# Patient Record
Sex: MALE | Race: WHITE | Employment: OTHER | URBAN - METROPOLITAN AREA
[De-identification: names, ages, dates, MRNs, and addresses within clinical notes are randomized per-mention and may not be internally consistent; named-entity substitution may affect disease eponyms.]

---

## 2017-01-04 ENCOUNTER — HOSPITAL ENCOUNTER (OUTPATIENT)
Dept: OTHER | Age: 62
Discharge: HOME | End: 2017-01-04
Attending: PSYCHIATRY & NEUROLOGY | Admitting: PSYCHIATRY & NEUROLOGY

## 2017-01-04 LAB
BASOPHILS ABSOLUTE: 0 K/UL (ref 0–0.2)
BASOPHILS RELATIVE PERCENT: 0.4 %
EOSINOPHILS ABSOLUTE: 0.2 K/UL (ref 0–0.6)
EOSINOPHILS RELATIVE PERCENT: 2.6 %
HCT VFR BLD CALC: 39 % (ref 40.5–52.5)
HEMOGLOBIN: 13 G/DL (ref 13.5–17.5)
LYMPHOCYTES ABSOLUTE: 0.3 K/UL (ref 1–5.1)
LYMPHOCYTES RELATIVE PERCENT: 4.7 %
MCH RBC QN AUTO: 30 PG (ref 26–34)
MCHC RBC AUTO-ENTMCNC: 33.4 G/DL (ref 31–36)
MCV RBC AUTO: 89.8 FL (ref 80–100)
MONOCYTES ABSOLUTE: 0.6 K/UL (ref 0–1.3)
MONOCYTES RELATIVE PERCENT: 10.4 %
NEUTROPHILS ABSOLUTE: 5 K/UL (ref 1.7–7.7)
NEUTROPHILS RELATIVE PERCENT: 81.9 %
PDW BLD-RTO: 13.9 % (ref 12.4–15.4)
PLATELET # BLD: 225 K/UL (ref 135–450)
PMV BLD AUTO: 8.5 FL (ref 5–10.5)
RBC # BLD: 4.34 M/UL (ref 4.2–5.9)
TOTAL CK: 51 U/L (ref 39–308)
WBC # BLD: 6.1 K/UL (ref 4–11)

## 2017-01-06 LAB
ACETYLCHOLINE BINDING ANTIBODY: 0 NMOL/L (ref 0–0.4)
ACETYLCHOLINE BLOCKING AB: 0 % (ref 0–26)

## 2017-01-09 LAB — MISCELLANEOUS LAB TEST ORDER: NORMAL

## 2017-01-31 ENCOUNTER — TELEPHONE (OUTPATIENT)
Dept: FAMILY MEDICINE CLINIC | Age: 62
End: 2017-01-31

## 2017-02-01 DIAGNOSIS — R49.8 WEAKNESS OF VOICE: Primary | ICD-10-CM

## 2017-02-07 ENCOUNTER — TELEPHONE (OUTPATIENT)
Dept: FAMILY MEDICINE CLINIC | Age: 62
End: 2017-02-07

## 2017-02-09 RX ORDER — CARVEDILOL 3.12 MG/1
3.12 TABLET ORAL 2 TIMES DAILY WITH MEALS
Qty: 60 TABLET | Refills: 5 | Status: SHIPPED | OUTPATIENT
Start: 2017-02-09 | End: 2017-06-26 | Stop reason: SDUPTHER

## 2017-02-16 ENCOUNTER — TELEPHONE (OUTPATIENT)
Dept: FAMILY MEDICINE CLINIC | Age: 62
End: 2017-02-16

## 2017-02-27 ENCOUNTER — OFFICE VISIT (OUTPATIENT)
Dept: ENT CLINIC | Age: 62
End: 2017-02-27

## 2017-02-27 VITALS
WEIGHT: 220 LBS | BODY MASS INDEX: 34.53 KG/M2 | HEIGHT: 67 IN | DIASTOLIC BLOOD PRESSURE: 68 MMHG | HEART RATE: 71 BPM | SYSTOLIC BLOOD PRESSURE: 109 MMHG

## 2017-02-27 DIAGNOSIS — R13.14 PHARYNGOESOPHAGEAL DYSPHAGIA: ICD-10-CM

## 2017-02-27 DIAGNOSIS — H90.8 HEARING LOSS, MIXED CONDUCTIVE AND SENSORINEURAL: ICD-10-CM

## 2017-02-27 DIAGNOSIS — T17.308A CHOKING, INITIAL ENCOUNTER: Primary | ICD-10-CM

## 2017-02-27 DIAGNOSIS — H61.23 BILATERAL IMPACTED CERUMEN: ICD-10-CM

## 2017-02-27 DIAGNOSIS — K21.9 LARYNGOPHARYNGEAL REFLUX (LPR): ICD-10-CM

## 2017-02-27 PROCEDURE — 69210 REMOVE IMPACTED EAR WAX UNI: CPT | Performed by: OTOLARYNGOLOGY

## 2017-02-27 PROCEDURE — 31575 DIAGNOSTIC LARYNGOSCOPY: CPT | Performed by: OTOLARYNGOLOGY

## 2017-02-27 RX ORDER — OMEPRAZOLE 40 MG/1
40 CAPSULE, DELAYED RELEASE ORAL DAILY
Qty: 30 CAPSULE | Refills: 3 | Status: SHIPPED | OUTPATIENT
Start: 2017-02-27 | End: 2017-08-03 | Stop reason: ALTCHOICE

## 2017-03-16 ENCOUNTER — TELEPHONE (OUTPATIENT)
Dept: FAMILY MEDICINE CLINIC | Age: 62
End: 2017-03-16

## 2017-03-16 RX ORDER — AZITHROMYCIN 250 MG/1
TABLET, FILM COATED ORAL
Qty: 1 PACKET | Refills: 0 | Status: SHIPPED | OUTPATIENT
Start: 2017-03-16 | End: 2017-03-16 | Stop reason: SDUPTHER

## 2017-03-16 RX ORDER — AZITHROMYCIN 250 MG/1
TABLET, FILM COATED ORAL
Qty: 1 PACKET | Refills: 0 | Status: SHIPPED | OUTPATIENT
Start: 2017-03-16 | End: 2017-03-26

## 2017-03-28 ENCOUNTER — TELEPHONE (OUTPATIENT)
Dept: CARDIOLOGY CLINIC | Age: 62
End: 2017-03-28

## 2017-05-03 ENCOUNTER — TELEPHONE (OUTPATIENT)
Dept: CARDIOLOGY CLINIC | Age: 62
End: 2017-05-03

## 2017-05-26 ENCOUNTER — TELEPHONE (OUTPATIENT)
Dept: CARDIOLOGY CLINIC | Age: 62
End: 2017-05-26

## 2017-06-09 ENCOUNTER — TELEPHONE (OUTPATIENT)
Dept: FAMILY MEDICINE CLINIC | Age: 62
End: 2017-06-09

## 2017-06-09 DIAGNOSIS — M54.40 CHRONIC BILATERAL LOW BACK PAIN WITH SCIATICA, SCIATICA LATERALITY UNSPECIFIED: ICD-10-CM

## 2017-06-09 DIAGNOSIS — M51.16 LUMBAR DISC DISEASE WITH RADICULOPATHY: ICD-10-CM

## 2017-06-09 DIAGNOSIS — G89.29 CHRONIC BILATERAL LOW BACK PAIN WITH SCIATICA, SCIATICA LATERALITY UNSPECIFIED: ICD-10-CM

## 2017-06-09 DIAGNOSIS — F32.4 MAJOR DEPRESSION SINGLE EPISODE, IN PARTIAL REMISSION (HCC): ICD-10-CM

## 2017-06-09 RX ORDER — HYDROCODONE BITARTRATE AND ACETAMINOPHEN 5; 325 MG/1; MG/1
1 TABLET ORAL EVERY 6 HOURS PRN
Qty: 120 TABLET | Refills: 0 | Status: SHIPPED | OUTPATIENT
Start: 2017-06-09 | End: 2017-09-21 | Stop reason: SDUPTHER

## 2017-06-09 RX ORDER — VENLAFAXINE HYDROCHLORIDE 150 MG/1
150 CAPSULE, EXTENDED RELEASE ORAL DAILY
Qty: 90 CAPSULE | Refills: 1 | Status: SHIPPED | OUTPATIENT
Start: 2017-06-09 | End: 2018-01-16 | Stop reason: SDUPTHER

## 2017-06-21 ENCOUNTER — OFFICE VISIT (OUTPATIENT)
Dept: CARDIOLOGY CLINIC | Age: 62
End: 2017-06-21

## 2017-06-21 VITALS
BODY MASS INDEX: 34.42 KG/M2 | HEART RATE: 71 BPM | SYSTOLIC BLOOD PRESSURE: 110 MMHG | OXYGEN SATURATION: 98 % | WEIGHT: 214.2 LBS | DIASTOLIC BLOOD PRESSURE: 70 MMHG | HEIGHT: 66 IN

## 2017-06-21 DIAGNOSIS — I25.10 CORONARY ARTERY DISEASE INVOLVING NATIVE CORONARY ARTERY OF NATIVE HEART WITHOUT ANGINA PECTORIS: Primary | ICD-10-CM

## 2017-06-21 DIAGNOSIS — Z95.810 ICD (IMPLANTABLE CARDIOVERTER-DEFIBRILLATOR), BIVENTRICULAR, IN SITU: ICD-10-CM

## 2017-06-21 DIAGNOSIS — I50.22 CHRONIC SYSTOLIC CONGESTIVE HEART FAILURE (HCC): ICD-10-CM

## 2017-06-21 DIAGNOSIS — I44.7 LEFT BUNDLE BRANCH BLOCK: ICD-10-CM

## 2017-06-21 DIAGNOSIS — I50.20 SYSTOLIC CONGESTIVE HEART FAILURE, NYHA CLASS 3 (HCC): ICD-10-CM

## 2017-06-21 PROCEDURE — 93000 ELECTROCARDIOGRAM COMPLETE: CPT | Performed by: INTERNAL MEDICINE

## 2017-06-21 PROCEDURE — 99214 OFFICE O/P EST MOD 30 MIN: CPT | Performed by: INTERNAL MEDICINE

## 2017-06-22 ENCOUNTER — TELEPHONE (OUTPATIENT)
Dept: CARDIOLOGY CLINIC | Age: 62
End: 2017-06-22

## 2017-06-22 DIAGNOSIS — I51.9 LV DYSFUNCTION: ICD-10-CM

## 2017-06-22 DIAGNOSIS — I50.30 CHF WITH LEFT VENTRICULAR DIASTOLIC DYSFUNCTION, NYHA CLASS 2 (HCC): Primary | ICD-10-CM

## 2017-06-26 RX ORDER — LISINOPRIL 2.5 MG/1
TABLET ORAL
Qty: 90 TABLET | Refills: 0 | Status: SHIPPED | OUTPATIENT
Start: 2017-06-26 | End: 2017-07-06 | Stop reason: ALTCHOICE

## 2017-06-27 ENCOUNTER — TELEPHONE (OUTPATIENT)
Dept: FAMILY MEDICINE CLINIC | Age: 62
End: 2017-06-27

## 2017-06-27 RX ORDER — CARVEDILOL 3.12 MG/1
TABLET ORAL
Qty: 180 TABLET | Refills: 3 | Status: SHIPPED | OUTPATIENT
Start: 2017-06-27 | End: 2018-11-29 | Stop reason: SDUPTHER

## 2017-06-30 ENCOUNTER — HOSPITAL ENCOUNTER (OUTPATIENT)
Dept: NON INVASIVE DIAGNOSTICS | Age: 62
Discharge: HOME | End: 2017-06-30
Attending: INTERNAL MEDICINE | Admitting: INTERNAL MEDICINE

## 2017-06-30 DIAGNOSIS — I50.30 DIASTOLIC CONGESTIVE HEART FAILURE (HCC): ICD-10-CM

## 2017-06-30 LAB
LV EF: 23 %
LVEF MODALITY: NORMAL

## 2017-07-06 ENCOUNTER — TELEPHONE (OUTPATIENT)
Dept: CARDIOLOGY CLINIC | Age: 62
End: 2017-07-06

## 2017-07-13 ENCOUNTER — TELEPHONE (OUTPATIENT)
Dept: FAMILY MEDICINE CLINIC | Age: 62
End: 2017-07-13

## 2017-07-18 ENCOUNTER — NURSE ONLY (OUTPATIENT)
Dept: CARDIOLOGY CLINIC | Age: 62
End: 2017-07-18

## 2017-07-18 DIAGNOSIS — Z95.810 ICD (IMPLANTABLE CARDIOVERTER-DEFIBRILLATOR), BIVENTRICULAR, IN SITU: Primary | ICD-10-CM

## 2017-07-18 DIAGNOSIS — I42.9 CARDIOMYOPATHY (HCC): ICD-10-CM

## 2017-07-18 DIAGNOSIS — I50.30 CHF WITH LEFT VENTRICULAR DIASTOLIC DYSFUNCTION, NYHA CLASS 2 (HCC): ICD-10-CM

## 2017-07-18 PROCEDURE — 93296 REM INTERROG EVL PM/IDS: CPT | Performed by: INTERNAL MEDICINE

## 2017-07-18 PROCEDURE — 93297 REM INTERROG DEV EVAL ICPMS: CPT | Performed by: INTERNAL MEDICINE

## 2017-07-18 PROCEDURE — 93295 DEV INTERROG REMOTE 1/2/MLT: CPT | Performed by: INTERNAL MEDICINE

## 2017-07-19 ENCOUNTER — TELEPHONE (OUTPATIENT)
Dept: CARDIOLOGY CLINIC | Age: 62
End: 2017-07-19

## 2017-08-03 ENCOUNTER — TELEPHONE (OUTPATIENT)
Dept: CARDIOLOGY CLINIC | Age: 62
End: 2017-08-03

## 2017-08-03 ENCOUNTER — HOSPITAL ENCOUNTER (OUTPATIENT)
Dept: CARDIAC REHAB | Age: 62
Discharge: HOME | End: 2017-08-31
Attending: INTERNAL MEDICINE | Admitting: INTERNAL MEDICINE

## 2017-08-07 ENCOUNTER — OFFICE VISIT (OUTPATIENT)
Dept: FAMILY MEDICINE CLINIC | Age: 62
End: 2017-08-07

## 2017-08-07 VITALS
HEIGHT: 67 IN | BODY MASS INDEX: 34.37 KG/M2 | DIASTOLIC BLOOD PRESSURE: 80 MMHG | SYSTOLIC BLOOD PRESSURE: 124 MMHG | WEIGHT: 219 LBS

## 2017-08-07 DIAGNOSIS — F32.4 MAJOR DEPRESSION SINGLE EPISODE, IN PARTIAL REMISSION (HCC): ICD-10-CM

## 2017-08-07 DIAGNOSIS — Z00.00 PREVENTATIVE HEALTH CARE: ICD-10-CM

## 2017-08-07 DIAGNOSIS — G89.29 CHRONIC MIDLINE LOW BACK PAIN WITHOUT SCIATICA: ICD-10-CM

## 2017-08-07 DIAGNOSIS — Z11.59 ENCOUNTER FOR HEPATITIS C SCREENING TEST FOR LOW RISK PATIENT: ICD-10-CM

## 2017-08-07 DIAGNOSIS — G35 MS (MULTIPLE SCLEROSIS) (HCC): ICD-10-CM

## 2017-08-07 DIAGNOSIS — M75.101 ROTATOR CUFF SYNDROME, RIGHT: ICD-10-CM

## 2017-08-07 DIAGNOSIS — E78.00 HYPERCHOLESTEROLEMIA: ICD-10-CM

## 2017-08-07 DIAGNOSIS — M54.50 CHRONIC MIDLINE LOW BACK PAIN WITHOUT SCIATICA: ICD-10-CM

## 2017-08-07 DIAGNOSIS — I10 ESSENTIAL HYPERTENSION: Primary | ICD-10-CM

## 2017-08-07 DIAGNOSIS — I50.30 CHF WITH LEFT VENTRICULAR DIASTOLIC DYSFUNCTION, NYHA CLASS 2 (HCC): ICD-10-CM

## 2017-08-07 LAB
ALT SERPL-CCNC: 14 U/L (ref 10–40)
ANION GAP SERPL CALCULATED.3IONS-SCNC: 17 MMOL/L (ref 3–16)
AST SERPL-CCNC: 13 U/L (ref 15–37)
BUN BLDV-MCNC: 17 MG/DL (ref 7–20)
CALCIUM SERPL-MCNC: 9.1 MG/DL (ref 8.3–10.6)
CHLORIDE BLD-SCNC: 102 MMOL/L (ref 99–110)
CHOLESTEROL, TOTAL: 108 MG/DL (ref 0–199)
CO2: 21 MMOL/L (ref 21–32)
CREAT SERPL-MCNC: 1.1 MG/DL (ref 0.8–1.3)
GFR AFRICAN AMERICAN: >60
GFR NON-AFRICAN AMERICAN: >60
GLUCOSE BLD-MCNC: 93 MG/DL (ref 70–99)
HDLC SERPL-MCNC: 35 MG/DL (ref 40–60)
LDL CHOLESTEROL CALCULATED: 50 MG/DL
POTASSIUM SERPL-SCNC: 4.3 MMOL/L (ref 3.5–5.1)
SODIUM BLD-SCNC: 140 MMOL/L (ref 136–145)
TRIGL SERPL-MCNC: 113 MG/DL (ref 0–150)
VLDLC SERPL CALC-MCNC: 23 MG/DL

## 2017-08-07 PROCEDURE — 99214 OFFICE O/P EST MOD 30 MIN: CPT | Performed by: FAMILY MEDICINE

## 2017-08-07 PROCEDURE — 36415 COLL VENOUS BLD VENIPUNCTURE: CPT | Performed by: FAMILY MEDICINE

## 2017-08-07 ASSESSMENT — ENCOUNTER SYMPTOMS: SHORTNESS OF BREATH: 0

## 2017-08-08 LAB — HEPATITIS C ANTIBODY INTERPRETATION: NORMAL

## 2017-08-09 ENCOUNTER — TELEPHONE (OUTPATIENT)
Dept: CARDIOLOGY CLINIC | Age: 62
End: 2017-08-09

## 2017-08-09 ENCOUNTER — HOSPITAL ENCOUNTER (OUTPATIENT)
Dept: MRI IMAGING | Age: 62
Discharge: HOME | End: 2017-08-09
Attending: PSYCHIATRY & NEUROLOGY | Admitting: PSYCHIATRY & NEUROLOGY

## 2017-08-09 DIAGNOSIS — G35 MULTIPLE SCLEROSIS (HCC): ICD-10-CM

## 2017-08-11 ENCOUNTER — TELEPHONE (OUTPATIENT)
Dept: CARDIOLOGY CLINIC | Age: 62
End: 2017-08-11

## 2017-09-06 ENCOUNTER — HOSPITAL ENCOUNTER (OUTPATIENT)
Dept: OTHER | Age: 62
Discharge: HOME | End: 2017-09-30
Attending: FAMILY MEDICINE | Admitting: FAMILY MEDICINE

## 2017-09-06 NOTE — FLOWSHEET NOTE
Physical Therapy Daily Treatment Note  Date:  2017    Patient Name:  Domenic Blake    :  1955  MRN: [id number]  Restrictions/Precautions:  Pacemaker, Multiple Sclerosis  Pertinent Medical History: CHF, CAD- Stent Placement , Back Pain, HTN, Stroke, Cardiomyopathy, Lumbar Disc Disease-Laminectomy , Depression  Medical/Treatment Diagnosis Information:  Diagnosis: Rotator Cuff Syndrome, Right  Treatment Diagnosis: Decreased R shoulder PROM, joint play, and strength seconday to RTC chronic tear. Insurance/Certification information:  PT Insurance Information: [address] [place] [address]  Physician Information:  Referring Practitioner: Dr. Jazmin Cantrell of care signed (Y/N):    Visit# / total visits:    Pain level: /10     G-Code (if applicable):      Date / Visit # G-Code Applied:  17  PT G-Codes  Functional Assessment Tool Used: Charisma Waters  Score: 15  Functional Limitation: Carrying, moving and handling objects  Carrying, Moving and Handling Objects Current Status (): At least 20 percent but less than 40 percent impaired, limited or restricted  Carrying, Moving and Handling Objects Goal Status (): At least 1 percent but less than 20 percent impaired, limited or restricted    Progress Note: []  Yes  []  No  Next due by: Visit #10      History of Injury:  Pt states in  he tore his R RTC. They attempted surgery in  but there was no tissue to repair it. He has been living with it since. Pt states in May, 2017 he noticed it started getting weak/tired, he can't raise it high. Pain is 6-8/10 sometimes,comes and goes. Pt is disabled and does not work. Has trouble getting dressed with shirts and jackets. No trouble sleeping. Plays golf. Goal is to have less pain and more movement.      Subjective:       Objective:  Observation:   Test measurements:      Exercises:  Exercise/Equipment Resistance/Repetitions Other comments                                           HEP      Table

## 2017-09-06 NOTE — PROGRESS NOTES
Quick DASH      Patient: Grecia Joy  : 1955  MRN: [id number]  Date: 2017  Electronically Signed by: Raeann Patel    Instructions:   · This Questionnaire asks about your symptoms as well as your ability to perform certain activities. · Please answer every question, based on your condition in the last week, by selecting the appropriate number. · If you did not have the opportunity to perform the activity in the past week, please make your best estimate of which response would be the most accurate. · It doesn't matter which hand or arm you use to perform the activity; please answer based on your ability regardless of how you perform the task. Please rate your ability to do the following activities in the last week by selecting the number below the appropriate response      No Difficulty Mild Difficulty Moderate Difficulty Severe Difficulty Unable   1. Open a tight or new jar    [] 1  [x] 2  [] 3  [] 4  [] 5   2. Do heavy household chores (e.g., wash walls, floors)  [] 1  [] 2  [] 3  [] 4  [] 5   3. Mague a shopping bag or briefcase  [] 1  [x] 2  [] 3  [] 4  [] 5   4. Wash your back    [] 1  [x] 2  [] 3  [] 4  [] 5   5. Use a knife to cut food    [x] 1  [] 2  [] 3  [] 4  [] 5   6. Recreational activities in which you take some force or impact through your arm, shoulder, or hand (e.g., golf, hammering, tennis, etc.)  [] 1  [x] 2   3  [] 4  [] 5      Not At All  Slightly Moderately Quite A Bit  Extremely   7. During the past week, to what extent has your arm, shoulder or hand problem interfered with your normal social activities with family, friends, neighbors or groups? [x] 1  [] 2  [] 3  [] 4  [] 5      Not Limited At All Slightly Limited  Moderately Limited Very Limited  Unable   8. During the past week, were you limited in your work or other regular daily activities as a result of your arm, shoulder or hand problem?   [x] 1  [] 2  [] 3  [] 4  [] 5     Please rate the severity of the

## 2017-09-08 ENCOUNTER — HOSPITAL ENCOUNTER (OUTPATIENT)
Dept: PHYSICAL THERAPY | Age: 62
Discharge: HOME | End: 2017-09-09
Admitting: FAMILY MEDICINE

## 2017-09-11 ENCOUNTER — TELEPHONE (OUTPATIENT)
Dept: CARDIOLOGY CLINIC | Age: 62
End: 2017-09-11

## 2017-09-11 RX ORDER — ATORVASTATIN CALCIUM 20 MG/1
TABLET, FILM COATED ORAL
Qty: 14 TABLET | Refills: 0 | Status: SHIPPED | OUTPATIENT
Start: 2017-09-11 | End: 2017-09-14 | Stop reason: SDUPTHER

## 2017-09-13 ENCOUNTER — HOSPITAL ENCOUNTER (OUTPATIENT)
Dept: MRI IMAGING | Age: 62
Discharge: HOME | End: 2017-09-13
Attending: PSYCHIATRY & NEUROLOGY | Admitting: PSYCHIATRY & NEUROLOGY

## 2017-09-13 DIAGNOSIS — Z51.81 ENCOUNTER FOR THERAPEUTIC DRUG MONITORING: ICD-10-CM

## 2017-09-13 DIAGNOSIS — G35 MULTIPLE SCLEROSIS (HCC): ICD-10-CM

## 2017-09-13 DIAGNOSIS — Z95.0 PACEMAKER: ICD-10-CM

## 2017-09-14 RX ORDER — ATORVASTATIN CALCIUM 20 MG/1
TABLET, FILM COATED ORAL
Qty: 90 TABLET | Refills: 3 | Status: SHIPPED | OUTPATIENT
Start: 2017-09-14 | End: 2018-10-11 | Stop reason: SDUPTHER

## 2017-09-18 ENCOUNTER — HOSPITAL ENCOUNTER (OUTPATIENT)
Dept: MRI IMAGING | Age: 62
Discharge: HOME | End: 2017-09-18
Attending: PSYCHIATRY & NEUROLOGY | Admitting: PSYCHIATRY & NEUROLOGY

## 2017-09-18 ENCOUNTER — HOSPITAL ENCOUNTER (OUTPATIENT)
Dept: PHYSICAL THERAPY | Age: 62
Discharge: HOME | End: 2017-09-19
Admitting: FAMILY MEDICINE

## 2017-09-18 DIAGNOSIS — G35 MULTIPLE SCLEROSIS (HCC): ICD-10-CM

## 2017-09-18 DIAGNOSIS — Z51.81 ENCOUNTER FOR THERAPEUTIC DRUG MONITORING: ICD-10-CM

## 2017-09-21 ENCOUNTER — TELEPHONE (OUTPATIENT)
Dept: FAMILY MEDICINE CLINIC | Age: 62
End: 2017-09-21

## 2017-09-21 DIAGNOSIS — M51.16 LUMBAR DISC DISEASE WITH RADICULOPATHY: ICD-10-CM

## 2017-09-21 DIAGNOSIS — G89.29 CHRONIC BILATERAL LOW BACK PAIN WITH SCIATICA, SCIATICA LATERALITY UNSPECIFIED: ICD-10-CM

## 2017-09-21 DIAGNOSIS — M54.40 CHRONIC BILATERAL LOW BACK PAIN WITH SCIATICA, SCIATICA LATERALITY UNSPECIFIED: ICD-10-CM

## 2017-09-21 RX ORDER — HYDROCODONE BITARTRATE AND ACETAMINOPHEN 5; 325 MG/1; MG/1
1 TABLET ORAL EVERY 6 HOURS PRN
Qty: 120 TABLET | Refills: 0 | Status: SHIPPED | OUTPATIENT
Start: 2017-09-21 | End: 2018-02-06 | Stop reason: SDUPTHER

## 2017-09-22 ENCOUNTER — HOSPITAL ENCOUNTER (OUTPATIENT)
Dept: PHYSICAL THERAPY | Age: 62
Discharge: HOME | End: 2017-09-23
Admitting: FAMILY MEDICINE

## 2017-09-27 ENCOUNTER — HOSPITAL ENCOUNTER (OUTPATIENT)
Dept: PHYSICAL THERAPY | Age: 62
Discharge: HOME | End: 2017-09-28
Admitting: FAMILY MEDICINE

## 2017-09-29 ENCOUNTER — HOSPITAL ENCOUNTER (OUTPATIENT)
Dept: PHYSICAL THERAPY | Age: 62
Discharge: HOME | End: 2017-09-30
Admitting: FAMILY MEDICINE

## 2017-10-01 ENCOUNTER — HOSPITAL ENCOUNTER (OUTPATIENT)
Dept: OTHER | Age: 62
Discharge: HOME | End: 2017-10-31
Attending: INTERNAL MEDICINE | Admitting: INTERNAL MEDICINE

## 2017-10-02 ENCOUNTER — HOSPITAL ENCOUNTER (OUTPATIENT)
Dept: PHYSICAL THERAPY | Age: 62
Discharge: HOME | End: 2017-10-03
Admitting: FAMILY MEDICINE

## 2017-10-04 ENCOUNTER — HOSPITAL ENCOUNTER (OUTPATIENT)
Dept: PHYSICAL THERAPY | Age: 62
Discharge: HOME | End: 2017-10-05
Admitting: FAMILY MEDICINE

## 2017-10-09 ENCOUNTER — HOSPITAL ENCOUNTER (OUTPATIENT)
Dept: PHYSICAL THERAPY | Age: 62
Discharge: HOME | End: 2017-10-10
Admitting: FAMILY MEDICINE

## 2017-10-11 ENCOUNTER — HOSPITAL ENCOUNTER (OUTPATIENT)
Dept: PHYSICAL THERAPY | Age: 62
Discharge: HOME | End: 2017-10-12
Admitting: FAMILY MEDICINE

## 2017-10-16 ENCOUNTER — HOSPITAL ENCOUNTER (OUTPATIENT)
Dept: PHYSICAL THERAPY | Age: 62
Discharge: HOME | End: 2017-10-17
Admitting: FAMILY MEDICINE

## 2017-10-17 ENCOUNTER — NURSE ONLY (OUTPATIENT)
Dept: CARDIOLOGY CLINIC | Age: 62
End: 2017-10-17

## 2017-10-17 DIAGNOSIS — Z95.810 ICD (IMPLANTABLE CARDIOVERTER-DEFIBRILLATOR), BIVENTRICULAR, IN SITU: Primary | ICD-10-CM

## 2017-10-17 DIAGNOSIS — I42.9 CARDIOMYOPATHY, UNSPECIFIED TYPE (HCC): ICD-10-CM

## 2017-10-17 DIAGNOSIS — I50.30 CHF WITH LEFT VENTRICULAR DIASTOLIC DYSFUNCTION, NYHA CLASS 2 (HCC): ICD-10-CM

## 2017-10-17 DIAGNOSIS — I50.22 HEART FAILURE, CHRONIC SYSTOLIC (HCC): ICD-10-CM

## 2017-10-17 PROCEDURE — 93297 REM INTERROG DEV EVAL ICPMS: CPT | Performed by: INTERNAL MEDICINE

## 2017-10-17 PROCEDURE — 93296 REM INTERROG EVL PM/IDS: CPT | Performed by: INTERNAL MEDICINE

## 2017-10-17 PROCEDURE — 93295 DEV INTERROG REMOTE 1/2/MLT: CPT | Performed by: INTERNAL MEDICINE

## 2017-10-18 ENCOUNTER — HOSPITAL ENCOUNTER (OUTPATIENT)
Dept: PHYSICAL THERAPY | Age: 62
Discharge: HOME | End: 2017-10-19
Admitting: FAMILY MEDICINE

## 2017-10-20 NOTE — PROGRESS NOTES
See PACEART report under Cardiology tab. Carelink transmission shows normal functioning device. No new arrhythmias recorded. Thoracic impedance trend stable. Follow up in 3 months via carelink.

## 2017-11-01 ENCOUNTER — HOSPITAL ENCOUNTER (OUTPATIENT)
Dept: OTHER | Age: 62
Discharge: HOME | End: 2017-11-30
Attending: INTERNAL MEDICINE | Admitting: INTERNAL MEDICINE

## 2017-11-01 ENCOUNTER — HOSPITAL ENCOUNTER (OUTPATIENT)
Dept: OTHER | Age: 62
Discharge: HOME | End: 2017-11-30
Attending: FAMILY MEDICINE | Admitting: FAMILY MEDICINE

## 2017-12-01 ENCOUNTER — HOSPITAL ENCOUNTER (OUTPATIENT)
Dept: OTHER | Age: 62
Discharge: HOME | End: 2017-12-31
Attending: INTERNAL MEDICINE | Admitting: INTERNAL MEDICINE

## 2017-12-18 ENCOUNTER — OFFICE VISIT (OUTPATIENT)
Dept: CARDIOLOGY CLINIC | Age: 62
End: 2017-12-18

## 2017-12-18 VITALS
SYSTOLIC BLOOD PRESSURE: 126 MMHG | HEART RATE: 88 BPM | HEIGHT: 67 IN | WEIGHT: 220.38 LBS | OXYGEN SATURATION: 98 % | DIASTOLIC BLOOD PRESSURE: 62 MMHG | BODY MASS INDEX: 34.59 KG/M2

## 2017-12-18 DIAGNOSIS — I50.22 CHRONIC SYSTOLIC CHF (CONGESTIVE HEART FAILURE), NYHA CLASS 3 (HCC): ICD-10-CM

## 2017-12-18 DIAGNOSIS — I44.7 LBBB (LEFT BUNDLE BRANCH BLOCK): ICD-10-CM

## 2017-12-18 DIAGNOSIS — E78.5 HYPERLIPIDEMIA LDL GOAL <70: ICD-10-CM

## 2017-12-18 DIAGNOSIS — I25.10 CORONARY ARTERY DISEASE INVOLVING NATIVE CORONARY ARTERY OF NATIVE HEART WITHOUT ANGINA PECTORIS: Primary | ICD-10-CM

## 2017-12-18 PROCEDURE — G8417 CALC BMI ABV UP PARAM F/U: HCPCS | Performed by: INTERNAL MEDICINE

## 2017-12-18 PROCEDURE — G8427 DOCREV CUR MEDS BY ELIG CLIN: HCPCS | Performed by: INTERNAL MEDICINE

## 2017-12-18 PROCEDURE — 3017F COLORECTAL CA SCREEN DOC REV: CPT | Performed by: INTERNAL MEDICINE

## 2017-12-18 PROCEDURE — 99214 OFFICE O/P EST MOD 30 MIN: CPT | Performed by: INTERNAL MEDICINE

## 2017-12-18 PROCEDURE — G8484 FLU IMMUNIZE NO ADMIN: HCPCS | Performed by: INTERNAL MEDICINE

## 2017-12-18 PROCEDURE — 1036F TOBACCO NON-USER: CPT | Performed by: INTERNAL MEDICINE

## 2017-12-18 PROCEDURE — G8598 ASA/ANTIPLAT THER USED: HCPCS | Performed by: INTERNAL MEDICINE

## 2017-12-18 NOTE — PROGRESS NOTES
severely reduced. Ejection   fraction is visually estimated to be 25-30 %. There is severe diffuse   hypokinesis with regional variation including akinesis of the inferior wall.   Mild-to-moderately dilated left ventricle. Diastolic filling parameters   suggests grade II diastolic dysfunction.   Normal right ventricular size and function.   The left atrium appears mildly dilated.   Trivial mitral regurgitation. Echo 6/30/17: Moderately dilated LV with EF 20-25%; Distal septal wall thinning with   akinesis. Postero lateral and inferior akinesis as well.   Mild mitral regurgitation is present.  Mily Keys dilated left atrium.   Trace aortic regurgitation is present.   The RV is mildly enlarged with mildly reduced EF. Pacer / ICD wire is   visualized in it.          ECG 06/2015: Sinus bradycardia with LBBB  ECG 8/24/16: Sinus bradycardia with frequent PVC's, LBBB      Assessment:  1. CAD of native coronary arteries without angina, s/p PCI  2. Chronic Systolic CHF, class 3, s/p BiV AICD  3. Hyperlipidemia with goal LDL<70mg/dL  4. Left Bundle Branch Block    Plan:  As he is currently tolerating and his blood pressure allows, I will increase his dose of Entresto to the full tablet of 24-26 twice daily. I have encouraged him to increase his weekly exercise as this will help aid in his fatigue. I have also encouraged him to monitor daily weights and call the office with weight gain or an increase in swelling. I will see him back in office for follow up in 6 months.

## 2018-01-04 ENCOUNTER — TELEPHONE (OUTPATIENT)
Dept: CARDIOLOGY CLINIC | Age: 63
End: 2018-01-04

## 2018-01-16 ENCOUNTER — NURSE ONLY (OUTPATIENT)
Dept: CARDIOLOGY CLINIC | Age: 63
End: 2018-01-16

## 2018-01-16 ENCOUNTER — TELEPHONE (OUTPATIENT)
Dept: CARDIOLOGY CLINIC | Age: 63
End: 2018-01-16

## 2018-01-16 DIAGNOSIS — Z95.810 ICD (IMPLANTABLE CARDIOVERTER-DEFIBRILLATOR), BIVENTRICULAR, IN SITU: ICD-10-CM

## 2018-01-16 DIAGNOSIS — I25.5 ISCHEMIC CARDIOMYOPATHY: Chronic | ICD-10-CM

## 2018-01-16 DIAGNOSIS — I50.22 HEART FAILURE, CHRONIC SYSTOLIC (HCC): ICD-10-CM

## 2018-01-16 PROCEDURE — 93296 REM INTERROG EVL PM/IDS: CPT | Performed by: INTERNAL MEDICINE

## 2018-01-16 PROCEDURE — 93297 REM INTERROG DEV EVAL ICPMS: CPT | Performed by: INTERNAL MEDICINE

## 2018-01-16 PROCEDURE — 93295 DEV INTERROG REMOTE 1/2/MLT: CPT | Performed by: INTERNAL MEDICINE

## 2018-01-16 NOTE — PROGRESS NOTES
Carelink transmission shows normal functioning device. No new episodes recorded. TI is elevated.   (Message sent to Dr. Alexandra Guillen staff to address.)

## 2018-01-16 NOTE — TELEPHONE ENCOUNTER
Pt had a remote transmission today which showed that his Optivol is elevated indicating a possibility of fluid retention. Please call pt to discuss. Thank you.

## 2018-01-17 NOTE — TELEPHONE ENCOUNTER
Spoke with wife and she states that Scotty Hartman is doing really well. No SOB, No weight gain, no edema. She is very pleased with how well  he is doing.

## 2018-01-18 ENCOUNTER — TELEPHONE (OUTPATIENT)
Dept: CARDIOLOGY CLINIC | Age: 63
End: 2018-01-18

## 2018-01-18 NOTE — TELEPHONE ENCOUNTER
Went over med list with Marques Kessler and informed her that we do not have Lisinopril on his list.  She thought that was the one he was not supposed to take. I will close note for now.

## 2018-01-18 NOTE — TELEPHONE ENCOUNTER
Pérez Herman     Please call the patient to let the know Dr. Kelly Briscoe is aware of how he is feeling and does not need to make any changes at this time.

## 2018-02-06 ENCOUNTER — OFFICE VISIT (OUTPATIENT)
Dept: FAMILY MEDICINE CLINIC | Age: 63
End: 2018-02-06

## 2018-02-06 VITALS
WEIGHT: 228 LBS | BODY MASS INDEX: 35.79 KG/M2 | DIASTOLIC BLOOD PRESSURE: 72 MMHG | SYSTOLIC BLOOD PRESSURE: 120 MMHG | HEIGHT: 67 IN

## 2018-02-06 DIAGNOSIS — M54.50 CHRONIC MIDLINE LOW BACK PAIN WITHOUT SCIATICA: ICD-10-CM

## 2018-02-06 DIAGNOSIS — M25.511 CHRONIC RIGHT SHOULDER PAIN: ICD-10-CM

## 2018-02-06 DIAGNOSIS — M54.40 CHRONIC BILATERAL LOW BACK PAIN WITH SCIATICA, SCIATICA LATERALITY UNSPECIFIED: ICD-10-CM

## 2018-02-06 DIAGNOSIS — E78.00 HYPERCHOLESTEROLEMIA: ICD-10-CM

## 2018-02-06 DIAGNOSIS — G89.29 CHRONIC RIGHT SHOULDER PAIN: ICD-10-CM

## 2018-02-06 DIAGNOSIS — I50.30 CHF WITH LEFT VENTRICULAR DIASTOLIC DYSFUNCTION, NYHA CLASS 2 (HCC): ICD-10-CM

## 2018-02-06 DIAGNOSIS — M51.16 LUMBAR DISC DISEASE WITH RADICULOPATHY: ICD-10-CM

## 2018-02-06 DIAGNOSIS — I10 ESSENTIAL HYPERTENSION: Primary | ICD-10-CM

## 2018-02-06 DIAGNOSIS — F32.4 MAJOR DEPRESSION SINGLE EPISODE, IN PARTIAL REMISSION (HCC): ICD-10-CM

## 2018-02-06 DIAGNOSIS — G89.29 CHRONIC MIDLINE LOW BACK PAIN WITHOUT SCIATICA: ICD-10-CM

## 2018-02-06 DIAGNOSIS — G35 MS (MULTIPLE SCLEROSIS) (HCC): ICD-10-CM

## 2018-02-06 DIAGNOSIS — G89.29 CHRONIC BILATERAL LOW BACK PAIN WITH SCIATICA, SCIATICA LATERALITY UNSPECIFIED: ICD-10-CM

## 2018-02-06 PROCEDURE — 1036F TOBACCO NON-USER: CPT | Performed by: FAMILY MEDICINE

## 2018-02-06 PROCEDURE — 3017F COLORECTAL CA SCREEN DOC REV: CPT | Performed by: FAMILY MEDICINE

## 2018-02-06 PROCEDURE — G8484 FLU IMMUNIZE NO ADMIN: HCPCS | Performed by: FAMILY MEDICINE

## 2018-02-06 PROCEDURE — G8417 CALC BMI ABV UP PARAM F/U: HCPCS | Performed by: FAMILY MEDICINE

## 2018-02-06 PROCEDURE — 99213 OFFICE O/P EST LOW 20 MIN: CPT | Performed by: FAMILY MEDICINE

## 2018-02-06 PROCEDURE — G8427 DOCREV CUR MEDS BY ELIG CLIN: HCPCS | Performed by: FAMILY MEDICINE

## 2018-02-06 PROCEDURE — G8598 ASA/ANTIPLAT THER USED: HCPCS | Performed by: FAMILY MEDICINE

## 2018-02-06 RX ORDER — HYDROCODONE BITARTRATE AND ACETAMINOPHEN 5; 325 MG/1; MG/1
1 TABLET ORAL EVERY 6 HOURS PRN
Qty: 360 TABLET | Refills: 0 | Status: SHIPPED | OUTPATIENT
Start: 2018-02-06 | End: 2018-02-09 | Stop reason: SDUPTHER

## 2018-02-06 RX ORDER — DICLOFENAC SODIUM 75 MG/1
75 TABLET, DELAYED RELEASE ORAL 2 TIMES DAILY
Qty: 60 TABLET | Refills: 0 | Status: SHIPPED | OUTPATIENT
Start: 2018-02-06 | End: 2018-02-26 | Stop reason: SDUPTHER

## 2018-02-06 ASSESSMENT — ENCOUNTER SYMPTOMS: SHORTNESS OF BREATH: 0

## 2018-02-06 NOTE — PROGRESS NOTES
kg/m²    Objective:   Physical Exam   Constitutional: He is oriented to person, place, and time. He appears well-developed and well-nourished. No distress. HENT:   Head: Normocephalic. Right Ear: External ear normal.   Left Ear: External ear normal.   Mouth/Throat: Oropharynx is clear and moist. No oropharyngeal exudate. Neck: No JVD present. No thyromegaly present. Cardiovascular: Normal rate, regular rhythm, normal heart sounds and intact distal pulses. No murmur heard. Pulmonary/Chest: Effort normal and breath sounds normal. He has no wheezes. He has no rales. Musculoskeletal: He exhibits no edema. Patient is unable to abduct the right arm to 90 degrees and has trouble flexing past 90 degrees. No pain to palpation and no crepitus. Lymphadenopathy:     He has no cervical adenopathy. Neurological: He is alert and oriented to person, place, and time. Assessment:      Hypertension  Hyperlipidemia  Depression  Chronic Low Back Pain  Congestive Heart Failure  Multiple Sclerosis  Right Shoulder Pain      Plan:      OARRS report was reviewed  Medications refilled  Diclofenac 75 mg BID for 30 days. Physical Therapy for Right Shoulder  RTO 6 months for Hypertension / Depression      Controlled Substances Monitoring:     Attestation: The Prescription Monitoring Report for this patient was reviewed today. LEXIS Guidry  Documentation: No signs of potential drug abuse or diversion identified. LEXIS Guidry  Chronic Pain: Functional status reviewed - continues with improved or maintaining ADL's. LEXIS Guidry  Medication Contracts: Existing medication contract.  Mario Guido DO)

## 2018-02-09 ENCOUNTER — TELEPHONE (OUTPATIENT)
Dept: FAMILY MEDICINE CLINIC | Age: 63
End: 2018-02-09

## 2018-02-09 DIAGNOSIS — G89.29 CHRONIC BILATERAL LOW BACK PAIN WITH SCIATICA, SCIATICA LATERALITY UNSPECIFIED: ICD-10-CM

## 2018-02-09 DIAGNOSIS — M51.16 LUMBAR DISC DISEASE WITH RADICULOPATHY: ICD-10-CM

## 2018-02-09 DIAGNOSIS — M54.40 CHRONIC BILATERAL LOW BACK PAIN WITH SCIATICA, SCIATICA LATERALITY UNSPECIFIED: ICD-10-CM

## 2018-02-09 RX ORDER — HYDROCODONE BITARTRATE AND ACETAMINOPHEN 5; 325 MG/1; MG/1
1 TABLET ORAL EVERY 6 HOURS PRN
Qty: 120 TABLET | Refills: 0 | Status: SHIPPED | OUTPATIENT
Start: 2018-02-09 | End: 2018-07-24 | Stop reason: SDUPTHER

## 2018-02-09 NOTE — TELEPHONE ENCOUNTER
[address] (Mail [address])  States they can only fill 30 days at a time per new guidelines  And will need a new script  Please advise

## 2018-02-26 ENCOUNTER — TELEPHONE (OUTPATIENT)
Dept: FAMILY MEDICINE CLINIC | Age: 63
End: 2018-02-26

## 2018-02-26 DIAGNOSIS — M25.511 CHRONIC RIGHT SHOULDER PAIN: ICD-10-CM

## 2018-02-26 DIAGNOSIS — G89.29 CHRONIC RIGHT SHOULDER PAIN: ICD-10-CM

## 2018-02-26 RX ORDER — DICLOFENAC SODIUM 75 MG/1
75 TABLET, DELAYED RELEASE ORAL 2 TIMES DAILY
Qty: 180 TABLET | Refills: 0 | Status: SHIPPED | OUTPATIENT
Start: 2018-02-26 | End: 2018-06-22 | Stop reason: SDUPTHER

## 2018-04-18 ENCOUNTER — HOSPITAL ENCOUNTER (OUTPATIENT)
Dept: MRI IMAGING | Age: 63
Discharge: HOME | End: 2018-04-18
Attending: PSYCHIATRY & NEUROLOGY | Admitting: PSYCHIATRY & NEUROLOGY

## 2018-04-18 DIAGNOSIS — G35 MULTIPLE SCLEROSIS (HCC): ICD-10-CM

## 2018-04-18 LAB
BASOPHILS ABSOLUTE: 0 K/UL (ref 0–0.2)
BASOPHILS RELATIVE PERCENT: 0.5 %
CREAT SERPL-MCNC: 1.1 MG/DL (ref 0.8–1.3)
EOSINOPHILS ABSOLUTE: 0.1 K/UL (ref 0–0.6)
EOSINOPHILS RELATIVE PERCENT: 1.9 %
GFR AFRICAN AMERICAN: >60
GFR NON-AFRICAN AMERICAN: >60
HCT VFR BLD CALC: 42.8 % (ref 40.5–52.5)
HEMOGLOBIN: 14.4 G/DL (ref 13.5–17.5)
LYMPHOCYTES ABSOLUTE: 0.5 K/UL (ref 1–5.1)
LYMPHOCYTES RELATIVE PERCENT: 9.1 %
MCH RBC QN AUTO: 30.4 PG (ref 26–34)
MCHC RBC AUTO-ENTMCNC: 33.7 G/DL (ref 31–36)
MCV RBC AUTO: 90.2 FL (ref 80–100)
MONOCYTES ABSOLUTE: 0.7 K/UL (ref 0–1.3)
MONOCYTES RELATIVE PERCENT: 11.8 %
NEUTROPHILS ABSOLUTE: 4.4 K/UL (ref 1.7–7.7)
NEUTROPHILS RELATIVE PERCENT: 76.7 %
PDW BLD-RTO: 13.9 % (ref 12.4–15.4)
PLATELET # BLD: 185 K/UL (ref 135–450)
PMV BLD AUTO: 8.7 FL (ref 5–10.5)
RBC # BLD: 4.75 M/UL (ref 4.2–5.9)
WBC # BLD: 5.7 K/UL (ref 4–11)

## 2018-04-18 RX ORDER — SODIUM CHLORIDE 0.9 % (FLUSH) 0.9 %
10 SYRINGE (ML) INJECTION ONCE
Status: COMPLETED | OUTPATIENT
Start: 2018-04-18 | End: 2018-04-18

## 2018-04-18 RX ADMIN — Medication 10 ML: at 14:00

## 2018-04-25 ENCOUNTER — TELEPHONE (OUTPATIENT)
Dept: CARDIOLOGY CLINIC | Age: 63
End: 2018-04-25

## 2018-05-01 ENCOUNTER — NURSE ONLY (OUTPATIENT)
Dept: CARDIOLOGY CLINIC | Age: 63
End: 2018-05-01

## 2018-05-01 DIAGNOSIS — I50.22 HEART FAILURE, CHRONIC SYSTOLIC (HCC): ICD-10-CM

## 2018-05-01 DIAGNOSIS — Z95.810 ICD (IMPLANTABLE CARDIOVERTER-DEFIBRILLATOR), BIVENTRICULAR, IN SITU: ICD-10-CM

## 2018-05-01 DIAGNOSIS — I25.5 ISCHEMIC CARDIOMYOPATHY: Chronic | ICD-10-CM

## 2018-05-01 PROCEDURE — 93296 REM INTERROG EVL PM/IDS: CPT | Performed by: INTERNAL MEDICINE

## 2018-05-01 PROCEDURE — 93295 DEV INTERROG REMOTE 1/2/MLT: CPT | Performed by: INTERNAL MEDICINE

## 2018-05-01 PROCEDURE — 93297 REM INTERROG DEV EVAL ICPMS: CPT | Performed by: INTERNAL MEDICINE

## 2018-05-04 ENCOUNTER — TELEPHONE (OUTPATIENT)
Dept: CARDIOLOGY CLINIC | Age: 63
End: 2018-05-04

## 2018-05-10 RX ORDER — BUPROPION HYDROCHLORIDE 150 MG/1
150 TABLET, EXTENDED RELEASE ORAL 2 TIMES DAILY
Qty: 180 TABLET | Refills: 1 | Status: SHIPPED | OUTPATIENT
Start: 2018-05-10 | End: 2018-11-26 | Stop reason: SDUPTHER

## 2018-06-22 DIAGNOSIS — M25.511 CHRONIC RIGHT SHOULDER PAIN: ICD-10-CM

## 2018-06-22 DIAGNOSIS — G89.29 CHRONIC RIGHT SHOULDER PAIN: ICD-10-CM

## 2018-06-23 RX ORDER — DICLOFENAC SODIUM 75 MG/1
TABLET, DELAYED RELEASE ORAL
Qty: 180 TABLET | Refills: 0 | Status: SHIPPED | OUTPATIENT
Start: 2018-06-23 | End: 2018-10-11 | Stop reason: SDUPTHER

## 2018-06-25 ENCOUNTER — HOSPITAL ENCOUNTER (OUTPATIENT)
Dept: SPEECH THERAPY | Age: 63
Discharge: HOME | End: 2018-06-25
Attending: PSYCHIATRY & NEUROLOGY | Admitting: PSYCHIATRY & NEUROLOGY

## 2018-06-25 LAB
BASOPHILS ABSOLUTE: 0 K/UL (ref 0–0.2)
BASOPHILS RELATIVE PERCENT: 0.4 %
EOSINOPHILS ABSOLUTE: 0.1 K/UL (ref 0–0.6)
EOSINOPHILS RELATIVE PERCENT: 1.5 %
HCT VFR BLD CALC: 45.3 % (ref 40.5–52.5)
HEMOGLOBIN: 15.3 G/DL (ref 13.5–17.5)
LYMPHOCYTES ABSOLUTE: 0.6 K/UL (ref 1–5.1)
LYMPHOCYTES RELATIVE PERCENT: 9 %
MCH RBC QN AUTO: 30.9 PG (ref 26–34)
MCHC RBC AUTO-ENTMCNC: 33.9 G/DL (ref 31–36)
MCV RBC AUTO: 91.3 FL (ref 80–100)
MONOCYTES ABSOLUTE: 0.5 K/UL (ref 0–1.3)
MONOCYTES RELATIVE PERCENT: 7.8 %
NEUTROPHILS ABSOLUTE: 5.4 K/UL (ref 1.7–7.7)
NEUTROPHILS RELATIVE PERCENT: 81.3 %
PDW BLD-RTO: 14.1 % (ref 12.4–15.4)
PLATELET # BLD: 184 K/UL (ref 135–450)
PMV BLD AUTO: 8.9 FL (ref 5–10.5)
RBC # BLD: 4.96 M/UL (ref 4.2–5.9)
WBC # BLD: 6.6 K/UL (ref 4–11)

## 2018-06-25 RX ORDER — SACUBITRIL AND VALSARTAN 24; 26 MG/1; MG/1
TABLET, FILM COATED ORAL
Qty: 90 TABLET | Refills: 3 | Status: SHIPPED | OUTPATIENT
Start: 2018-06-25 | End: 2018-07-09 | Stop reason: SDUPTHER

## 2018-07-01 ENCOUNTER — HOSPITAL ENCOUNTER (OUTPATIENT)
Dept: OTHER | Age: 63
Discharge: HOME | End: 2018-07-31
Attending: PSYCHIATRY & NEUROLOGY | Admitting: PSYCHIATRY & NEUROLOGY

## 2018-07-02 ENCOUNTER — HOSPITAL ENCOUNTER (OUTPATIENT)
Dept: SPEECH THERAPY | Age: 63
Discharge: HOME | End: 2018-07-02
Attending: PSYCHOLOGIST | Admitting: PSYCHOLOGIST

## 2018-07-02 DIAGNOSIS — R13.19 ESOPHAGEAL DYSPHAGIA: ICD-10-CM

## 2018-07-02 NOTE — PROGRESS NOTES
Speech Language Pathology    [facility] [address] [facility]  MODIFIED BARIUM SWALLOW EVALUATION    Patient's Name: Marcell Alston. O.B: 1955  Medical Diagnosis: HPF//DYSPHAGIA/PTWIFE/C  Treatment Diagnosis: Dysphagia  Ordering MD:  Dr. Jesus Saenz  Radiologist: Dr. Jazz Daley   Date of Onset: 6/25/18  Date of Evaluation: 7/2/2018  Type of Study: Modified Barium Swallowing Study (MBS)  Diet Prior to Study:  \"regular\" diet with thin liquids per Pt report; Per chart review, NECTAR thick liquids were recommended per results of SLP Clinical Swallow Evaluation of 6/25/18  Pain Level: Pt denies pain at this time    Impression:  Modified Barium Swallow evaluation completed on 7/2/2018. Results indicate moderate oropharyngeal dysphagia with SILENT laryngeal penetration during the swallow and SILENT aspiration after the swallow noted with thin liquids. Premature bolus loss to pharynx with deep pharyngeal pooling to lower pharynx prior to swallow initiation, and impaired reflexive UES opening for bolus transfer to esophagus contribute to SILENT penetration/aspiration during and after the swallow with thin liquids. Improved reflexive UES opening for bolus transfer with no overt signs of aspiration noted with all textures > thins. Impaired pharyngeal sensation also noted as textures increase, with pharyngeal pooling and long delays in swallow initiation noted with soft solid and solid textures. Although no overt aspiration was noted with any texture > thins, reduced pharyngeal sensation and delayed pharyngeal clearing were noted with all textures, increasing aspiration potential for penetration/aspiration after the swallow if precautions are not followed.     Aspiration/Penetration Risk:  High risk with thin liquids    Recommendations:    Diet Level: \"soft\" regular texture diet with NECTAR thick liquids/no straws/meds with puree    Referral: Outpatient Speech Therapy for Dysphagia

## 2018-07-09 NOTE — TELEPHONE ENCOUNTER
Medication Refill    When was your last appointment with cardiology?  (if 1year or longer, please schedule an appointment) 12/18/17    Medication needing refilled: Gurjit Fisher of the medication: 24-26    How are you taking this medication (QD, BID, TID, QID, PRN):    Patient want a 30 or 90 day supply called in: 30 day     Which Pharmacy are we sending the medication to: [place] [address] [phone]    Pharmacy Phone number:    Pharmacy Fax number:

## 2018-07-23 ENCOUNTER — TELEPHONE (OUTPATIENT)
Dept: FAMILY MEDICINE CLINIC | Age: 63
End: 2018-07-23

## 2018-07-23 ASSESSMENT — ENCOUNTER SYMPTOMS: SHORTNESS OF BREATH: 0

## 2018-07-23 NOTE — PROGRESS NOTES
Subjective:      Patient ID: Kenzie Carroll is a 70 y.o. male. Hypertension   This is a chronic problem. The current episode started more than 1 year ago. The problem is unchanged. The problem is controlled. Associated symptoms include palpitations. Pertinent negatives include no chest pain, peripheral edema or shortness of breath. Risk factors for coronary artery disease include male gender, obesity and sedentary lifestyle. Past treatments include angiotensin blockers and beta blockers. The current treatment provides significant improvement. There are no compliance problems. Hyperlipidemia:  Pt is tolerating and compliant with Lipitor 20 mg daily. He is fasting today for a lipid recheck. Depression: Pt is tolerating and compliant with Effexor  mg daily and Wellbutrin  mg BID. He feels that the medication is helping with his symptoms. He is sleeping fair and denies any suicidal ideation. He feels that he still needs to be on the medication. Chronic Low Back Pain:  Patient is S/P lumbar laminectomy in 2014. He takes Norco 5-325 every 6 hrs as needed. He feels that the medication keeps him functional.   He does not take it very often. His back pain flared 1 week ago with rainy weather. His wife states that he does not do much physical activity. Congestive Heart Failure:  Patient sees Dr. Tamera Ramos and takes Margarita Mckeon and Coreg. He has finished cardiac rehab. Multiple Sclerosis:  Patient was taken off the Tedfidera by his Neurologist due to blood abnormalities. He had another MRI and was told there were no new lesions. Erectile Dysfunction:  Pt complains of trouble maintaining erections. Review of Systems   Respiratory: Negative for shortness of breath. Cardiovascular: Positive for palpitations. Negative for chest pain.      /80 (Site: Left Arm)   Wt 222 lb (100.7 kg)   BMI 34.77 kg/m²    Objective:   Physical Exam   Constitutional: He is oriented to person, place, and time. He appears well-developed and well-nourished. No distress. HENT:   Head: Normocephalic. Right Ear: External ear normal.   Left Ear: External ear normal.   Mouth/Throat: Oropharynx is clear and moist. No oropharyngeal exudate. Neck: No JVD present. No thyromegaly present. Cardiovascular: Normal rate, regular rhythm, normal heart sounds and intact distal pulses. No murmur heard. Pulmonary/Chest: Effort normal and breath sounds normal. He has no wheezes. He has no rales. Musculoskeletal: He exhibits no edema. Lymphadenopathy:     He has no cervical adenopathy. Neurological: He is alert and oriented to person, place, and time. Assessment:      Hypertension  Hyperlipidemia  Depression  Chronic Low Back Pain  Congestive Heart Failure  Multiple Sclerosis  Erectile Dysfunction       Plan:      Chem 7, Lipid Panel, AST, ALT  Rx Sildenafil 20 mg 1-2 daily as needed   OARRS report was reviewed  Medications refilled   Prevnar 13 Shot given  Rx given for Shingrix shot at the pharmacy. Abdominal US for AAA ordered  RTO 6 months for Hypertension / Depression     Controlled Substances Monitoring:     RX Monitoring 7/24/2018   Attestation The Prescription Monitoring Report was requested today but not available. Documentation No signs of potential drug abuse or diversion identified. Chronic Pain Functional status reviewed - continues with improved or maintaining ADL's. Medication Contracts Existing medication contract.

## 2018-07-24 ENCOUNTER — OFFICE VISIT (OUTPATIENT)
Dept: FAMILY MEDICINE CLINIC | Age: 63
End: 2018-07-24

## 2018-07-24 VITALS — BODY MASS INDEX: 34.77 KG/M2 | SYSTOLIC BLOOD PRESSURE: 130 MMHG | WEIGHT: 222 LBS | DIASTOLIC BLOOD PRESSURE: 80 MMHG

## 2018-07-24 DIAGNOSIS — F52.21 ERECTILE DYSFUNCTION OF NON-ORGANIC ORIGIN: ICD-10-CM

## 2018-07-24 DIAGNOSIS — Z23 NEED FOR PNEUMOCOCCAL VACCINATION: ICD-10-CM

## 2018-07-24 DIAGNOSIS — M54.40 CHRONIC BILATERAL LOW BACK PAIN WITH SCIATICA, SCIATICA LATERALITY UNSPECIFIED: ICD-10-CM

## 2018-07-24 DIAGNOSIS — F32.4 MAJOR DEPRESSION SINGLE EPISODE, IN PARTIAL REMISSION (HCC): ICD-10-CM

## 2018-07-24 DIAGNOSIS — I10 ESSENTIAL HYPERTENSION: Primary | ICD-10-CM

## 2018-07-24 DIAGNOSIS — G89.29 CHRONIC BILATERAL LOW BACK PAIN WITH SCIATICA, SCIATICA LATERALITY UNSPECIFIED: ICD-10-CM

## 2018-07-24 DIAGNOSIS — M51.16 LUMBAR DISC DISEASE WITH RADICULOPATHY: ICD-10-CM

## 2018-07-24 DIAGNOSIS — I50.22 HEART FAILURE, CHRONIC SYSTOLIC (HCC): ICD-10-CM

## 2018-07-24 DIAGNOSIS — G89.29 CHRONIC MIDLINE LOW BACK PAIN WITHOUT SCIATICA: ICD-10-CM

## 2018-07-24 DIAGNOSIS — Z13.6 SCREENING FOR AAA (ABDOMINAL AORTIC ANEURYSM): ICD-10-CM

## 2018-07-24 DIAGNOSIS — E78.00 HYPERCHOLESTEROLEMIA: ICD-10-CM

## 2018-07-24 DIAGNOSIS — M54.50 CHRONIC MIDLINE LOW BACK PAIN WITHOUT SCIATICA: ICD-10-CM

## 2018-07-24 DIAGNOSIS — G35 MS (MULTIPLE SCLEROSIS) (HCC): ICD-10-CM

## 2018-07-24 DIAGNOSIS — Z23 NEED FOR ZOSTER VACCINATION: ICD-10-CM

## 2018-07-24 LAB
ALT SERPL-CCNC: 17 U/L (ref 10–40)
ANION GAP SERPL CALCULATED.3IONS-SCNC: 17 MMOL/L (ref 3–16)
AST SERPL-CCNC: 15 U/L (ref 15–37)
BUN BLDV-MCNC: 25 MG/DL (ref 7–20)
CALCIUM SERPL-MCNC: 9.8 MG/DL (ref 8.3–10.6)
CHLORIDE BLD-SCNC: 101 MMOL/L (ref 99–110)
CHOLESTEROL, TOTAL: 118 MG/DL (ref 0–199)
CO2: 21 MMOL/L (ref 21–32)
CREAT SERPL-MCNC: 1.3 MG/DL (ref 0.8–1.3)
GFR AFRICAN AMERICAN: >60
GFR NON-AFRICAN AMERICAN: 55
GLUCOSE BLD-MCNC: 91 MG/DL (ref 70–99)
HDLC SERPL-MCNC: 25 MG/DL (ref 40–60)
LDL CHOLESTEROL CALCULATED: 65 MG/DL
POTASSIUM SERPL-SCNC: 4.5 MMOL/L (ref 3.5–5.1)
SODIUM BLD-SCNC: 139 MMOL/L (ref 136–145)
TRIGL SERPL-MCNC: 139 MG/DL (ref 0–150)
VLDLC SERPL CALC-MCNC: 28 MG/DL

## 2018-07-24 PROCEDURE — 1101F PT FALLS ASSESS-DOCD LE1/YR: CPT | Performed by: FAMILY MEDICINE

## 2018-07-24 PROCEDURE — 1123F ACP DISCUSS/DSCN MKR DOCD: CPT | Performed by: FAMILY MEDICINE

## 2018-07-24 PROCEDURE — 99214 OFFICE O/P EST MOD 30 MIN: CPT | Performed by: FAMILY MEDICINE

## 2018-07-24 PROCEDURE — 3017F COLORECTAL CA SCREEN DOC REV: CPT | Performed by: FAMILY MEDICINE

## 2018-07-24 PROCEDURE — G8417 CALC BMI ABV UP PARAM F/U: HCPCS | Performed by: FAMILY MEDICINE

## 2018-07-24 PROCEDURE — G0009 ADMIN PNEUMOCOCCAL VACCINE: HCPCS | Performed by: FAMILY MEDICINE

## 2018-07-24 PROCEDURE — G8427 DOCREV CUR MEDS BY ELIG CLIN: HCPCS | Performed by: FAMILY MEDICINE

## 2018-07-24 PROCEDURE — 36415 COLL VENOUS BLD VENIPUNCTURE: CPT | Performed by: FAMILY MEDICINE

## 2018-07-24 PROCEDURE — 4040F PNEUMOC VAC/ADMIN/RCVD: CPT | Performed by: FAMILY MEDICINE

## 2018-07-24 PROCEDURE — G8598 ASA/ANTIPLAT THER USED: HCPCS | Performed by: FAMILY MEDICINE

## 2018-07-24 PROCEDURE — 90670 PCV13 VACCINE IM: CPT | Performed by: FAMILY MEDICINE

## 2018-07-24 PROCEDURE — 1036F TOBACCO NON-USER: CPT | Performed by: FAMILY MEDICINE

## 2018-07-24 RX ORDER — SILDENAFIL CITRATE 20 MG/1
20-40 TABLET ORAL DAILY PRN
Qty: 15 TABLET | Refills: 5 | Status: SHIPPED | OUTPATIENT
Start: 2018-07-24 | End: 2019-04-15 | Stop reason: ALTCHOICE

## 2018-07-24 RX ORDER — HYDROCODONE BITARTRATE AND ACETAMINOPHEN 5; 325 MG/1; MG/1
1 TABLET ORAL EVERY 6 HOURS PRN
Qty: 120 TABLET | Refills: 0 | Status: SHIPPED | OUTPATIENT
Start: 2018-07-24 | End: 2018-10-15 | Stop reason: SDUPTHER

## 2018-08-07 ENCOUNTER — OFFICE VISIT (OUTPATIENT)
Dept: CARDIOLOGY CLINIC | Age: 63
End: 2018-08-07

## 2018-08-07 ENCOUNTER — PROCEDURE VISIT (OUTPATIENT)
Dept: CARDIOLOGY CLINIC | Age: 63
End: 2018-08-07

## 2018-08-07 VITALS
SYSTOLIC BLOOD PRESSURE: 100 MMHG | HEIGHT: 67 IN | BODY MASS INDEX: 35.16 KG/M2 | DIASTOLIC BLOOD PRESSURE: 68 MMHG | HEART RATE: 80 BPM | OXYGEN SATURATION: 97 % | WEIGHT: 224 LBS

## 2018-08-07 DIAGNOSIS — I50.22 HEART FAILURE, CHRONIC SYSTOLIC (HCC): ICD-10-CM

## 2018-08-07 DIAGNOSIS — R40.0 DAYTIME SOMNOLENCE: ICD-10-CM

## 2018-08-07 DIAGNOSIS — Z95.810 BIVENTRICULAR AUTOMATIC IMPLANTABLE CARDIOVERTER DEFIBRILLATOR IN SITU: ICD-10-CM

## 2018-08-07 DIAGNOSIS — I25.5 ISCHEMIC CARDIOMYOPATHY: Chronic | ICD-10-CM

## 2018-08-07 DIAGNOSIS — I50.22 CHRONIC SYSTOLIC CHF (CONGESTIVE HEART FAILURE), NYHA CLASS 3 (HCC): ICD-10-CM

## 2018-08-07 DIAGNOSIS — I44.7 LBBB (LEFT BUNDLE BRANCH BLOCK): ICD-10-CM

## 2018-08-07 DIAGNOSIS — E78.5 HYPERLIPIDEMIA LDL GOAL <70: ICD-10-CM

## 2018-08-07 DIAGNOSIS — I25.10 CORONARY ARTERY DISEASE INVOLVING NATIVE CORONARY ARTERY OF NATIVE HEART WITHOUT ANGINA PECTORIS: Primary | ICD-10-CM

## 2018-08-07 PROCEDURE — 4040F PNEUMOC VAC/ADMIN/RCVD: CPT | Performed by: INTERNAL MEDICINE

## 2018-08-07 PROCEDURE — 93284 PRGRMG EVAL IMPLANTABLE DFB: CPT | Performed by: INTERNAL MEDICINE

## 2018-08-07 PROCEDURE — 3017F COLORECTAL CA SCREEN DOC REV: CPT | Performed by: INTERNAL MEDICINE

## 2018-08-07 PROCEDURE — 93290 INTERROG DEV EVAL ICPMS IP: CPT | Performed by: INTERNAL MEDICINE

## 2018-08-07 PROCEDURE — G8598 ASA/ANTIPLAT THER USED: HCPCS | Performed by: INTERNAL MEDICINE

## 2018-08-07 PROCEDURE — G8427 DOCREV CUR MEDS BY ELIG CLIN: HCPCS | Performed by: INTERNAL MEDICINE

## 2018-08-07 PROCEDURE — G8417 CALC BMI ABV UP PARAM F/U: HCPCS | Performed by: INTERNAL MEDICINE

## 2018-08-07 PROCEDURE — 99214 OFFICE O/P EST MOD 30 MIN: CPT | Performed by: INTERNAL MEDICINE

## 2018-08-07 PROCEDURE — 1036F TOBACCO NON-USER: CPT | Performed by: INTERNAL MEDICINE

## 2018-08-07 PROCEDURE — 1101F PT FALLS ASSESS-DOCD LE1/YR: CPT | Performed by: INTERNAL MEDICINE

## 2018-08-07 PROCEDURE — 1123F ACP DISCUSS/DSCN MKR DOCD: CPT | Performed by: INTERNAL MEDICINE

## 2018-08-07 NOTE — PROGRESS NOTES
[facility]   Daily Progress Note      Mr. Franci Ware is 62 y.o. male with a past medical history significant for prior TI/CVA, Multiple Sclerosis and hypertension who presents for the evaluation of an abnormal stress test and shortness of breath. I performed a left heart catheterization for him demonstrating severe LV systolic dysfunction and an occluded Circumflex. The Circumflex was stented with two CORBY resulting in STEFFI 3 flow. A 75% large OM1 lesion was also intervened upon with a CORBY. He has a residual 70% lesion in a large first diagonal branch of the LAD (bifurcating LAD really). He was started on medical therapy for his LVEF of 30%. I referred him to Dr. Oswaldo Grullon who put in an MRI compatible Medtronic BiVentricular pacemaker/AICD on 12/16/16. His LV function did not significantly improve when I repeated his echo so I started him on Entresto therapy. He returns to the office today in follow-up. Since we last saw Trey Christopher he reports that he is feeling \"okay\". He is currently taking a full dose of Entresto 24-26. He presents with his wife today in office. She states he has been sleeping most of the day. He reports he is able to sleep well at night. He has never been screened for sleep apnea. The most amount of exertion he participates in is playing golf one day per week. He denies any chest pain or awareness of his heart racing. He denies shortness of breath. Current Outpatient Prescriptions   Medication Sig Dispense Refill    HYDROcodone-acetaminophen (NORCO) 5-325 MG per tablet Take 1 tablet by mouth every 6 hours as needed for Pain for up to 30 days. . 120 tablet 0    sildenafil (REVATIO) 20 MG tablet Take 1-2 tablets by mouth daily as needed (erectile dysfunction) 15 tablet 5    sacubitril-valsartan (ENTRESTO) 24-26 MG per tablet TAKE 1/2 TABLET TWICE DAILY 90 tablet 3    diclofenac (VOLTAREN) 75 MG EC tablet TAKE 1 TABLET TWICE DAILY 180 tablet 0    buPROPion (WELLBUTRIN SR) 150 MG

## 2018-08-28 ENCOUNTER — HOSPITAL ENCOUNTER (OUTPATIENT)
Dept: ULTRASOUND IMAGING | Age: 63
Discharge: HOME | End: 2018-08-28
Attending: FAMILY MEDICINE | Admitting: FAMILY MEDICINE

## 2018-08-28 ENCOUNTER — HOSPITAL ENCOUNTER (OUTPATIENT)
Dept: GENERAL RADIOLOGY | Age: 63
Discharge: HOME | End: 2018-08-28

## 2018-08-28 ENCOUNTER — HOSPITAL ENCOUNTER (OUTPATIENT)
Dept: NON INVASIVE DIAGNOSTICS | Age: 63
Discharge: HOME | End: 2018-08-28
Attending: INTERNAL MEDICINE | Admitting: INTERNAL MEDICINE

## 2018-08-28 DIAGNOSIS — Z13.6 SCREENING FOR AAA (ABDOMINAL AORTIC ANEURYSM): ICD-10-CM

## 2018-08-28 DIAGNOSIS — I25.10 CORONARY ARTERY DISEASE INVOLVING NATIVE CORONARY ARTERY OF NATIVE HEART WITHOUT ANGINA PECTORIS: ICD-10-CM

## 2018-08-28 DIAGNOSIS — Z13.6 ENCOUNTER FOR SCREENING FOR CARDIOVASCULAR DISORDERS: ICD-10-CM

## 2018-08-28 DIAGNOSIS — I50.22 CHRONIC SYSTOLIC CHF (CONGESTIVE HEART FAILURE), NYHA CLASS 3 (HCC): ICD-10-CM

## 2018-08-28 DIAGNOSIS — R13.10 DYSPHAGIA, UNSPECIFIED TYPE: ICD-10-CM

## 2018-08-28 LAB
LV EF: 25 %
LVEF MODALITY: NORMAL

## 2018-08-28 NOTE — PROCEDURES
confirmed MBSS July 2, 2018. Objective:     Assessment Diagnosis: Oropharyngeal Dysphagia    Diet prior to study:   Current Diet Solid Consistency: Regular  Current Diet Liquid Consistency: Thin     Pain:  Pain Assessment  Patient Currently in Pain: Denies    Cognitive/Behavior   Behavior/Cognition: Alert, Cooperative, Pleasant mood. Pt was oriented x 3. Pt was a vague historian at times. Pt was able to follow one step commands    Vision/Hearing  Vision  Vision: Within Functional Limits (for procedure)  Hearing  Hearing: Within functional limits (for procedure)    Consistencies Assessed    Reg solid, Mechanical soft solid, Puree, Honey teaspoon, Nectar cup, Nectar  teaspoon, Nectar straw, Thin cup, Thin straw (isolated sips one at a time; and consecutive sips in succession)    Positioning   Patient Position: Lateral;A-P and Patient Degrees: Upright in Videofluoroscopic Chair    Indicators of Oral Phase Dysfunction   Oral Phase - Major Contributing Deficits  Weak Lingual Manipulation: Reg solid, Mechanical soft solid  Reduced Posterior Propulsion: Reg solid, Mechanical soft solid  Reduced Bolus Control:  (Disorganized )  Decreased Bolus Cohesion: Reg solid, Mechanical soft solid  Lingual / Palatal Residue: All (but mild+ post swallow of foods)  Premature Bolus Loss to Pharynx:  (episodes of premature loss of thin and thick liquids)  Reduced Tongue Base Retraction: Reg solid  Oral Phase comment: pt is able to clear oral residue across consistencies with clearance swallow    Indicators of Pharyngeal Phase Dysfunction  Pharyngeal Phase - Major Contributing Deficits  Delayed Swallow Initiation: All  Premature Spillage to Valleculae: Nectar cup;Nectar teaspoon;Nectar straw; Thin straw; Thin cup;Honey teaspoon  Premature Spillage to Pyriform:  Thin straw;Nectar straw  Reduced Pharyngeal Peristalsis: Reg solid;Mechanical soft solid  Reduced Laryngeal Elevation:  (thin liquids and inconsistently nectar thick swallow; reduced tongue base retraction; reduced laryngeal elevation; reduced pharyngeal clearance and sensory deficits. Concern for potential CP dysfunction 2/2 to delayed UES opening and narrow PES. Symptoms:  · Episodes of premature loss of thin and thick liquids to the pharynx (beyond the valleculae)  · Gradual pooling of masticated food to the pharynx to the level of valleculae 2/2 to lingual mashing A-P oral transit  · Pooling of thin and thick liquids beyond the valleculae prior to swallow  · Inconsistent episodes of shallow penetration of thin and nectar thick liquids  · An Episode of deep penetration/aspiration of thin liquids when taking consecutive straw drinks without sensation and without clearance  · Post swallow pharyngeal residue in the pyriform sinus all consistencies  · Post swallow pharyngeal residue in valleculae and pyriform sinus post swallow of food consistencies    3. Analysis of compensatory strategies  · Lingual hold and chin tuck minimized if not eliminated penetration of thin liquids (in isolation and with straw drinks)  · Chin tuck did not minimize pharyngeal residue post swallow  · Head rotation left or right were not consistent in minimizing pharyngeal residue post swallow (attempted with puree through to mechanical soft and regular solid food in attempts to reduce pyriform sinus residue)  · Alternating with thick liquids assisted in clearing food residue-but concern for increase penetration risk  · Pt was able to clear pharyngeal food residue with 1-3 swallows (3 to clear regular solid food residue)     Dysphagia Score: Dysphagia Outcome Severity Scale: Level 4: Mild moderate dysphagia- Intermittent supervision/cueing.  One - two diet consistencies restricted    Aspiration/Penetration Risk:   · penetration/aspiration thin liquids when taking sips in succession via straw;  ·  inconsistent penetration thin and nectar thick via cup  · Use of lingual hold and chin tuck posture minimized outpatient therapy  Patient Education Response: Verbalizes understanding, Needs reinforcement    G-Codes:   Functional Limitations: Swallowing  Swallow Current Status (): At least 40 percent but less than 60 percent impaired, limited or restricted  Swallow Goal Status (): At least 40 percent but less than 60 percent impaired, limited or restricted  Swallow Discharge Status (): At least 40 percent but less than 60 percent impaired, limited or restricted    SLP Individual Minutes  Time In: 1100  Time Out: 1200  Minutes: 60  Coded treatment time  0        Radha Tony,MS,CCC,SLP [id number]  Speech and Language Pathologist  8/28/2018 at 12:23 PM

## 2018-09-11 ENCOUNTER — OFFICE VISIT (OUTPATIENT)
Dept: SLEEP MEDICINE | Age: 63
End: 2018-09-11

## 2018-09-11 VITALS
BODY MASS INDEX: 35.77 KG/M2 | SYSTOLIC BLOOD PRESSURE: 110 MMHG | HEART RATE: 73 BPM | RESPIRATION RATE: 20 BRPM | OXYGEN SATURATION: 95 % | TEMPERATURE: 97.6 F | DIASTOLIC BLOOD PRESSURE: 70 MMHG | HEIGHT: 66 IN | WEIGHT: 222.6 LBS

## 2018-09-11 DIAGNOSIS — G47.33 OSA (OBSTRUCTIVE SLEEP APNEA): Primary | ICD-10-CM

## 2018-09-11 DIAGNOSIS — I42.9 CARDIOMYOPATHY, UNSPECIFIED TYPE (HCC): ICD-10-CM

## 2018-09-11 DIAGNOSIS — E66.09 NON MORBID OBESITY DUE TO EXCESS CALORIES: ICD-10-CM

## 2018-09-11 DIAGNOSIS — G35 H/O MULTIPLE SCLEROSIS (HCC): ICD-10-CM

## 2018-09-11 PROCEDURE — G8427 DOCREV CUR MEDS BY ELIG CLIN: HCPCS | Performed by: PSYCHIATRY & NEUROLOGY

## 2018-09-11 PROCEDURE — G8417 CALC BMI ABV UP PARAM F/U: HCPCS | Performed by: PSYCHIATRY & NEUROLOGY

## 2018-09-11 PROCEDURE — 1036F TOBACCO NON-USER: CPT | Performed by: PSYCHIATRY & NEUROLOGY

## 2018-09-11 PROCEDURE — G8598 ASA/ANTIPLAT THER USED: HCPCS | Performed by: PSYCHIATRY & NEUROLOGY

## 2018-09-11 PROCEDURE — 99204 OFFICE O/P NEW MOD 45 MIN: CPT | Performed by: PSYCHIATRY & NEUROLOGY

## 2018-09-11 PROCEDURE — 3017F COLORECTAL CA SCREEN DOC REV: CPT | Performed by: PSYCHIATRY & NEUROLOGY

## 2018-09-11 PROCEDURE — 1123F ACP DISCUSS/DSCN MKR DOCD: CPT | Performed by: PSYCHIATRY & NEUROLOGY

## 2018-09-11 PROCEDURE — 1101F PT FALLS ASSESS-DOCD LE1/YR: CPT | Performed by: PSYCHIATRY & NEUROLOGY

## 2018-09-11 PROCEDURE — 4040F PNEUMOC VAC/ADMIN/RCVD: CPT | Performed by: PSYCHIATRY & NEUROLOGY

## 2018-09-11 ASSESSMENT — SLEEP AND FATIGUE QUESTIONNAIRES
HOW LIKELY ARE YOU TO NOD OFF OR FALL ASLEEP WHILE LYING DOWN TO REST IN THE AFTERNOON WHEN CIRCUMSTANCES PERMIT: 3
HOW LIKELY ARE YOU TO NOD OFF OR FALL ASLEEP WHILE SITTING AND READING: 0
HOW LIKELY ARE YOU TO NOD OFF OR FALL ASLEEP WHILE WATCHING TV: 1
NECK CIRCUMFERENCE (INCHES): 17
HOW LIKELY ARE YOU TO NOD OFF OR FALL ASLEEP IN A CAR, WHILE STOPPED FOR A FEW MINUTES IN TRAFFIC: 0
HOW LIKELY ARE YOU TO NOD OFF OR FALL ASLEEP WHILE SITTING QUIETLY AFTER LUNCH WITHOUT ALCOHOL: 0
HOW LIKELY ARE YOU TO NOD OFF OR FALL ASLEEP WHEN YOU ARE A PASSENGER IN A CAR FOR AN HOUR WITHOUT A BREAK: 0
ESS TOTAL SCORE: 4
HOW LIKELY ARE YOU TO NOD OFF OR FALL ASLEEP WHILE SITTING AND TALKING TO SOMEONE: 0
HOW LIKELY ARE YOU TO NOD OFF OR FALL ASLEEP WHILE SITTING INACTIVE IN A PUBLIC PLACE: 0

## 2018-09-11 ASSESSMENT — ENCOUNTER SYMPTOMS
GASTROINTESTINAL NEGATIVE: 1
ALLERGIC/IMMUNOLOGIC NEGATIVE: 1
COUGH: 1

## 2018-09-11 NOTE — PROGRESS NOTES
license - N/A      Previous Report(s) Reviewed: historical medical records         Social History     Social History    Marital status:      Spouse name: N/A    Number of children: 1    Years of education: N/A     Occupational History    Disabled      Social History Main Topics    Smoking status: Former Smoker    Smokeless tobacco: Former User      Comment: smoked on and off as a teen    Alcohol use Yes      Comment: occas beer    Drug use: No    Sexual activity: Not on file     Other Topics Concern    Not on file     Social History Narrative    No narrative on file       Prior to Admission medications    Medication Sig Start Date End Date Taking? Authorizing Provider   sacubitril-valsartan (ENTRESTO) 24-26 MG per tablet Take 1 tablet by mouth 2 times daily 8/7/18  Yes Kaveh Smith MD   sildenafil (REVATIO) 20 MG tablet Take 1-2 tablets by mouth daily as needed (erectile dysfunction) 7/24/18  Yes Zander Mason DO   diclofenac (VOLTAREN) 75 MG EC tablet TAKE 1 TABLET TWICE DAILY 6/23/18  Yes Zander Mason DO   buPROPion [facility] SR) 150 MG extended release tablet Take 1 tablet by mouth 2 times daily 5/10/18  Yes Camilla Parks, DO   venlafaxine (EFFEXOR XR) 150 MG extended release capsule TAKE 1 CAPSULE EVERY DAY 3/21/18  Yes Sarah Hummel, DO   atorvastatin (LIPITOR) 20 MG tablet TAKE 1 TABLET EVERY DAY 9/14/17  Yes Kaveh Smith MD   carvedilol (COREG) 3.125 MG tablet TAKE 1 TABLET TWICE DAILY WITH MEALS 6/27/17  Yes VIANEY Sosa CNP   Cholecalciferol (VITAMIN D) 2000 UNITS CAPS capsule Take 1 capsule by mouth daily. Yes Historical Provider, MD   aspirin 81 MG EC tablet Take 81 mg by mouth daily.      Yes Historical Provider, MD       Allergies as of 09/11/2018    (No Known Allergies)       Patient Active Problem List   Diagnosis    TIA (transient ischemic attack)    [facility]    [facility]    Rotator cuff tear    Lumbar disc disease with radiculopathy    CHF with left ventricular diastolic dysfunction, NYHA class 2 (HCC)    Ischemic cardiomyopathy    Essential hypertension    Non morbid obesity due to excess calories    Hypercholesterolemia    Chronic midline low back pain without sciatica    Coronary artery disease involving native coronary artery of native heart without angina pectoris    Major depression single episode, in partial remission ([address]    Biventricular automatic implantable cardioverter defibrillator in situ    Cardiomyopathy ([address]    Heart failure, chronic systolic ([address]    ICD (implantable cardioverter-defibrillator), biventricular, in situ    Erectile dysfunction of non-organic origin       Past Medical History:   Diagnosis Date    CHF with left ventricular diastolic dysfunction, NYHA class 2 (HCC)     Chronic midline low back pain without sciatica     Coronary artery disease involving native coronary artery of native heart without angina pectoris 1-14-15    Drug Eluting Stents x 3 / Dr. Kelly Hickman Erectile dysfunction of non-organic origin     Essential hypertension     Hemorrhagic stroke ([address]     Hypercholesterolemia     Ischemic cardiomyopathy     Dr. Kelly Hickman Lumbar disc disease with radiculopathy     laminectomy dr Edy Torres  1/2014    Major depression single episode, in partial remission ([address]     MS (multiple sclerosis) ([address]     Dr. Nick Love Non morbid obesity due to excess calories     Rotator cuff tear     Lt    TIA (transient ischemic attack)        Past Surgical History:   Procedure Laterality Date    CARDIAC DEFIBRILLATOR PLACEMENT  12/2016    CORONARY ANGIOPLASTY WITH STENT PLACEMENT  1-14-15    Drug Eluting Stents x 3    LUMBAR LAMINECTOMY  jan 2014    dr Lorraine Pires      from arm       Family History   Problem Relation Age of Onset    High Blood Pressure Father     Stroke Father     Diabetes Other reflexes. No cranial nerve deficit. Skin: Skin is warm. Psychiatric: He has a normal mood and affect. Nursing note and vitals reviewed. Assessment:    Obstructive sleep apnea especially with snoring, daytime sleepiness, large neck circumference, Mallampati class of 3 and obesity. Diagnosis Orders   1. SAMANTHA (obstructive sleep apnea)  Baseline Diagnostic Sleep Study    Sleep Study with PAP Titration   2. Cardiomyopathy, unspecified type [facility])  Baseline Diagnostic Sleep Study    Sleep Study with PAP Titration   3. Non morbid obesity due to excess calories  Baseline Diagnostic Sleep Study    Sleep Study with PAP Titration   4. H/O multiple sclerosis  Baseline Diagnostic Sleep Study    Sleep Study with PAP Titration     Plan:     Patient was counseled about the pathophysiology of obstructive sleep apnea syndrome and the methods for evaluating its presence and severity. Patient was counseled to avoid driving and other potentially hazardous circumstances if the patient is experiencing excessive sleepiness. Treatment considerations include the use of nasal CPAP, oral dental appliance or a surgical intervention, which should be based on otolarygologic findings, In the meantime, the patient should be cautioned to avoid the use of alcohol or other depressant medications because of potential for increasing the duration and severity of apnea and cautioned regarding driving or operating and dangerous equipment if the patient is experiencing daytime sleepiness. .        Orders Placed This Encounter   Procedures    Baseline Diagnostic Sleep Study    Sleep Study with PAP Titration       Return in about 3 months (around 12/11/2018) for to review the PSG and CPAP usage, Reveiwing CPAP usage and compliance report and tro.     Lul Kraft MD  Medical Director [address] [place] [address]

## 2018-09-24 ENCOUNTER — HOSPITAL ENCOUNTER (OUTPATIENT)
Dept: SLEEP CENTER | Age: 63
Discharge: HOME | End: 2018-09-24
Payer: MEDICARE

## 2018-09-24 DIAGNOSIS — G47.33 OSA (OBSTRUCTIVE SLEEP APNEA): ICD-10-CM

## 2018-09-24 DIAGNOSIS — G35 H/O MULTIPLE SCLEROSIS (HCC): ICD-10-CM

## 2018-09-24 DIAGNOSIS — I42.9 CARDIOMYOPATHY, UNSPECIFIED TYPE (HCC): ICD-10-CM

## 2018-09-24 DIAGNOSIS — E66.09 NON MORBID OBESITY DUE TO EXCESS CALORIES: ICD-10-CM

## 2018-09-24 PROCEDURE — 95810 POLYSOM 6/> YRS 4/> PARAM: CPT

## 2018-09-24 PROCEDURE — 95810 POLYSOM 6/> YRS 4/> PARAM: CPT | Performed by: PSYCHIATRY & NEUROLOGY

## 2018-10-01 ENCOUNTER — TELEPHONE (OUTPATIENT)
Dept: PULMONOLOGY | Age: 63
End: 2018-10-01

## 2018-10-11 ENCOUNTER — TELEPHONE (OUTPATIENT)
Dept: FAMILY MEDICINE CLINIC | Age: 63
End: 2018-10-11

## 2018-10-11 DIAGNOSIS — G89.29 CHRONIC RIGHT SHOULDER PAIN: ICD-10-CM

## 2018-10-11 DIAGNOSIS — M25.511 CHRONIC RIGHT SHOULDER PAIN: ICD-10-CM

## 2018-10-11 RX ORDER — DICLOFENAC SODIUM 75 MG/1
TABLET, DELAYED RELEASE ORAL
Qty: 180 TABLET | Refills: 0 | Status: CANCELLED | OUTPATIENT
Start: 2018-10-11

## 2018-10-11 RX ORDER — DICLOFENAC SODIUM 75 MG/1
TABLET, DELAYED RELEASE ORAL
Qty: 180 TABLET | Refills: 0 | Status: SHIPPED | OUTPATIENT
Start: 2018-10-11 | End: 2019-01-21

## 2018-10-11 NOTE — TELEPHONE ENCOUNTER
Medication Refill    When was your last appointment with cardiology?  (if 1year or longer, please schedule an appointment)    Medication needing refilled: Entresto and Carvedilol and Atorvastatin    Doseage of the medication: 24-26 mg and 3.125 mg   20 mg     How are you taking this medication (QD, BID, TID, QID, PRN):  Take 1 tablet by mouth 2 times daily  TAKE 1 TABLET TWICE DAILY WITH MEALS  TAKE 1 TABLET EVERY DAY    Patient want a 30 or 90 day supply called in:    Which Pharmacy are we sending the medication to:    [facility] on [address]  [address]   [phone]

## 2018-10-12 RX ORDER — ATORVASTATIN CALCIUM 20 MG/1
TABLET, FILM COATED ORAL
Qty: 90 TABLET | Refills: 3 | Status: SHIPPED | OUTPATIENT
Start: 2018-10-12 | End: 2019-01-01 | Stop reason: SDUPTHER

## 2018-10-15 DIAGNOSIS — M51.16 LUMBAR DISC DISEASE WITH RADICULOPATHY: ICD-10-CM

## 2018-10-15 DIAGNOSIS — M54.40 CHRONIC BILATERAL LOW BACK PAIN WITH SCIATICA, SCIATICA LATERALITY UNSPECIFIED: ICD-10-CM

## 2018-10-15 DIAGNOSIS — G89.29 CHRONIC BILATERAL LOW BACK PAIN WITH SCIATICA, SCIATICA LATERALITY UNSPECIFIED: ICD-10-CM

## 2018-10-15 RX ORDER — HYDROCODONE BITARTRATE AND ACETAMINOPHEN 5; 325 MG/1; MG/1
1 TABLET ORAL EVERY 6 HOURS PRN
Qty: 120 TABLET | Refills: 0 | Status: SHIPPED | OUTPATIENT
Start: 2018-10-15 | End: 2019-01-24 | Stop reason: SDUPTHER

## 2018-10-18 ENCOUNTER — HOSPITAL ENCOUNTER (OUTPATIENT)
Dept: SLEEP CENTER | Age: 63
Discharge: HOME | End: 2018-10-18
Payer: MEDICARE

## 2018-10-18 DIAGNOSIS — I42.9 CARDIOMYOPATHY, UNSPECIFIED TYPE (HCC): ICD-10-CM

## 2018-10-18 DIAGNOSIS — G35 H/O MULTIPLE SCLEROSIS (HCC): ICD-10-CM

## 2018-10-18 DIAGNOSIS — G47.33 OSA (OBSTRUCTIVE SLEEP APNEA): ICD-10-CM

## 2018-10-18 DIAGNOSIS — E66.09 NON MORBID OBESITY DUE TO EXCESS CALORIES: ICD-10-CM

## 2018-10-18 PROCEDURE — 95811 POLYSOM 6/>YRS CPAP 4/> PARM: CPT

## 2018-10-19 PROCEDURE — 95811 POLYSOM 6/>YRS CPAP 4/> PARM: CPT | Performed by: PSYCHIATRY & NEUROLOGY

## 2018-10-23 ENCOUNTER — TELEPHONE (OUTPATIENT)
Dept: PULMONOLOGY | Age: 63
End: 2018-10-23

## 2018-11-26 RX ORDER — BUPROPION HYDROCHLORIDE 150 MG/1
TABLET, EXTENDED RELEASE ORAL
Qty: 180 TABLET | Refills: 0 | Status: SHIPPED | OUTPATIENT
Start: 2018-11-26 | End: 2018-11-27 | Stop reason: SDUPTHER

## 2018-11-27 ENCOUNTER — TELEPHONE (OUTPATIENT)
Dept: FAMILY MEDICINE CLINIC | Age: 63
End: 2018-11-27

## 2018-11-27 RX ORDER — BUPROPION HYDROCHLORIDE 150 MG/1
TABLET, EXTENDED RELEASE ORAL
Qty: 180 TABLET | Refills: 0 | Status: SHIPPED | OUTPATIENT
Start: 2018-11-27 | End: 2019-04-04

## 2018-11-27 RX ORDER — BUPROPION HYDROCHLORIDE 150 MG/1
TABLET, EXTENDED RELEASE ORAL
Qty: 180 TABLET | Refills: 0 | Status: SHIPPED | OUTPATIENT
Start: 2018-11-27 | End: 2018-11-27 | Stop reason: SDUPTHER

## 2018-11-28 RX ORDER — CARVEDILOL 3.12 MG/1
TABLET ORAL
Qty: 180 TABLET | Refills: 3 | Status: CANCELLED | OUTPATIENT
Start: 2018-11-28

## 2018-11-29 RX ORDER — CARVEDILOL 3.12 MG/1
3.12 TABLET ORAL 2 TIMES DAILY WITH MEALS
Qty: 180 TABLET | Refills: 3 | Status: SHIPPED | OUTPATIENT
Start: 2018-11-29 | End: 2019-01-01

## 2018-12-07 ENCOUNTER — TELEPHONE (OUTPATIENT)
Dept: FAMILY MEDICINE CLINIC | Age: 63
End: 2018-12-07

## 2018-12-18 ENCOUNTER — NURSE ONLY (OUTPATIENT)
Dept: CARDIOLOGY CLINIC | Age: 63
End: 2018-12-18

## 2018-12-18 DIAGNOSIS — Z95.810 ICD (IMPLANTABLE CARDIOVERTER-DEFIBRILLATOR), BIVENTRICULAR, IN SITU: ICD-10-CM

## 2018-12-18 NOTE — LETTER
[address] [facility] [phone]  [address] [phone]    Pacemaker/Defibrillator Clinic          12/20/18        [address] [address]        Dear Charli Brownlee    This letter is to inform you that we received the transmission from your monitor at home that checks your pacemaker and/or defibrillator, or implanted heart monitor. The next date your monitor will automatically transmit will be 3/26/19. Please do not send additional routine transmissions unless specifically requested. Your device and monitor are wireless and most transmit cellularly, but please periodically check your monitor is still plugged in to the electrical outlet. If you still use the telephone land line to send please ensure the connection to the phone hernandez is secure. This will help to ensure successful automatic transmissions in the future. Also, the monitor needs to be close to you while sleeping at night. Please be aware that the remote device transmission sites are periodically monitored only during regular business hours during which simultaneous in-office device clinics are being run. If your transmission requires attention, we will contact you as soon as possible. Thank you.             [address]

## 2019-01-01 ENCOUNTER — TELEPHONE (OUTPATIENT)
Dept: CARDIOLOGY CLINIC | Age: 64
End: 2019-01-01

## 2019-01-01 ENCOUNTER — OFFICE VISIT (OUTPATIENT)
Dept: CARDIOLOGY CLINIC | Age: 64
End: 2019-01-01
Payer: MEDICARE

## 2019-01-01 ENCOUNTER — ANESTHESIA EVENT (OUTPATIENT)
Dept: OPERATING ROOM | Age: 64
End: 2019-01-01
Payer: MEDICARE

## 2019-01-01 ENCOUNTER — ANESTHESIA (OUTPATIENT)
Dept: ENDOSCOPY | Age: 64
End: 2019-01-01
Payer: MEDICARE

## 2019-01-01 ENCOUNTER — APPOINTMENT (OUTPATIENT)
Dept: GENERAL RADIOLOGY | Age: 64
DRG: 394 | End: 2019-01-01
Attending: SURGERY
Payer: MEDICARE

## 2019-01-01 ENCOUNTER — HOSPITAL ENCOUNTER (INPATIENT)
Age: 64
LOS: 4 days | Discharge: HOME | DRG: 445 | End: 2019-08-14
Attending: EMERGENCY MEDICINE | Admitting: INTERNAL MEDICINE
Payer: MEDICARE

## 2019-01-01 ENCOUNTER — ANESTHESIA (OUTPATIENT)
Dept: ENDOSCOPY | Age: 64
DRG: 394 | End: 2019-01-01
Payer: MEDICARE

## 2019-01-01 ENCOUNTER — HOSPITAL ENCOUNTER (OUTPATIENT)
Age: 64
Setting detail: OUTPATIENT SURGERY
Discharge: HOME | End: 2019-10-17
Attending: INTERNAL MEDICINE | Admitting: INTERNAL MEDICINE
Payer: MEDICARE

## 2019-01-01 ENCOUNTER — ANESTHESIA (OUTPATIENT)
Dept: OPERATING ROOM | Age: 64
End: 2019-01-01
Payer: MEDICARE

## 2019-01-01 ENCOUNTER — OFFICE VISIT (OUTPATIENT)
Dept: FAMILY MEDICINE CLINIC | Age: 64
End: 2019-01-01
Payer: MEDICARE

## 2019-01-01 ENCOUNTER — APPOINTMENT (OUTPATIENT)
Dept: GENERAL RADIOLOGY | Age: 64
End: 2019-01-01
Attending: INTERNAL MEDICINE
Payer: MEDICARE

## 2019-01-01 ENCOUNTER — ANESTHESIA EVENT (OUTPATIENT)
Dept: ENDOSCOPY | Age: 64
DRG: 394 | End: 2019-01-01
Payer: MEDICARE

## 2019-01-01 ENCOUNTER — HOSPITAL ENCOUNTER (INPATIENT)
Age: 64
LOS: 2 days | Discharge: HOME HEALTH | DRG: 394 | End: 2019-09-13
Attending: SURGERY | Admitting: SURGERY
Payer: MEDICARE

## 2019-01-01 ENCOUNTER — ANESTHESIA EVENT (OUTPATIENT)
Dept: OPERATING ROOM | Age: 64
DRG: 418 | End: 2019-01-01
Payer: MEDICARE

## 2019-01-01 ENCOUNTER — NURSE ONLY (OUTPATIENT)
Dept: CARDIOLOGY CLINIC | Age: 64
End: 2019-01-01
Payer: MEDICARE

## 2019-01-01 ENCOUNTER — APPOINTMENT (OUTPATIENT)
Dept: NUCLEAR MEDICINE | Age: 64
DRG: 418 | End: 2019-01-01
Payer: MEDICARE

## 2019-01-01 ENCOUNTER — HOSPITAL ENCOUNTER (OUTPATIENT)
Dept: NON INVASIVE DIAGNOSTICS | Age: 64
Discharge: HOME | End: 2019-08-28
Payer: MEDICARE

## 2019-01-01 ENCOUNTER — ANESTHESIA EVENT (OUTPATIENT)
Dept: ENDOSCOPY | Age: 64
End: 2019-01-01
Payer: MEDICARE

## 2019-01-01 ENCOUNTER — PROCEDURE VISIT (OUTPATIENT)
Dept: CARDIOLOGY CLINIC | Age: 64
End: 2019-01-01
Payer: MEDICARE

## 2019-01-01 ENCOUNTER — TELEPHONE (OUTPATIENT)
Dept: FAMILY MEDICINE CLINIC | Age: 64
End: 2019-01-01

## 2019-01-01 ENCOUNTER — APPOINTMENT (OUTPATIENT)
Dept: CT IMAGING | Age: 64
DRG: 418 | End: 2019-01-01
Payer: MEDICARE

## 2019-01-01 ENCOUNTER — HOSPITAL ENCOUNTER (INPATIENT)
Age: 64
LOS: 4 days | Discharge: HOME | DRG: 418 | End: 2019-09-06
Attending: INTERNAL MEDICINE | Admitting: INTERNAL MEDICINE
Payer: MEDICARE

## 2019-01-01 ENCOUNTER — OFFICE VISIT (OUTPATIENT)
Dept: SURGERY | Age: 64
End: 2019-01-01

## 2019-01-01 ENCOUNTER — APPOINTMENT (OUTPATIENT)
Dept: GENERAL RADIOLOGY | Age: 64
DRG: 418 | End: 2019-01-01
Payer: MEDICARE

## 2019-01-01 ENCOUNTER — ANESTHESIA (OUTPATIENT)
Dept: OPERATING ROOM | Age: 64
DRG: 418 | End: 2019-01-01
Payer: MEDICARE

## 2019-01-01 ENCOUNTER — APPOINTMENT (OUTPATIENT)
Dept: CT IMAGING | Age: 64
DRG: 445 | End: 2019-01-01
Payer: MEDICARE

## 2019-01-01 VITALS
HEIGHT: 66 IN | TEMPERATURE: 97.6 F | DIASTOLIC BLOOD PRESSURE: 71 MMHG | BODY MASS INDEX: 34.15 KG/M2 | RESPIRATION RATE: 14 BRPM | WEIGHT: 212.51 LBS | HEART RATE: 66 BPM | SYSTOLIC BLOOD PRESSURE: 116 MMHG | OXYGEN SATURATION: 99 %

## 2019-01-01 VITALS
WEIGHT: 198 LBS | SYSTOLIC BLOOD PRESSURE: 120 MMHG | DIASTOLIC BLOOD PRESSURE: 79 MMHG | BODY MASS INDEX: 31.82 KG/M2 | HEART RATE: 81 BPM | HEIGHT: 66 IN

## 2019-01-01 VITALS
HEART RATE: 57 BPM | RESPIRATION RATE: 16 BRPM | DIASTOLIC BLOOD PRESSURE: 59 MMHG | SYSTOLIC BLOOD PRESSURE: 117 MMHG | HEIGHT: 66 IN | WEIGHT: 199.74 LBS | OXYGEN SATURATION: 96 % | TEMPERATURE: 97.4 F | BODY MASS INDEX: 32.1 KG/M2

## 2019-01-01 VITALS
DIASTOLIC BLOOD PRESSURE: 87 MMHG | HEIGHT: 66 IN | WEIGHT: 214.8 LBS | BODY MASS INDEX: 34.52 KG/M2 | SYSTOLIC BLOOD PRESSURE: 138 MMHG | HEART RATE: 76 BPM

## 2019-01-01 VITALS
OXYGEN SATURATION: 98 % | RESPIRATION RATE: 16 BRPM | DIASTOLIC BLOOD PRESSURE: 62 MMHG | HEART RATE: 63 BPM | SYSTOLIC BLOOD PRESSURE: 166 MMHG | TEMPERATURE: 97 F | WEIGHT: 203.04 LBS | BODY MASS INDEX: 31.87 KG/M2 | HEIGHT: 67 IN

## 2019-01-01 VITALS
SYSTOLIC BLOOD PRESSURE: 90 MMHG | BODY MASS INDEX: 32.47 KG/M2 | HEART RATE: 57 BPM | HEIGHT: 66 IN | WEIGHT: 202 LBS | DIASTOLIC BLOOD PRESSURE: 60 MMHG | OXYGEN SATURATION: 97 %

## 2019-01-01 VITALS
TEMPERATURE: 96.6 F | RESPIRATION RATE: 18 BRPM | OXYGEN SATURATION: 96 % | DIASTOLIC BLOOD PRESSURE: 67 MMHG | SYSTOLIC BLOOD PRESSURE: 142 MMHG

## 2019-01-01 VITALS
BODY MASS INDEX: 33.75 KG/M2 | SYSTOLIC BLOOD PRESSURE: 138 MMHG | OXYGEN SATURATION: 98 % | WEIGHT: 210 LBS | HEIGHT: 66 IN | DIASTOLIC BLOOD PRESSURE: 88 MMHG | HEART RATE: 78 BPM

## 2019-01-01 VITALS
OXYGEN SATURATION: 98 % | SYSTOLIC BLOOD PRESSURE: 107 MMHG | RESPIRATION RATE: 1 BRPM | DIASTOLIC BLOOD PRESSURE: 57 MMHG

## 2019-01-01 VITALS
HEIGHT: 66 IN | DIASTOLIC BLOOD PRESSURE: 75 MMHG | OXYGEN SATURATION: 95 % | RESPIRATION RATE: 18 BRPM | TEMPERATURE: 97.5 F | HEART RATE: 65 BPM | WEIGHT: 197.53 LBS | SYSTOLIC BLOOD PRESSURE: 171 MMHG | BODY MASS INDEX: 31.75 KG/M2

## 2019-01-01 VITALS — BODY MASS INDEX: 31.64 KG/M2 | WEIGHT: 196 LBS

## 2019-01-01 VITALS
SYSTOLIC BLOOD PRESSURE: 185 MMHG | DIASTOLIC BLOOD PRESSURE: 86 MMHG | OXYGEN SATURATION: 100 % | RESPIRATION RATE: 3 BRPM

## 2019-01-01 VITALS — DIASTOLIC BLOOD PRESSURE: 78 MMHG | WEIGHT: 199 LBS | SYSTOLIC BLOOD PRESSURE: 122 MMHG | BODY MASS INDEX: 31.64 KG/M2

## 2019-01-01 DIAGNOSIS — F52.21 ERECTILE DYSFUNCTION OF NON-ORGANIC ORIGIN: ICD-10-CM

## 2019-01-01 DIAGNOSIS — Z95.810 BIVENTRICULAR AUTOMATIC IMPLANTABLE CARDIOVERTER DEFIBRILLATOR IN SITU: ICD-10-CM

## 2019-01-01 DIAGNOSIS — I50.22 CHRONIC SYSTOLIC CHF (CONGESTIVE HEART FAILURE), NYHA CLASS 2 (HCC): ICD-10-CM

## 2019-01-01 DIAGNOSIS — I50.22 HEART FAILURE, CHRONIC SYSTOLIC (HCC): ICD-10-CM

## 2019-01-01 DIAGNOSIS — I25.5 ISCHEMIC CARDIOMYOPATHY: Chronic | ICD-10-CM

## 2019-01-01 DIAGNOSIS — I44.7 LBBB (LEFT BUNDLE BRANCH BLOCK): ICD-10-CM

## 2019-01-01 DIAGNOSIS — Z01.810 PREOPERATIVE CARDIOVASCULAR EXAMINATION: ICD-10-CM

## 2019-01-01 DIAGNOSIS — E78.5 HYPERLIPIDEMIA LDL GOAL <70: ICD-10-CM

## 2019-01-01 DIAGNOSIS — K81.0 ACUTE CHOLECYSTITIS: ICD-10-CM

## 2019-01-01 DIAGNOSIS — K91.89 BILE LEAK, POSTOPERATIVE: ICD-10-CM

## 2019-01-01 DIAGNOSIS — R41.3 MEMORY LOSS, SHORT TERM: ICD-10-CM

## 2019-01-01 DIAGNOSIS — G89.29 CHRONIC MIDLINE LOW BACK PAIN WITHOUT SCIATICA: ICD-10-CM

## 2019-01-01 DIAGNOSIS — E78.00 HYPERCHOLESTEROLEMIA: ICD-10-CM

## 2019-01-01 DIAGNOSIS — I10 ESSENTIAL HYPERTENSION: Primary | ICD-10-CM

## 2019-01-01 DIAGNOSIS — I25.10 CORONARY ARTERY DISEASE INVOLVING NATIVE CORONARY ARTERY OF NATIVE HEART WITHOUT ANGINA PECTORIS: Primary | ICD-10-CM

## 2019-01-01 DIAGNOSIS — I50.30 CHF WITH LEFT VENTRICULAR DIASTOLIC DYSFUNCTION, NYHA CLASS 2 (HCC): ICD-10-CM

## 2019-01-01 DIAGNOSIS — K83.8 BILE LEAK, POSTOPERATIVE: ICD-10-CM

## 2019-01-01 DIAGNOSIS — G89.29 CHRONIC MIDLINE LOW BACK PAIN WITHOUT SCIATICA: Primary | ICD-10-CM

## 2019-01-01 DIAGNOSIS — G35 MS (MULTIPLE SCLEROSIS) (HCC): ICD-10-CM

## 2019-01-01 DIAGNOSIS — K83.9 BILE LEAK: ICD-10-CM

## 2019-01-01 DIAGNOSIS — Z23 NEED FOR PNEUMOCOCCAL VACCINATION: ICD-10-CM

## 2019-01-01 DIAGNOSIS — K81.0 ACUTE CHOLECYSTITIS: Primary | ICD-10-CM

## 2019-01-01 DIAGNOSIS — G89.29 CHRONIC BILATERAL LOW BACK PAIN WITH SCIATICA, SCIATICA LATERALITY UNSPECIFIED: ICD-10-CM

## 2019-01-01 DIAGNOSIS — R40.0 DAYTIME SOMNOLENCE: ICD-10-CM

## 2019-01-01 DIAGNOSIS — M54.50 CHRONIC MIDLINE LOW BACK PAIN WITHOUT SCIATICA: Primary | ICD-10-CM

## 2019-01-01 DIAGNOSIS — M54.50 CHRONIC MIDLINE LOW BACK PAIN WITHOUT SCIATICA: ICD-10-CM

## 2019-01-01 DIAGNOSIS — M51.16 LUMBAR DISC DISEASE WITH RADICULOPATHY: ICD-10-CM

## 2019-01-01 DIAGNOSIS — I25.10 CORONARY ARTERY DISEASE INVOLVING NATIVE CORONARY ARTERY OF NATIVE HEART WITHOUT ANGINA PECTORIS: ICD-10-CM

## 2019-01-01 DIAGNOSIS — F32.4 MAJOR DEPRESSION SINGLE EPISODE, IN PARTIAL REMISSION (HCC): ICD-10-CM

## 2019-01-01 DIAGNOSIS — Z23 NEED FOR INFLUENZA VACCINATION: ICD-10-CM

## 2019-01-01 DIAGNOSIS — R74.01 TRANSAMINITIS: ICD-10-CM

## 2019-01-01 DIAGNOSIS — Z09 HOSPITAL DISCHARGE FOLLOW-UP: ICD-10-CM

## 2019-01-01 DIAGNOSIS — M54.40 CHRONIC BILATERAL LOW BACK PAIN WITH SCIATICA, SCIATICA LATERALITY UNSPECIFIED: ICD-10-CM

## 2019-01-01 LAB
A/G RATIO: 1.1 (ref 1.1–2.2)
A/G RATIO: 1.1 (ref 1.1–2.2)
A/G RATIO: 1.2 (ref 1.1–2.2)
A/G RATIO: 1.3 (ref 1.1–2.2)
A/G RATIO: 1.4 (ref 1.1–2.2)
ALBUMIN SERPL-MCNC: 2.9 G/DL (ref 3.4–5)
ALBUMIN SERPL-MCNC: 3.1 G/DL (ref 3.4–5)
ALBUMIN SERPL-MCNC: 3.3 G/DL (ref 3.4–5)
ALBUMIN SERPL-MCNC: 3.3 G/DL (ref 3.4–5)
ALBUMIN SERPL-MCNC: 3.4 G/DL (ref 3.4–5)
ALBUMIN SERPL-MCNC: 3.4 G/DL (ref 3.4–5)
ALBUMIN SERPL-MCNC: 3.5 G/DL (ref 3.4–5)
ALBUMIN SERPL-MCNC: 3.5 G/DL (ref 3.4–5)
ALBUMIN SERPL-MCNC: 4 G/DL (ref 3.4–5)
ALP BLD-CCNC: 104 U/L (ref 40–129)
ALP BLD-CCNC: 106 U/L (ref 40–129)
ALP BLD-CCNC: 108 U/L (ref 40–129)
ALP BLD-CCNC: 127 U/L (ref 40–129)
ALP BLD-CCNC: 161 U/L (ref 40–129)
ALP BLD-CCNC: 179 U/L (ref 40–129)
ALP BLD-CCNC: 202 U/L (ref 40–129)
ALP BLD-CCNC: 60 U/L (ref 40–129)
ALP BLD-CCNC: 96 U/L (ref 40–129)
ALT SERPL-CCNC: 10 U/L (ref 10–40)
ALT SERPL-CCNC: 105 U/L (ref 10–40)
ALT SERPL-CCNC: 132 U/L (ref 10–40)
ALT SERPL-CCNC: 19 U/L (ref 10–40)
ALT SERPL-CCNC: 21 U/L (ref 10–40)
ALT SERPL-CCNC: 24 U/L (ref 10–40)
ALT SERPL-CCNC: 53 U/L (ref 10–40)
ALT SERPL-CCNC: 66 U/L (ref 10–40)
ALT SERPL-CCNC: 74 U/L (ref 10–40)
ALT SERPL-CCNC: 8 U/L (ref 10–40)
ANION GAP SERPL CALCULATED.3IONS-SCNC: 11 MMOL/L (ref 3–16)
ANION GAP SERPL CALCULATED.3IONS-SCNC: 12 MMOL/L (ref 3–16)
ANION GAP SERPL CALCULATED.3IONS-SCNC: 13 MMOL/L (ref 3–16)
ANION GAP SERPL CALCULATED.3IONS-SCNC: 14 MMOL/L (ref 3–16)
ANION GAP SERPL CALCULATED.3IONS-SCNC: 16 MMOL/L (ref 3–16)
ANION GAP SERPL CALCULATED.3IONS-SCNC: 16 MMOL/L (ref 3–16)
AST SERPL-CCNC: 11 U/L (ref 15–37)
AST SERPL-CCNC: 12 U/L (ref 15–37)
AST SERPL-CCNC: 16 U/L (ref 15–37)
AST SERPL-CCNC: 169 U/L (ref 15–37)
AST SERPL-CCNC: 17 U/L (ref 15–37)
AST SERPL-CCNC: 197 U/L (ref 15–37)
AST SERPL-CCNC: 24 U/L (ref 15–37)
AST SERPL-CCNC: 24 U/L (ref 15–37)
AST SERPL-CCNC: 41 U/L (ref 15–37)
AST SERPL-CCNC: 88 U/L (ref 15–37)
BASOPHILS ABSOLUTE: 0 K/UL (ref 0–0.2)
BASOPHILS ABSOLUTE: 0.1 K/UL (ref 0–0.2)
BASOPHILS ABSOLUTE: 0.1 K/UL (ref 0–0.2)
BASOPHILS ABSOLUTE: 0.2 K/UL (ref 0–0.2)
BASOPHILS RELATIVE PERCENT: 0.1 %
BASOPHILS RELATIVE PERCENT: 0.5 %
BASOPHILS RELATIVE PERCENT: 0.5 %
BASOPHILS RELATIVE PERCENT: 0.6 %
BASOPHILS RELATIVE PERCENT: 0.8 %
BASOPHILS RELATIVE PERCENT: 0.8 %
BASOPHILS RELATIVE PERCENT: 1.7 %
BILIRUB SERPL-MCNC: 0.6 MG/DL (ref 0–1)
BILIRUB SERPL-MCNC: 0.6 MG/DL (ref 0–1)
BILIRUB SERPL-MCNC: 0.9 MG/DL (ref 0–1)
BILIRUB SERPL-MCNC: 1 MG/DL (ref 0–1)
BILIRUB SERPL-MCNC: 1 MG/DL (ref 0–1)
BILIRUB SERPL-MCNC: 1.1 MG/DL (ref 0–1)
BILIRUB SERPL-MCNC: 1.1 MG/DL (ref 0–1)
BILIRUB SERPL-MCNC: 1.2 MG/DL (ref 0–1)
BILIRUB SERPL-MCNC: 2.1 MG/DL (ref 0–1)
BILIRUBIN DIRECT: 0.3 MG/DL (ref 0–0.3)
BILIRUBIN DIRECT: 0.3 MG/DL (ref 0–0.3)
BILIRUBIN FLUID: 22.7 MG/DL
BILIRUBIN URINE: NEGATIVE
BILIRUBIN, INDIRECT: 0.3 MG/DL (ref 0–1)
BILIRUBIN, INDIRECT: 0.6 MG/DL (ref 0–1)
BLOOD, URINE: ABNORMAL
BUN BLDV-MCNC: 10 MG/DL (ref 7–20)
BUN BLDV-MCNC: 10 MG/DL (ref 7–20)
BUN BLDV-MCNC: 12 MG/DL (ref 7–20)
BUN BLDV-MCNC: 12 MG/DL (ref 7–20)
BUN BLDV-MCNC: 14 MG/DL (ref 7–20)
BUN BLDV-MCNC: 15 MG/DL (ref 7–20)
BUN BLDV-MCNC: 15 MG/DL (ref 7–20)
BUN BLDV-MCNC: 16 MG/DL (ref 7–20)
BUN BLDV-MCNC: 18 MG/DL (ref 7–20)
BUN BLDV-MCNC: 22 MG/DL (ref 7–20)
BUN BLDV-MCNC: 9 MG/DL (ref 7–20)
BUN BLDV-MCNC: 9 MG/DL (ref 7–20)
CALCIUM SERPL-MCNC: 8.9 MG/DL (ref 8.3–10.6)
CALCIUM SERPL-MCNC: 9 MG/DL (ref 8.3–10.6)
CALCIUM SERPL-MCNC: 9 MG/DL (ref 8.3–10.6)
CALCIUM SERPL-MCNC: 9.1 MG/DL (ref 8.3–10.6)
CALCIUM SERPL-MCNC: 9.1 MG/DL (ref 8.3–10.6)
CALCIUM SERPL-MCNC: 9.3 MG/DL (ref 8.3–10.6)
CALCIUM SERPL-MCNC: 9.3 MG/DL (ref 8.3–10.6)
CALCIUM SERPL-MCNC: 9.4 MG/DL (ref 8.3–10.6)
CALCIUM SERPL-MCNC: 9.7 MG/DL (ref 8.3–10.6)
CALCIUM SERPL-MCNC: 9.8 MG/DL (ref 8.3–10.6)
CASTS: ABNORMAL /LPF
CHLORIDE BLD-SCNC: 101 MMOL/L (ref 99–110)
CHLORIDE BLD-SCNC: 102 MMOL/L (ref 99–110)
CHLORIDE BLD-SCNC: 103 MMOL/L (ref 99–110)
CHLORIDE BLD-SCNC: 103 MMOL/L (ref 99–110)
CHLORIDE BLD-SCNC: 104 MMOL/L (ref 99–110)
CHLORIDE BLD-SCNC: 104 MMOL/L (ref 99–110)
CHLORIDE BLD-SCNC: 105 MMOL/L (ref 99–110)
CHLORIDE BLD-SCNC: 107 MMOL/L (ref 99–110)
CHLORIDE BLD-SCNC: 107 MMOL/L (ref 99–110)
CHLORIDE BLD-SCNC: 96 MMOL/L (ref 99–110)
CHOLESTEROL, TOTAL: 153 MG/DL (ref 0–199)
CLARITY: CLEAR
CO2: 19 MMOL/L (ref 21–32)
CO2: 21 MMOL/L (ref 21–32)
CO2: 22 MMOL/L (ref 21–32)
CO2: 24 MMOL/L (ref 21–32)
CO2: 25 MMOL/L (ref 21–32)
COLOR: YELLOW
COMMENT UA: ABNORMAL
CREAT SERPL-MCNC: 0.9 MG/DL (ref 0.8–1.3)
CREAT SERPL-MCNC: 0.9 MG/DL (ref 0.8–1.3)
CREAT SERPL-MCNC: 1 MG/DL (ref 0.8–1.3)
CREAT SERPL-MCNC: 1.1 MG/DL (ref 0.8–1.3)
CREAT SERPL-MCNC: 1.2 MG/DL (ref 0.8–1.3)
CREAT SERPL-MCNC: 1.2 MG/DL (ref 0.8–1.3)
EKG ATRIAL RATE: 57 BPM
EKG ATRIAL RATE: 87 BPM
EKG ATRIAL RATE: 91 BPM
EKG DIAGNOSIS: NORMAL
EKG P AXIS: 19 DEGREES
EKG P AXIS: 59 DEGREES
EKG P AXIS: 71 DEGREES
EKG P-R INTERVAL: 166 MS
EKG P-R INTERVAL: 168 MS
EKG P-R INTERVAL: 196 MS
EKG Q-T INTERVAL: 390 MS
EKG Q-T INTERVAL: 446 MS
EKG Q-T INTERVAL: 478 MS
EKG QRS DURATION: 126 MS
EKG QRS DURATION: 128 MS
EKG QRS DURATION: 94 MS
EKG QTC CALCULATION (BAZETT): 465 MS
EKG QTC CALCULATION (BAZETT): 479 MS
EKG QTC CALCULATION (BAZETT): 536 MS
EKG R AXIS: -23 DEGREES
EKG R AXIS: -33 DEGREES
EKG R AXIS: -44 DEGREES
EKG T AXIS: 114 DEGREES
EKG T AXIS: 130 DEGREES
EKG T AXIS: 136 DEGREES
EKG VENTRICULAR RATE: 57 BPM
EKG VENTRICULAR RATE: 87 BPM
EKG VENTRICULAR RATE: 91 BPM
EOSINOPHILS ABSOLUTE: 0 K/UL (ref 0–0.6)
EOSINOPHILS ABSOLUTE: 0 K/UL (ref 0–0.6)
EOSINOPHILS ABSOLUTE: 0.1 K/UL (ref 0–0.6)
EOSINOPHILS ABSOLUTE: 0.1 K/UL (ref 0–0.6)
EOSINOPHILS ABSOLUTE: 0.2 K/UL (ref 0–0.6)
EOSINOPHILS RELATIVE PERCENT: 0 %
EOSINOPHILS RELATIVE PERCENT: 0.3 %
EOSINOPHILS RELATIVE PERCENT: 1.4 %
EOSINOPHILS RELATIVE PERCENT: 2 %
EOSINOPHILS RELATIVE PERCENT: 3.1 %
EOSINOPHILS RELATIVE PERCENT: 3.2 %
EOSINOPHILS RELATIVE PERCENT: 3.2 %
ESTIMATED AVERAGE GLUCOSE: 116.9 MG/DL
FLUID TYPE: NORMAL
GFR AFRICAN AMERICAN: >60
GFR NON-AFRICAN AMERICAN: >60
GLOBULIN: 2.4 G/DL
GLOBULIN: 2.6 G/DL
GLOBULIN: 2.7 G/DL
GLOBULIN: 2.8 G/DL
GLOBULIN: 3.1 G/DL
GLUCOSE BLD-MCNC: 102 MG/DL (ref 70–99)
GLUCOSE BLD-MCNC: 104 MG/DL (ref 70–99)
GLUCOSE BLD-MCNC: 113 MG/DL (ref 70–99)
GLUCOSE BLD-MCNC: 124 MG/DL (ref 70–99)
GLUCOSE BLD-MCNC: 143 MG/DL (ref 70–99)
GLUCOSE BLD-MCNC: 156 MG/DL (ref 70–99)
GLUCOSE BLD-MCNC: 186 MG/DL (ref 70–99)
GLUCOSE BLD-MCNC: 83 MG/DL (ref 70–99)
GLUCOSE BLD-MCNC: 83 MG/DL (ref 70–99)
GLUCOSE BLD-MCNC: 89 MG/DL (ref 70–99)
GLUCOSE BLD-MCNC: 93 MG/DL (ref 70–99)
GLUCOSE BLD-MCNC: 94 MG/DL (ref 70–99)
GLUCOSE BLD-MCNC: 96 MG/DL (ref 70–99)
GLUCOSE BLD-MCNC: 99 MG/DL (ref 70–99)
GLUCOSE URINE: NEGATIVE MG/DL
HBA1C MFR BLD: 5.7 %
HCT VFR BLD CALC: 36.6 % (ref 40.5–52.5)
HCT VFR BLD CALC: 37.3 % (ref 40.5–52.5)
HCT VFR BLD CALC: 37.6 % (ref 40.5–52.5)
HCT VFR BLD CALC: 38 % (ref 40.5–52.5)
HCT VFR BLD CALC: 38.9 % (ref 40.5–52.5)
HCT VFR BLD CALC: 39.1 % (ref 40.5–52.5)
HCT VFR BLD CALC: 39.7 % (ref 40.5–52.5)
HCT VFR BLD CALC: 41.5 % (ref 40.5–52.5)
HCT VFR BLD CALC: 41.8 % (ref 40.5–52.5)
HCT VFR BLD CALC: 43.9 % (ref 40.5–52.5)
HDLC SERPL-MCNC: 28 MG/DL (ref 40–60)
HEMOGLOBIN: 12.4 G/DL (ref 13.5–17.5)
HEMOGLOBIN: 12.5 G/DL (ref 13.5–17.5)
HEMOGLOBIN: 12.7 G/DL (ref 13.5–17.5)
HEMOGLOBIN: 12.8 G/DL (ref 13.5–17.5)
HEMOGLOBIN: 13.1 G/DL (ref 13.5–17.5)
HEMOGLOBIN: 13.2 G/DL (ref 13.5–17.5)
HEMOGLOBIN: 13.3 G/DL (ref 13.5–17.5)
HEMOGLOBIN: 13.8 G/DL (ref 13.5–17.5)
HEMOGLOBIN: 14.2 G/DL (ref 13.5–17.5)
HEMOGLOBIN: 15 G/DL (ref 13.5–17.5)
KETONES, URINE: NEGATIVE MG/DL
LACTIC ACID: 1.1 MMOL/L (ref 0.4–2)
LDL CHOLESTEROL CALCULATED: 103 MG/DL
LEUKOCYTE ESTERASE, URINE: NEGATIVE
LIPASE: 21 U/L (ref 13–60)
LIPASE: 25 U/L (ref 13–60)
LV EF: 22 %
LV EF: 33 %
LVEF MODALITY: NORMAL
LVEF MODALITY: NORMAL
LYMPHOCYTES ABSOLUTE: 0.3 K/UL (ref 1–5.1)
LYMPHOCYTES ABSOLUTE: 0.4 K/UL (ref 1–5.1)
LYMPHOCYTES ABSOLUTE: 0.6 K/UL (ref 1–5.1)
LYMPHOCYTES ABSOLUTE: 0.6 K/UL (ref 1–5.1)
LYMPHOCYTES ABSOLUTE: 0.7 K/UL (ref 1–5.1)
LYMPHOCYTES RELATIVE PERCENT: 3.4 %
LYMPHOCYTES RELATIVE PERCENT: 5.1 %
LYMPHOCYTES RELATIVE PERCENT: 6 %
LYMPHOCYTES RELATIVE PERCENT: 7.2 %
LYMPHOCYTES RELATIVE PERCENT: 8.3 %
LYMPHOCYTES RELATIVE PERCENT: 8.5 %
LYMPHOCYTES RELATIVE PERCENT: 9.3 %
MCH RBC QN AUTO: 29.8 PG (ref 26–34)
MCH RBC QN AUTO: 30 PG (ref 26–34)
MCH RBC QN AUTO: 30.1 PG (ref 26–34)
MCH RBC QN AUTO: 30.1 PG (ref 26–34)
MCH RBC QN AUTO: 30.2 PG (ref 26–34)
MCH RBC QN AUTO: 30.4 PG (ref 26–34)
MCH RBC QN AUTO: 30.6 PG (ref 26–34)
MCH RBC QN AUTO: 31 PG (ref 26–34)
MCHC RBC AUTO-ENTMCNC: 33 G/DL (ref 31–36)
MCHC RBC AUTO-ENTMCNC: 33.5 G/DL (ref 31–36)
MCHC RBC AUTO-ENTMCNC: 33.6 G/DL (ref 31–36)
MCHC RBC AUTO-ENTMCNC: 33.9 G/DL (ref 31–36)
MCHC RBC AUTO-ENTMCNC: 34.1 G/DL (ref 31–36)
MCHC RBC AUTO-ENTMCNC: 34.1 G/DL (ref 31–36)
MCV RBC AUTO: 88.4 FL (ref 80–100)
MCV RBC AUTO: 88.5 FL (ref 80–100)
MCV RBC AUTO: 89.2 FL (ref 80–100)
MCV RBC AUTO: 89.5 FL (ref 80–100)
MCV RBC AUTO: 89.9 FL (ref 80–100)
MCV RBC AUTO: 90.1 FL (ref 80–100)
MCV RBC AUTO: 90.1 FL (ref 80–100)
MCV RBC AUTO: 90.2 FL (ref 80–100)
MCV RBC AUTO: 90.8 FL (ref 80–100)
MCV RBC AUTO: 91.4 FL (ref 80–100)
MICROSCOPIC EXAMINATION: YES
MONOCYTES ABSOLUTE: 0.4 K/UL (ref 0–1.3)
MONOCYTES ABSOLUTE: 0.5 K/UL (ref 0–1.3)
MONOCYTES ABSOLUTE: 0.5 K/UL (ref 0–1.3)
MONOCYTES ABSOLUTE: 0.6 K/UL (ref 0–1.3)
MONOCYTES ABSOLUTE: 0.6 K/UL (ref 0–1.3)
MONOCYTES ABSOLUTE: 0.7 K/UL (ref 0–1.3)
MONOCYTES ABSOLUTE: 0.8 K/UL (ref 0–1.3)
MONOCYTES RELATIVE PERCENT: 10.7 %
MONOCYTES RELATIVE PERCENT: 4.3 %
MONOCYTES RELATIVE PERCENT: 5.1 %
MONOCYTES RELATIVE PERCENT: 7.2 %
MONOCYTES RELATIVE PERCENT: 9.5 %
MONOCYTES RELATIVE PERCENT: 9.6 %
MONOCYTES RELATIVE PERCENT: 9.8 %
NEUTROPHILS ABSOLUTE: 4.6 K/UL (ref 1.7–7.7)
NEUTROPHILS ABSOLUTE: 5.2 K/UL (ref 1.7–7.7)
NEUTROPHILS ABSOLUTE: 5.4 K/UL (ref 1.7–7.7)
NEUTROPHILS ABSOLUTE: 6.1 K/UL (ref 1.7–7.7)
NEUTROPHILS ABSOLUTE: 6.5 K/UL (ref 1.7–7.7)
NEUTROPHILS ABSOLUTE: 7.5 K/UL (ref 1.7–7.7)
NEUTROPHILS ABSOLUTE: 8.2 K/UL (ref 1.7–7.7)
NEUTROPHILS RELATIVE PERCENT: 78.3 %
NEUTROPHILS RELATIVE PERCENT: 78.3 %
NEUTROPHILS RELATIVE PERCENT: 78.4 %
NEUTROPHILS RELATIVE PERCENT: 79.8 %
NEUTROPHILS RELATIVE PERCENT: 83 %
NEUTROPHILS RELATIVE PERCENT: 89.5 %
NEUTROPHILS RELATIVE PERCENT: 90.5 %
NITRITE, URINE: NEGATIVE
PDW BLD-RTO: 12.9 % (ref 12.4–15.4)
PDW BLD-RTO: 13 % (ref 12.4–15.4)
PDW BLD-RTO: 13.1 % (ref 12.4–15.4)
PDW BLD-RTO: 13.2 % (ref 12.4–15.4)
PDW BLD-RTO: 13.5 % (ref 12.4–15.4)
PDW BLD-RTO: 13.5 % (ref 12.4–15.4)
PDW BLD-RTO: 13.8 % (ref 12.4–15.4)
PDW BLD-RTO: 13.8 % (ref 12.4–15.4)
PDW BLD-RTO: 13.9 % (ref 12.4–15.4)
PDW BLD-RTO: 14.2 % (ref 12.4–15.4)
PERFORMED ON: ABNORMAL
PERFORMED ON: NORMAL
PH UA: 5.5 (ref 5–8)
PLATELET # BLD: 150 K/UL (ref 135–450)
PLATELET # BLD: 164 K/UL (ref 135–450)
PLATELET # BLD: 165 K/UL (ref 135–450)
PLATELET # BLD: 180 K/UL (ref 135–450)
PLATELET # BLD: 186 K/UL (ref 135–450)
PLATELET # BLD: 211 K/UL (ref 135–450)
PLATELET # BLD: 217 K/UL (ref 135–450)
PLATELET # BLD: 233 K/UL (ref 135–450)
PLATELET # BLD: 235 K/UL (ref 135–450)
PLATELET # BLD: 250 K/UL (ref 135–450)
PMV BLD AUTO: 7.8 FL (ref 5–10.5)
PMV BLD AUTO: 7.8 FL (ref 5–10.5)
PMV BLD AUTO: 8 FL (ref 5–10.5)
PMV BLD AUTO: 8.1 FL (ref 5–10.5)
PMV BLD AUTO: 8.6 FL (ref 5–10.5)
PMV BLD AUTO: 8.6 FL (ref 5–10.5)
PMV BLD AUTO: 8.7 FL (ref 5–10.5)
PMV BLD AUTO: 8.8 FL (ref 5–10.5)
PMV BLD AUTO: 8.9 FL (ref 5–10.5)
PMV BLD AUTO: 9.4 FL (ref 5–10.5)
POTASSIUM REFLEX MAGNESIUM: 3.6 MMOL/L (ref 3.5–5.1)
POTASSIUM REFLEX MAGNESIUM: 3.8 MMOL/L (ref 3.5–5.1)
POTASSIUM REFLEX MAGNESIUM: 3.9 MMOL/L (ref 3.5–5.1)
POTASSIUM REFLEX MAGNESIUM: 4 MMOL/L (ref 3.5–5.1)
POTASSIUM REFLEX MAGNESIUM: 4.2 MMOL/L (ref 3.5–5.1)
POTASSIUM REFLEX MAGNESIUM: 4.2 MMOL/L (ref 3.5–5.1)
POTASSIUM SERPL-SCNC: 3.1 MMOL/L (ref 3.5–5.1)
POTASSIUM SERPL-SCNC: 3.5 MMOL/L (ref 3.5–5.1)
POTASSIUM SERPL-SCNC: 3.7 MMOL/L (ref 3.5–5.1)
POTASSIUM SERPL-SCNC: 3.9 MMOL/L (ref 3.5–5.1)
POTASSIUM SERPL-SCNC: 3.9 MMOL/L (ref 3.5–5.1)
POTASSIUM SERPL-SCNC: 4.8 MMOL/L (ref 3.5–5.1)
PRO-BNP: 1446 PG/ML (ref 0–124)
PROCALCITONIN: 0.15 NG/ML (ref 0–0.15)
PROTEIN UA: NEGATIVE MG/DL
RBC # BLD: 4.14 M/UL (ref 4.2–5.9)
RBC # BLD: 4.14 M/UL (ref 4.2–5.9)
RBC # BLD: 4.21 M/UL (ref 4.2–5.9)
RBC # BLD: 4.22 M/UL (ref 4.2–5.9)
RBC # BLD: 4.31 M/UL (ref 4.2–5.9)
RBC # BLD: 4.39 M/UL (ref 4.2–5.9)
RBC # BLD: 4.41 M/UL (ref 4.2–5.9)
RBC # BLD: 4.57 M/UL (ref 4.2–5.9)
RBC # BLD: 4.57 M/UL (ref 4.2–5.9)
RBC # BLD: 4.96 M/UL (ref 4.2–5.9)
RBC UA: ABNORMAL /HPF (ref 0–2)
SODIUM BLD-SCNC: 132 MMOL/L (ref 136–145)
SODIUM BLD-SCNC: 137 MMOL/L (ref 136–145)
SODIUM BLD-SCNC: 138 MMOL/L (ref 136–145)
SODIUM BLD-SCNC: 138 MMOL/L (ref 136–145)
SODIUM BLD-SCNC: 139 MMOL/L (ref 136–145)
SODIUM BLD-SCNC: 140 MMOL/L (ref 136–145)
SODIUM BLD-SCNC: 140 MMOL/L (ref 136–145)
SODIUM BLD-SCNC: 141 MMOL/L (ref 136–145)
SODIUM BLD-SCNC: 143 MMOL/L (ref 136–145)
SPECIFIC GRAVITY UA: 1.02 (ref 1–1.03)
TOTAL PROTEIN: 5.6 G/DL (ref 6.4–8.2)
TOTAL PROTEIN: 5.7 G/DL (ref 6.4–8.2)
TOTAL PROTEIN: 5.8 G/DL (ref 6.4–8.2)
TOTAL PROTEIN: 5.9 G/DL (ref 6.4–8.2)
TOTAL PROTEIN: 6.2 G/DL (ref 6.4–8.2)
TOTAL PROTEIN: 6.3 G/DL (ref 6.4–8.2)
TOTAL PROTEIN: 7.1 G/DL (ref 6.4–8.2)
TRIGL SERPL-MCNC: 110 MG/DL (ref 0–150)
TROPONIN: 0.08 NG/ML
TROPONIN: 0.11 NG/ML
TROPONIN: 0.14 NG/ML
TROPONIN: <0.01 NG/ML
URINE REFLEX TO CULTURE: ABNORMAL
URINE TYPE: ABNORMAL
UROBILINOGEN, URINE: 0.2 E.U./DL
VLDLC SERPL CALC-MCNC: 22 MG/DL
WBC # BLD: 4.2 K/UL (ref 4–11)
WBC # BLD: 5.9 K/UL (ref 4–11)
WBC # BLD: 6.6 K/UL (ref 4–11)
WBC # BLD: 6.9 K/UL (ref 4–11)
WBC # BLD: 7 K/UL (ref 4–11)
WBC # BLD: 7.1 K/UL (ref 4–11)
WBC # BLD: 7.3 K/UL (ref 4–11)
WBC # BLD: 8.1 K/UL (ref 4–11)
WBC # BLD: 8.3 K/UL (ref 4–11)
WBC # BLD: 9.2 K/UL (ref 4–11)
WBC UA: ABNORMAL /HPF (ref 0–5)

## 2019-01-01 PROCEDURE — 7100000000 HC PACU RECOVERY - FIRST 15 MIN: Performed by: INTERNAL MEDICINE

## 2019-01-01 PROCEDURE — C1769 GUIDE WIRE: HCPCS | Performed by: INTERNAL MEDICINE

## 2019-01-01 PROCEDURE — 80053 COMPREHEN METABOLIC PANEL: CPT

## 2019-01-01 PROCEDURE — 6360000002 HC RX W HCPCS: Performed by: HOSPITALIST

## 2019-01-01 PROCEDURE — C2625 STENT, NON-COR, TEM W/DEL SY: HCPCS | Performed by: INTERNAL MEDICINE

## 2019-01-01 PROCEDURE — 99223 1ST HOSP IP/OBS HIGH 75: CPT | Performed by: SURGERY

## 2019-01-01 PROCEDURE — 3017F COLORECTAL CA SCREEN DOC REV: CPT | Performed by: INTERNAL MEDICINE

## 2019-01-01 PROCEDURE — 96361 HYDRATE IV INFUSION ADD-ON: CPT

## 2019-01-01 PROCEDURE — 1036F TOBACCO NON-USER: CPT | Performed by: INTERNAL MEDICINE

## 2019-01-01 PROCEDURE — 74328 X-RAY BILE DUCT ENDOSCOPY: CPT

## 2019-01-01 PROCEDURE — APPNB30 APP NON BILLABLE TIME 0-30 MINS: Performed by: NURSE PRACTITIONER

## 2019-01-01 PROCEDURE — 94760 N-INVAS EAR/PLS OXIMETRY 1: CPT

## 2019-01-01 PROCEDURE — 3700000000 HC ANESTHESIA ATTENDED CARE: Performed by: INTERNAL MEDICINE

## 2019-01-01 PROCEDURE — 2500000003 HC RX 250 WO HCPCS: Performed by: NURSE ANESTHETIST, CERTIFIED REGISTERED

## 2019-01-01 PROCEDURE — A9502 TC99M TETROFOSMIN: HCPCS | Performed by: INTERNAL MEDICINE

## 2019-01-01 PROCEDURE — G8427 DOCREV CUR MEDS BY ELIG CLIN: HCPCS | Performed by: INTERNAL MEDICINE

## 2019-01-01 PROCEDURE — 2580000003 HC RX 258: Performed by: EMERGENCY MEDICINE

## 2019-01-01 PROCEDURE — 1123F ACP DISCUSS/DSCN MKR DOCD: CPT | Performed by: INTERNAL MEDICINE

## 2019-01-01 PROCEDURE — G8598 ASA/ANTIPLAT THER USED: HCPCS | Performed by: FAMILY MEDICINE

## 2019-01-01 PROCEDURE — 6360000002 HC RX W HCPCS: Performed by: NURSE PRACTITIONER

## 2019-01-01 PROCEDURE — 83605 ASSAY OF LACTIC ACID: CPT

## 2019-01-01 PROCEDURE — 2580000003 HC RX 258: Performed by: SURGERY

## 2019-01-01 PROCEDURE — APPNB30 APP NON BILLABLE TIME 0-30 MINS: Performed by: PHYSICIAN ASSISTANT

## 2019-01-01 PROCEDURE — 3600000004 HC SURGERY LEVEL 4 BASE: Performed by: SURGERY

## 2019-01-01 PROCEDURE — 93005 ELECTROCARDIOGRAM TRACING: CPT | Performed by: NURSE PRACTITIONER

## 2019-01-01 PROCEDURE — 6360000002 HC RX W HCPCS: Performed by: INTERNAL MEDICINE

## 2019-01-01 PROCEDURE — 6360000002 HC RX W HCPCS: Performed by: SURGERY

## 2019-01-01 PROCEDURE — 36415 COLL VENOUS BLD VENIPUNCTURE: CPT | Performed by: FAMILY MEDICINE

## 2019-01-01 PROCEDURE — 85027 COMPLETE CBC AUTOMATED: CPT

## 2019-01-01 PROCEDURE — 1200000000 HC SEMI PRIVATE

## 2019-01-01 PROCEDURE — 2500000003 HC RX 250 WO HCPCS: Performed by: INTERNAL MEDICINE

## 2019-01-01 PROCEDURE — G8598 ASA/ANTIPLAT THER USED: HCPCS | Performed by: INTERNAL MEDICINE

## 2019-01-01 PROCEDURE — 2580000003 HC RX 258: Performed by: INTERNAL MEDICINE

## 2019-01-01 PROCEDURE — 1036F TOBACCO NON-USER: CPT | Performed by: FAMILY MEDICINE

## 2019-01-01 PROCEDURE — 99214 OFFICE O/P EST MOD 30 MIN: CPT | Performed by: FAMILY MEDICINE

## 2019-01-01 PROCEDURE — 36415 COLL VENOUS BLD VENIPUNCTURE: CPT

## 2019-01-01 PROCEDURE — 93296 REM INTERROG EVL PM/IDS: CPT | Performed by: INTERNAL MEDICINE

## 2019-01-01 PROCEDURE — 3017F COLORECTAL CA SCREEN DOC REV: CPT | Performed by: FAMILY MEDICINE

## 2019-01-01 PROCEDURE — 94150 VITAL CAPACITY TEST: CPT

## 2019-01-01 PROCEDURE — 6360000002 HC RX W HCPCS: Performed by: ANESTHESIOLOGY

## 2019-01-01 PROCEDURE — 99024 POSTOP FOLLOW-UP VISIT: CPT | Performed by: SURGERY

## 2019-01-01 PROCEDURE — 6370000000 HC RX 637 (ALT 250 FOR IP): Performed by: SURGERY

## 2019-01-01 PROCEDURE — 7100000010 HC PHASE II RECOVERY - FIRST 15 MIN: Performed by: INTERNAL MEDICINE

## 2019-01-01 PROCEDURE — 80048 BASIC METABOLIC PNL TOTAL CA: CPT

## 2019-01-01 PROCEDURE — 97530 THERAPEUTIC ACTIVITIES: CPT

## 2019-01-01 PROCEDURE — 2580000003 HC RX 258: Performed by: ANESTHESIOLOGY

## 2019-01-01 PROCEDURE — 85025 COMPLETE CBC W/AUTO DIFF WBC: CPT

## 2019-01-01 PROCEDURE — 84145 PROCALCITONIN (PCT): CPT

## 2019-01-01 PROCEDURE — 2580000003 HC RX 258: Performed by: HOSPITALIST

## 2019-01-01 PROCEDURE — 96375 TX/PRO/DX INJ NEW DRUG ADDON: CPT

## 2019-01-01 PROCEDURE — 6370000000 HC RX 637 (ALT 250 FOR IP): Performed by: HOSPITALIST

## 2019-01-01 PROCEDURE — 3700000001 HC ADD 15 MINUTES (ANESTHESIA): Performed by: INTERNAL MEDICINE

## 2019-01-01 PROCEDURE — 4040F PNEUMOC VAC/ADMIN/RCVD: CPT | Performed by: FAMILY MEDICINE

## 2019-01-01 PROCEDURE — 1111F DSCHRG MED/CURRENT MED MERGE: CPT | Performed by: FAMILY MEDICINE

## 2019-01-01 PROCEDURE — 83690 ASSAY OF LIPASE: CPT

## 2019-01-01 PROCEDURE — 99214 OFFICE O/P EST MOD 30 MIN: CPT | Performed by: INTERNAL MEDICINE

## 2019-01-01 PROCEDURE — 1123F ACP DISCUSS/DSCN MKR DOCD: CPT | Performed by: FAMILY MEDICINE

## 2019-01-01 PROCEDURE — G8417 CALC BMI ABV UP PARAM F/U: HCPCS | Performed by: INTERNAL MEDICINE

## 2019-01-01 PROCEDURE — 6360000002 HC RX W HCPCS: Performed by: NURSE ANESTHETIST, CERTIFIED REGISTERED

## 2019-01-01 PROCEDURE — 2500000003 HC RX 250 WO HCPCS: Performed by: SURGERY

## 2019-01-01 PROCEDURE — 2580000003 HC RX 258: Performed by: NURSE PRACTITIONER

## 2019-01-01 PROCEDURE — 83880 ASSAY OF NATRIURETIC PEPTIDE: CPT

## 2019-01-01 PROCEDURE — 90653 IIV ADJUVANT VACCINE IM: CPT | Performed by: FAMILY MEDICINE

## 2019-01-01 PROCEDURE — 3609014900 HC ERCP W/SPHINCTEROTOMY &/OR PAPILLOTOMY: Performed by: INTERNAL MEDICINE

## 2019-01-01 PROCEDURE — 78226 HEPATOBILIARY SYSTEM IMAGING: CPT

## 2019-01-01 PROCEDURE — G8427 DOCREV CUR MEDS BY ELIG CLIN: HCPCS | Performed by: FAMILY MEDICINE

## 2019-01-01 PROCEDURE — 7100000001 HC PACU RECOVERY - ADDTL 15 MIN: Performed by: SURGERY

## 2019-01-01 PROCEDURE — A9537 TC99M MEBROFENIN: HCPCS | Performed by: SURGERY

## 2019-01-01 PROCEDURE — 2700000000 HC OXYGEN THERAPY PER DAY

## 2019-01-01 PROCEDURE — 93010 ELECTROCARDIOGRAM REPORT: CPT | Performed by: INTERNAL MEDICINE

## 2019-01-01 PROCEDURE — 99232 SBSQ HOSP IP/OBS MODERATE 35: CPT | Performed by: SURGERY

## 2019-01-01 PROCEDURE — APPSS30 APP SPLIT SHARED TIME 16-30 MINUTES: Performed by: NURSE PRACTITIONER

## 2019-01-01 PROCEDURE — 93000 ELECTROCARDIOGRAM COMPLETE: CPT | Performed by: INTERNAL MEDICINE

## 2019-01-01 PROCEDURE — 71045 X-RAY EXAM CHEST 1 VIEW: CPT

## 2019-01-01 PROCEDURE — APPSS30 APP SPLIT SHARED TIME 16-30 MINUTES: Performed by: PHYSICIAN ASSISTANT

## 2019-01-01 PROCEDURE — 0FT44ZZ RESECTION OF GALLBLADDER, PERCUTANEOUS ENDOSCOPIC APPROACH: ICD-10-PCS | Performed by: SURGERY

## 2019-01-01 PROCEDURE — 6370000000 HC RX 637 (ALT 250 FOR IP): Performed by: NURSE PRACTITIONER

## 2019-01-01 PROCEDURE — 6370000000 HC RX 637 (ALT 250 FOR IP): Performed by: INTERNAL MEDICINE

## 2019-01-01 PROCEDURE — 93306 TTE W/DOPPLER COMPLETE: CPT

## 2019-01-01 PROCEDURE — 99285 EMERGENCY DEPT VISIT HI MDM: CPT

## 2019-01-01 PROCEDURE — 3700000001 HC ADD 15 MINUTES (ANESTHESIA): Performed by: SURGERY

## 2019-01-01 PROCEDURE — 1111F DSCHRG MED/CURRENT MED MERGE: CPT | Performed by: INTERNAL MEDICINE

## 2019-01-01 PROCEDURE — 84484 ASSAY OF TROPONIN QUANT: CPT

## 2019-01-01 PROCEDURE — 0F758DZ DILATION OF RIGHT HEPATIC DUCT WITH INTRALUMINAL DEVICE, VIA NATURAL OR ARTIFICIAL OPENING ENDOSCOPIC: ICD-10-PCS | Performed by: INTERNAL MEDICINE

## 2019-01-01 PROCEDURE — 97535 SELF CARE MNGMENT TRAINING: CPT

## 2019-01-01 PROCEDURE — 2720000010 HC SURG SUPPLY STERILE: Performed by: INTERNAL MEDICINE

## 2019-01-01 PROCEDURE — 2709999900 HC NON-CHARGEABLE SUPPLY: Performed by: SURGERY

## 2019-01-01 PROCEDURE — 93297 REM INTERROG DEV EVAL ICPMS: CPT | Performed by: INTERNAL MEDICINE

## 2019-01-01 PROCEDURE — 74177 CT ABD & PELVIS W/CONTRAST: CPT

## 2019-01-01 PROCEDURE — 3600000014 HC SURGERY LEVEL 4 ADDTL 15MIN: Performed by: SURGERY

## 2019-01-01 PROCEDURE — 93295 DEV INTERROG REMOTE 1/2/MLT: CPT | Performed by: INTERNAL MEDICINE

## 2019-01-01 PROCEDURE — 6360000002 HC RX W HCPCS: Performed by: EMERGENCY MEDICINE

## 2019-01-01 PROCEDURE — G0009 ADMIN PNEUMOCOCCAL VACCINE: HCPCS | Performed by: FAMILY MEDICINE

## 2019-01-01 PROCEDURE — 96374 THER/PROPH/DIAG INJ IV PUSH: CPT

## 2019-01-01 PROCEDURE — 3609015100 HC ERCP STENT PLACEMENT BILIARY/PANCREATIC DUCT: Performed by: INTERNAL MEDICINE

## 2019-01-01 PROCEDURE — 74176 CT ABD & PELVIS W/O CONTRAST: CPT

## 2019-01-01 PROCEDURE — 3609015200 HC ERCP REMOVE CALCULI/DEBRIS BILIARY/PANCREAS DUCT: Performed by: INTERNAL MEDICINE

## 2019-01-01 PROCEDURE — 99223 1ST HOSP IP/OBS HIGH 75: CPT | Performed by: INTERNAL MEDICINE

## 2019-01-01 PROCEDURE — 3430000000 HC RX DIAGNOSTIC RADIOPHARMACEUTICAL: Performed by: SURGERY

## 2019-01-01 PROCEDURE — 7100000000 HC PACU RECOVERY - FIRST 15 MIN: Performed by: SURGERY

## 2019-01-01 PROCEDURE — 99232 SBSQ HOSP IP/OBS MODERATE 35: CPT | Performed by: NURSE PRACTITIONER

## 2019-01-01 PROCEDURE — BF101ZZ FLUOROSCOPY OF BILE DUCTS USING LOW OSMOLAR CONTRAST: ICD-10-PCS | Performed by: INTERNAL MEDICINE

## 2019-01-01 PROCEDURE — 2709999900 HC NON-CHARGEABLE SUPPLY: Performed by: INTERNAL MEDICINE

## 2019-01-01 PROCEDURE — 97166 OT EVAL MOD COMPLEX 45 MIN: CPT

## 2019-01-01 PROCEDURE — 97116 GAIT TRAINING THERAPY: CPT

## 2019-01-01 PROCEDURE — 80076 HEPATIC FUNCTION PANEL: CPT

## 2019-01-01 PROCEDURE — G8417 CALC BMI ABV UP PARAM F/U: HCPCS | Performed by: FAMILY MEDICINE

## 2019-01-01 PROCEDURE — 81001 URINALYSIS AUTO W/SCOPE: CPT

## 2019-01-01 PROCEDURE — 96365 THER/PROPH/DIAG IV INF INIT: CPT

## 2019-01-01 PROCEDURE — 3700000000 HC ANESTHESIA ATTENDED CARE: Performed by: SURGERY

## 2019-01-01 PROCEDURE — 7100000001 HC PACU RECOVERY - ADDTL 15 MIN: Performed by: INTERNAL MEDICINE

## 2019-01-01 PROCEDURE — 90732 PPSV23 VACC 2 YRS+ SUBQ/IM: CPT | Performed by: FAMILY MEDICINE

## 2019-01-01 PROCEDURE — 99213 OFFICE O/P EST LOW 20 MIN: CPT | Performed by: FAMILY MEDICINE

## 2019-01-01 PROCEDURE — 99233 SBSQ HOSP IP/OBS HIGH 50: CPT | Performed by: INTERNAL MEDICINE

## 2019-01-01 PROCEDURE — 7100000011 HC PHASE II RECOVERY - ADDTL 15 MIN: Performed by: INTERNAL MEDICINE

## 2019-01-01 PROCEDURE — 3430000000 HC RX DIAGNOSTIC RADIOPHARMACEUTICAL: Performed by: INTERNAL MEDICINE

## 2019-01-01 PROCEDURE — 97161 PT EVAL LOW COMPLEX 20 MIN: CPT | Performed by: PHYSICAL THERAPIST

## 2019-01-01 PROCEDURE — 4040F PNEUMOC VAC/ADMIN/RCVD: CPT | Performed by: INTERNAL MEDICINE

## 2019-01-01 PROCEDURE — 47562 LAPAROSCOPIC CHOLECYSTECTOMY: CPT | Performed by: SURGERY

## 2019-01-01 PROCEDURE — G8482 FLU IMMUNIZE ORDER/ADMIN: HCPCS | Performed by: FAMILY MEDICINE

## 2019-01-01 PROCEDURE — 82247 BILIRUBIN TOTAL: CPT

## 2019-01-01 PROCEDURE — G0008 ADMIN INFLUENZA VIRUS VAC: HCPCS | Performed by: FAMILY MEDICINE

## 2019-01-01 PROCEDURE — 78452 HT MUSCLE IMAGE SPECT MULT: CPT

## 2019-01-01 PROCEDURE — 97162 PT EVAL MOD COMPLEX 30 MIN: CPT

## 2019-01-01 PROCEDURE — 83036 HEMOGLOBIN GLYCOSYLATED A1C: CPT

## 2019-01-01 PROCEDURE — 6360000004 HC RX CONTRAST MEDICATION: Performed by: INTERNAL MEDICINE

## 2019-01-01 PROCEDURE — 88304 TISSUE EXAM BY PATHOLOGIST: CPT

## 2019-01-01 PROCEDURE — 93005 ELECTROCARDIOGRAM TRACING: CPT | Performed by: EMERGENCY MEDICINE

## 2019-01-01 PROCEDURE — 2580000003 HC RX 258: Performed by: NURSE ANESTHETIST, CERTIFIED REGISTERED

## 2019-01-01 PROCEDURE — 93017 CV STRESS TEST TRACING ONLY: CPT

## 2019-01-01 PROCEDURE — 6360000004 HC RX CONTRAST MEDICATION: Performed by: NURSE PRACTITIONER

## 2019-01-01 PROCEDURE — 3609015000 HC ERCP REMOVE FOREIGN BODY/STENT BILIARY/PANC DUCT: Performed by: INTERNAL MEDICINE

## 2019-01-01 DEVICE — BILIARY STENT WITH NAVIFLEXTM RX DELIVERY SYSTEM
Type: IMPLANTABLE DEVICE | Site: BILE DUCT | Status: FUNCTIONAL
Brand: ADVANIX™ BILIARY

## 2019-01-01 RX ORDER — ONDANSETRON 2 MG/ML
4 INJECTION INTRAMUSCULAR; INTRAVENOUS
Status: COMPLETED | OUTPATIENT
Start: 2019-01-01 | End: 2019-01-01

## 2019-01-01 RX ORDER — FENTANYL CITRATE 50 UG/ML
25 INJECTION, SOLUTION INTRAMUSCULAR; INTRAVENOUS EVERY 5 MIN PRN
Status: DISCONTINUED | OUTPATIENT
Start: 2019-01-01 | End: 2019-01-01 | Stop reason: HOSPADM

## 2019-01-01 RX ORDER — HYDROCODONE BITARTRATE AND ACETAMINOPHEN 5; 325 MG/1; MG/1
2 TABLET ORAL EVERY 4 HOURS PRN
Status: DISCONTINUED | OUTPATIENT
Start: 2019-01-01 | End: 2019-01-01 | Stop reason: HOSPADM

## 2019-01-01 RX ORDER — ONDANSETRON 2 MG/ML
INJECTION INTRAMUSCULAR; INTRAVENOUS PRN
Status: DISCONTINUED | OUTPATIENT
Start: 2019-01-01 | End: 2019-01-01 | Stop reason: SDUPTHER

## 2019-01-01 RX ORDER — ACETAMINOPHEN 325 MG/1
650 TABLET ORAL EVERY 4 HOURS PRN
Status: DISCONTINUED | OUTPATIENT
Start: 2019-01-01 | End: 2019-01-01 | Stop reason: HOSPADM

## 2019-01-01 RX ORDER — FENTANYL CITRATE 50 UG/ML
25 INJECTION, SOLUTION INTRAMUSCULAR; INTRAVENOUS ONCE
Status: COMPLETED | OUTPATIENT
Start: 2019-01-01 | End: 2019-01-01

## 2019-01-01 RX ORDER — SODIUM CHLORIDE 9 MG/ML
INJECTION, SOLUTION INTRAVENOUS CONTINUOUS PRN
Status: DISCONTINUED | OUTPATIENT
Start: 2019-01-01 | End: 2019-01-01 | Stop reason: SDUPTHER

## 2019-01-01 RX ORDER — MORPHINE SULFATE 2 MG/ML
2 INJECTION, SOLUTION INTRAMUSCULAR; INTRAVENOUS EVERY 4 HOURS PRN
Status: DISCONTINUED | OUTPATIENT
Start: 2019-01-01 | End: 2019-01-01 | Stop reason: HOSPADM

## 2019-01-01 RX ORDER — ONDANSETRON 2 MG/ML
4 INJECTION INTRAMUSCULAR; INTRAVENOUS EVERY 6 HOURS PRN
Status: DISCONTINUED | OUTPATIENT
Start: 2019-01-01 | End: 2019-01-01 | Stop reason: HOSPADM

## 2019-01-01 RX ORDER — CEFDINIR 300 MG/1
300 CAPSULE ORAL 2 TIMES DAILY
Qty: 20 CAPSULE | Refills: 0 | Status: SHIPPED | OUTPATIENT
Start: 2019-01-01 | End: 2019-01-01

## 2019-01-01 RX ORDER — PROPOFOL 10 MG/ML
INJECTION, EMULSION INTRAVENOUS PRN
Status: DISCONTINUED | OUTPATIENT
Start: 2019-01-01 | End: 2019-01-01 | Stop reason: SDUPTHER

## 2019-01-01 RX ORDER — ATORVASTATIN CALCIUM 20 MG/1
20 TABLET, FILM COATED ORAL DAILY
Status: DISCONTINUED | OUTPATIENT
Start: 2019-01-01 | End: 2019-01-01 | Stop reason: HOSPADM

## 2019-01-01 RX ORDER — FUROSEMIDE 10 MG/ML
40 INJECTION INTRAMUSCULAR; INTRAVENOUS ONCE
Status: COMPLETED | OUTPATIENT
Start: 2019-01-01 | End: 2019-01-01

## 2019-01-01 RX ORDER — OXYCODONE HYDROCHLORIDE AND ACETAMINOPHEN 5; 325 MG/1; MG/1
2 TABLET ORAL PRN
Status: DISCONTINUED | OUTPATIENT
Start: 2019-01-01 | End: 2019-01-01 | Stop reason: HOSPADM

## 2019-01-01 RX ORDER — ROCURONIUM BROMIDE 10 MG/ML
INJECTION, SOLUTION INTRAVENOUS PRN
Status: DISCONTINUED | OUTPATIENT
Start: 2019-01-01 | End: 2019-01-01 | Stop reason: SDUPTHER

## 2019-01-01 RX ORDER — BUPIVACAINE HYDROCHLORIDE 5 MG/ML
INJECTION, SOLUTION EPIDURAL; INTRACAUDAL
Status: COMPLETED | OUTPATIENT
Start: 2019-01-01 | End: 2019-01-01

## 2019-01-01 RX ORDER — FENTANYL CITRATE 50 UG/ML
INJECTION, SOLUTION INTRAMUSCULAR; INTRAVENOUS PRN
Status: DISCONTINUED | OUTPATIENT
Start: 2019-01-01 | End: 2019-01-01 | Stop reason: SDUPTHER

## 2019-01-01 RX ORDER — ASPIRIN 81 MG/1
81 TABLET, CHEWABLE ORAL DAILY
Status: DISCONTINUED | OUTPATIENT
Start: 2019-01-01 | End: 2019-01-01 | Stop reason: HOSPADM

## 2019-01-01 RX ORDER — HYDROCODONE BITARTRATE AND ACETAMINOPHEN 5; 325 MG/1; MG/1
1 TABLET ORAL EVERY 6 HOURS PRN
Qty: 120 TABLET | Refills: 0 | Status: ON HOLD | OUTPATIENT
Start: 2019-01-01 | End: 2019-01-01 | Stop reason: HOSPADM

## 2019-01-01 RX ORDER — HYDROCODONE BITARTRATE AND ACETAMINOPHEN 5; 325 MG/1; MG/1
1 TABLET ORAL EVERY 6 HOURS PRN
Qty: 20 TABLET | Refills: 0 | Status: ON HOLD | OUTPATIENT
Start: 2019-01-01 | End: 2019-01-01 | Stop reason: HOSPADM

## 2019-01-01 RX ORDER — SACUBITRIL AND VALSARTAN 24; 26 MG/1; MG/1
TABLET, FILM COATED ORAL
Qty: 180 TABLET | Refills: 3 | Status: SHIPPED | OUTPATIENT
Start: 2019-01-01 | End: 2020-01-01

## 2019-01-01 RX ORDER — HYDRALAZINE HYDROCHLORIDE 20 MG/ML
5 INJECTION INTRAMUSCULAR; INTRAVENOUS
Status: DISCONTINUED | OUTPATIENT
Start: 2019-01-01 | End: 2019-01-01 | Stop reason: HOSPADM

## 2019-01-01 RX ORDER — ONDANSETRON 2 MG/ML
4 INJECTION INTRAMUSCULAR; INTRAVENOUS
Status: DISCONTINUED | OUTPATIENT
Start: 2019-01-01 | End: 2019-01-01 | Stop reason: HOSPADM

## 2019-01-01 RX ORDER — TRAMADOL HYDROCHLORIDE 50 MG/1
50 TABLET ORAL EVERY 6 HOURS PRN
Status: DISCONTINUED | OUTPATIENT
Start: 2019-01-01 | End: 2019-01-01 | Stop reason: HOSPADM

## 2019-01-01 RX ORDER — MORPHINE SULFATE 2 MG/ML
2 INJECTION, SOLUTION INTRAMUSCULAR; INTRAVENOUS EVERY 5 MIN PRN
Status: DISCONTINUED | OUTPATIENT
Start: 2019-01-01 | End: 2019-01-01 | Stop reason: HOSPADM

## 2019-01-01 RX ORDER — SODIUM CHLORIDE 9 MG/ML
INJECTION, SOLUTION INTRAVENOUS ONCE
Status: COMPLETED | OUTPATIENT
Start: 2019-01-01 | End: 2019-01-01

## 2019-01-01 RX ORDER — HYDROCODONE BITARTRATE AND ACETAMINOPHEN 5; 325 MG/1; MG/1
1 TABLET ORAL EVERY 4 HOURS PRN
Status: DISCONTINUED | OUTPATIENT
Start: 2019-01-01 | End: 2019-01-01 | Stop reason: HOSPADM

## 2019-01-01 RX ORDER — AMOXICILLIN AND CLAVULANATE POTASSIUM 875; 125 MG/1; MG/1
1 TABLET, FILM COATED ORAL 2 TIMES DAILY
Qty: 14 TABLET | Refills: 0 | Status: ON HOLD | OUTPATIENT
Start: 2019-01-01 | End: 2019-01-01 | Stop reason: HOSPADM

## 2019-01-01 RX ORDER — METRONIDAZOLE 500 MG/1
500 TABLET ORAL 3 TIMES DAILY
COMMUNITY
End: 2019-01-01

## 2019-01-01 RX ORDER — MORPHINE SULFATE 2 MG/ML
0.5 INJECTION, SOLUTION INTRAMUSCULAR; INTRAVENOUS EVERY 4 HOURS PRN
Status: DISCONTINUED | OUTPATIENT
Start: 2019-01-01 | End: 2019-01-01

## 2019-01-01 RX ORDER — ATORVASTATIN CALCIUM 20 MG/1
TABLET, FILM COATED ORAL
Qty: 90 TABLET | Refills: 1 | Status: ON HOLD | OUTPATIENT
Start: 2019-01-01 | End: 2020-01-01 | Stop reason: HOSPADM

## 2019-01-01 RX ORDER — ALBUTEROL SULFATE 2.5 MG/3ML
2.5 SOLUTION RESPIRATORY (INHALATION) EVERY 4 HOURS PRN
Status: DISPENSED | OUTPATIENT
Start: 2019-01-01 | End: 2019-01-01

## 2019-01-01 RX ORDER — OXYCODONE HYDROCHLORIDE AND ACETAMINOPHEN 5; 325 MG/1; MG/1
1 TABLET ORAL PRN
Status: DISCONTINUED | OUTPATIENT
Start: 2019-01-01 | End: 2019-01-01 | Stop reason: HOSPADM

## 2019-01-01 RX ORDER — HYDROCODONE BITARTRATE AND ACETAMINOPHEN 5; 325 MG/1; MG/1
1 TABLET ORAL EVERY 6 HOURS PRN
Qty: 120 TABLET | Refills: 0 | Status: SHIPPED | OUTPATIENT
Start: 2019-01-01 | End: 2020-01-01 | Stop reason: SDUPTHER

## 2019-01-01 RX ORDER — METOPROLOL TARTRATE 5 MG/5ML
INJECTION INTRAVENOUS PRN
Status: DISCONTINUED | OUTPATIENT
Start: 2019-01-01 | End: 2019-01-01 | Stop reason: SDUPTHER

## 2019-01-01 RX ORDER — SODIUM CHLORIDE 0.9 % (FLUSH) 0.9 %
10 SYRINGE (ML) INJECTION PRN
Status: DISCONTINUED | OUTPATIENT
Start: 2019-01-01 | End: 2019-01-01 | Stop reason: HOSPADM

## 2019-01-01 RX ORDER — SODIUM CHLORIDE, SODIUM LACTATE, POTASSIUM CHLORIDE, CALCIUM CHLORIDE 600; 310; 30; 20 MG/100ML; MG/100ML; MG/100ML; MG/100ML
INJECTION, SOLUTION INTRAVENOUS CONTINUOUS
Status: DISCONTINUED | OUTPATIENT
Start: 2019-01-01 | End: 2019-01-01

## 2019-01-01 RX ORDER — MAGNESIUM SULFATE 1 G/100ML
1 INJECTION INTRAVENOUS PRN
Status: DISCONTINUED | OUTPATIENT
Start: 2019-01-01 | End: 2019-01-01 | Stop reason: HOSPADM

## 2019-01-01 RX ORDER — KETAMINE HCL IN NACL, ISO-OSM 100MG/10ML
SYRINGE (ML) INJECTION PRN
Status: DISCONTINUED | OUTPATIENT
Start: 2019-01-01 | End: 2019-01-01 | Stop reason: SDUPTHER

## 2019-01-01 RX ORDER — GLYCOPYRROLATE 0.2 MG/ML
INJECTION INTRAMUSCULAR; INTRAVENOUS PRN
Status: DISCONTINUED | OUTPATIENT
Start: 2019-01-01 | End: 2019-01-01 | Stop reason: SDUPTHER

## 2019-01-01 RX ORDER — HYDROCODONE BITARTRATE AND ACETAMINOPHEN 5; 325 MG/1; MG/1
1 TABLET ORAL EVERY 6 HOURS PRN
Status: ON HOLD | COMMUNITY
End: 2019-01-01

## 2019-01-01 RX ORDER — SODIUM CHLORIDE 9 MG/ML
INJECTION, SOLUTION INTRAVENOUS CONTINUOUS
Status: DISCONTINUED | OUTPATIENT
Start: 2019-01-01 | End: 2019-01-01 | Stop reason: HOSPADM

## 2019-01-01 RX ORDER — SUCCINYLCHOLINE/SOD CL,ISO/PF 200MG/10ML
SYRINGE (ML) INTRAVENOUS PRN
Status: DISCONTINUED | OUTPATIENT
Start: 2019-01-01 | End: 2019-01-01 | Stop reason: SDUPTHER

## 2019-01-01 RX ORDER — SODIUM CHLORIDE 0.9 % (FLUSH) 0.9 %
10 SYRINGE (ML) INJECTION EVERY 12 HOURS SCHEDULED
Status: DISCONTINUED | OUTPATIENT
Start: 2019-01-01 | End: 2019-01-01 | Stop reason: HOSPADM

## 2019-01-01 RX ORDER — MIDAZOLAM HYDROCHLORIDE 1 MG/ML
INJECTION INTRAMUSCULAR; INTRAVENOUS PRN
Status: DISCONTINUED | OUTPATIENT
Start: 2019-01-01 | End: 2019-01-01 | Stop reason: SDUPTHER

## 2019-01-01 RX ORDER — MEPERIDINE HYDROCHLORIDE 25 MG/ML
12.5 INJECTION INTRAMUSCULAR; INTRAVENOUS; SUBCUTANEOUS EVERY 5 MIN PRN
Status: DISCONTINUED | OUTPATIENT
Start: 2019-01-01 | End: 2019-01-01 | Stop reason: HOSPADM

## 2019-01-01 RX ORDER — MORPHINE SULFATE 2 MG/ML
1 INJECTION, SOLUTION INTRAMUSCULAR; INTRAVENOUS EVERY 5 MIN PRN
Status: DISCONTINUED | OUTPATIENT
Start: 2019-01-01 | End: 2019-01-01 | Stop reason: HOSPADM

## 2019-01-01 RX ORDER — 0.9 % SODIUM CHLORIDE 0.9 %
500 INTRAVENOUS SOLUTION INTRAVENOUS ONCE
Status: COMPLETED | OUTPATIENT
Start: 2019-01-01 | End: 2019-01-01

## 2019-01-01 RX ORDER — GREEN TEA/HOODIA GORDONII 315-12.5MG
1 CAPSULE ORAL DAILY
Qty: 30 TABLET | Refills: 0 | Status: SHIPPED | OUTPATIENT
Start: 2019-01-01 | End: 2019-01-01

## 2019-01-01 RX ORDER — NEOSTIGMINE METHYLSULFATE 1 MG/ML
INJECTION, SOLUTION INTRAVENOUS PRN
Status: DISCONTINUED | OUTPATIENT
Start: 2019-01-01 | End: 2019-01-01 | Stop reason: SDUPTHER

## 2019-01-01 RX ORDER — POTASSIUM CHLORIDE 7.45 MG/ML
10 INJECTION INTRAVENOUS PRN
Status: DISCONTINUED | OUTPATIENT
Start: 2019-01-01 | End: 2019-01-01 | Stop reason: HOSPADM

## 2019-01-01 RX ORDER — MORPHINE SULFATE 2 MG/ML
1 INJECTION, SOLUTION INTRAMUSCULAR; INTRAVENOUS EVERY 4 HOURS PRN
Status: DISCONTINUED | OUTPATIENT
Start: 2019-01-01 | End: 2019-01-01

## 2019-01-01 RX ORDER — ONDANSETRON 2 MG/ML
4 INJECTION INTRAMUSCULAR; INTRAVENOUS ONCE
Status: COMPLETED | OUTPATIENT
Start: 2019-01-01 | End: 2019-01-01

## 2019-01-01 RX ORDER — CEFDINIR 300 MG/1
300 CAPSULE ORAL 2 TIMES DAILY
COMMUNITY
End: 2019-01-01

## 2019-01-01 RX ORDER — POTASSIUM CHLORIDE 20 MEQ/1
40 TABLET, EXTENDED RELEASE ORAL PRN
Status: DISCONTINUED | OUTPATIENT
Start: 2019-01-01 | End: 2019-01-01 | Stop reason: HOSPADM

## 2019-01-01 RX ORDER — CIPROFLOXACIN 2 MG/ML
400 INJECTION, SOLUTION INTRAVENOUS EVERY 12 HOURS
Status: DISCONTINUED | OUTPATIENT
Start: 2019-01-01 | End: 2019-01-01

## 2019-01-01 RX ORDER — LIDOCAINE HYDROCHLORIDE 20 MG/ML
INJECTION, SOLUTION EPIDURAL; INFILTRATION; INTRACAUDAL; PERINEURAL PRN
Status: DISCONTINUED | OUTPATIENT
Start: 2019-01-01 | End: 2019-01-01 | Stop reason: SDUPTHER

## 2019-01-01 RX ORDER — DEXAMETHASONE SODIUM PHOSPHATE 4 MG/ML
INJECTION, SOLUTION INTRA-ARTICULAR; INTRALESIONAL; INTRAMUSCULAR; INTRAVENOUS; SOFT TISSUE PRN
Status: DISCONTINUED | OUTPATIENT
Start: 2019-01-01 | End: 2019-01-01 | Stop reason: SDUPTHER

## 2019-01-01 RX ORDER — CARVEDILOL 6.25 MG/1
6.25 TABLET ORAL 2 TIMES DAILY WITH MEALS
Status: DISCONTINUED | OUTPATIENT
Start: 2019-01-01 | End: 2019-01-01 | Stop reason: HOSPADM

## 2019-01-01 RX ORDER — METRONIDAZOLE 500 MG/1
500 TABLET ORAL 3 TIMES DAILY
Qty: 30 TABLET | Refills: 0 | Status: SHIPPED | OUTPATIENT
Start: 2019-01-01 | End: 2019-01-01

## 2019-01-01 RX ORDER — SODIUM CHLORIDE 9 MG/ML
INJECTION, SOLUTION INTRAVENOUS CONTINUOUS
Status: DISCONTINUED | OUTPATIENT
Start: 2019-01-01 | End: 2019-01-01

## 2019-01-01 RX ORDER — ASPIRIN 81 MG/1
81 TABLET ORAL DAILY
Status: DISCONTINUED | OUTPATIENT
Start: 2019-01-01 | End: 2019-01-01 | Stop reason: HOSPADM

## 2019-01-01 RX ORDER — CARVEDILOL 6.25 MG/1
6.25 TABLET ORAL 2 TIMES DAILY WITH MEALS
Qty: 60 TABLET | Refills: 3 | Status: SHIPPED | OUTPATIENT
Start: 2019-01-01 | End: 2019-01-01 | Stop reason: ALTCHOICE

## 2019-01-01 RX ORDER — LABETALOL HYDROCHLORIDE 5 MG/ML
5 INJECTION, SOLUTION INTRAVENOUS EVERY 10 MIN PRN
Status: DISCONTINUED | OUTPATIENT
Start: 2019-01-01 | End: 2019-01-01 | Stop reason: HOSPADM

## 2019-01-01 RX ORDER — FUROSEMIDE 40 MG/1
40 TABLET ORAL DAILY
Status: DISCONTINUED | OUTPATIENT
Start: 2019-01-01 | End: 2019-01-01 | Stop reason: HOSPADM

## 2019-01-01 RX ORDER — MAGNESIUM HYDROXIDE 1200 MG/15ML
LIQUID ORAL CONTINUOUS PRN
Status: COMPLETED | OUTPATIENT
Start: 2019-01-01 | End: 2019-01-01

## 2019-01-01 RX ADMIN — ONDANSETRON 4 MG: 2 INJECTION INTRAMUSCULAR; INTRAVENOUS at 20:21

## 2019-01-01 RX ADMIN — FENTANYL CITRATE 25 MCG: 50 INJECTION INTRAMUSCULAR; INTRAVENOUS at 20:21

## 2019-01-01 RX ADMIN — CARVEDILOL 6.25 MG: 6.25 TABLET, FILM COATED ORAL at 08:07

## 2019-01-01 RX ADMIN — METRONIDAZOLE 500 MG: 500 INJECTION, SOLUTION INTRAVENOUS at 09:25

## 2019-01-01 RX ADMIN — GLYCOPYRROLATE 0.2 MG: 0.2 INJECTION, SOLUTION INTRAMUSCULAR; INTRAVENOUS at 15:44

## 2019-01-01 RX ADMIN — ONDANSETRON 4 MG: 2 INJECTION INTRAMUSCULAR; INTRAVENOUS at 15:23

## 2019-01-01 RX ADMIN — HYDROMORPHONE HYDROCHLORIDE 0.5 MG: 1 INJECTION, SOLUTION INTRAMUSCULAR; INTRAVENOUS; SUBCUTANEOUS at 16:24

## 2019-01-01 RX ADMIN — SODIUM CHLORIDE, PRESERVATIVE FREE 10 ML: 5 INJECTION INTRAVENOUS at 09:38

## 2019-01-01 RX ADMIN — ONDANSETRON 4 MG: 2 INJECTION INTRAMUSCULAR; INTRAVENOUS at 11:32

## 2019-01-01 RX ADMIN — TETROFOSMIN 10 MILLICURIE: 1.38 INJECTION, POWDER, LYOPHILIZED, FOR SOLUTION INTRAVENOUS at 10:30

## 2019-01-01 RX ADMIN — ENOXAPARIN SODIUM 40 MG: 40 INJECTION SUBCUTANEOUS at 08:26

## 2019-01-01 RX ADMIN — ASPIRIN 81 MG: 81 TABLET ORAL at 09:54

## 2019-01-01 RX ADMIN — HYDROCODONE BITARTRATE AND ACETAMINOPHEN 2 TABLET: 5; 325 TABLET ORAL at 13:47

## 2019-01-01 RX ADMIN — METRONIDAZOLE 500 MG: 500 INJECTION, SOLUTION INTRAVENOUS at 02:45

## 2019-01-01 RX ADMIN — PIPERACILLIN SODIUM,TAZOBACTAM SODIUM 3.38 G: 3; .375 INJECTION, POWDER, FOR SOLUTION INTRAVENOUS at 03:24

## 2019-01-01 RX ADMIN — TRAMADOL HYDROCHLORIDE 50 MG: 50 TABLET, FILM COATED ORAL at 13:25

## 2019-01-01 RX ADMIN — ONDANSETRON 4 MG: 2 INJECTION INTRAMUSCULAR; INTRAVENOUS at 17:30

## 2019-01-01 RX ADMIN — HYDROMORPHONE HYDROCHLORIDE 1 MG: 1 INJECTION, SOLUTION INTRAMUSCULAR; INTRAVENOUS; SUBCUTANEOUS at 02:59

## 2019-01-01 RX ADMIN — HYDROCODONE BITARTRATE AND ACETAMINOPHEN 2 TABLET: 5; 325 TABLET ORAL at 19:01

## 2019-01-01 RX ADMIN — SACUBITRIL AND VALSARTAN 1 TABLET: 24; 26 TABLET, FILM COATED ORAL at 20:31

## 2019-01-01 RX ADMIN — MORPHINE SULFATE 2 MG: 2 INJECTION, SOLUTION INTRAMUSCULAR; INTRAVENOUS at 19:32

## 2019-01-01 RX ADMIN — Medication 30 MG: at 14:36

## 2019-01-01 RX ADMIN — PROPOFOL 200 MG: 10 INJECTION, EMULSION INTRAVENOUS at 11:21

## 2019-01-01 RX ADMIN — ATORVASTATIN CALCIUM 20 MG: 20 TABLET, FILM COATED ORAL at 09:54

## 2019-01-01 RX ADMIN — ASPIRIN 81 MG 81 MG: 81 TABLET ORAL at 09:28

## 2019-01-01 RX ADMIN — SODIUM CHLORIDE, PRESERVATIVE FREE 10 ML: 5 INJECTION INTRAVENOUS at 08:28

## 2019-01-01 RX ADMIN — PIPERACILLIN SODIUM,TAZOBACTAM SODIUM 3.38 G: 3; .375 INJECTION, POWDER, FOR SOLUTION INTRAVENOUS at 19:03

## 2019-01-01 RX ADMIN — METRONIDAZOLE 500 MG: 500 INJECTION, SOLUTION INTRAVENOUS at 17:35

## 2019-01-01 RX ADMIN — ONDANSETRON 4 MG: 2 INJECTION INTRAMUSCULAR; INTRAVENOUS at 02:59

## 2019-01-01 RX ADMIN — HYDROCODONE BITARTRATE AND ACETAMINOPHEN 1 TABLET: 5; 325 TABLET ORAL at 05:15

## 2019-01-01 RX ADMIN — PROPOFOL 150 MG: 10 INJECTION, EMULSION INTRAVENOUS at 15:06

## 2019-01-01 RX ADMIN — ONDANSETRON 4 MG: 2 INJECTION INTRAMUSCULAR; INTRAVENOUS at 12:24

## 2019-01-01 RX ADMIN — SODIUM CHLORIDE, PRESERVATIVE FREE 10 ML: 5 INJECTION INTRAVENOUS at 09:58

## 2019-01-01 RX ADMIN — METOPROLOL TARTRATE 2.5 MG: 5 INJECTION, SOLUTION INTRAVENOUS at 15:29

## 2019-01-01 RX ADMIN — ONDANSETRON 4 MG: 2 INJECTION INTRAMUSCULAR; INTRAVENOUS at 19:35

## 2019-01-01 RX ADMIN — ACETAMINOPHEN 650 MG: 325 TABLET ORAL at 12:17

## 2019-01-01 RX ADMIN — ROCURONIUM BROMIDE 40 MG: 10 INJECTION INTRAVENOUS at 14:56

## 2019-01-01 RX ADMIN — SACUBITRIL AND VALSARTAN 1 TABLET: 24; 26 TABLET, FILM COATED ORAL at 21:29

## 2019-01-01 RX ADMIN — Medication 100 MG: at 14:36

## 2019-01-01 RX ADMIN — SODIUM CHLORIDE: 9 INJECTION, SOLUTION INTRAVENOUS at 18:03

## 2019-01-01 RX ADMIN — PIPERACILLIN SODIUM,TAZOBACTAM SODIUM 3.38 G: 3; .375 INJECTION, POWDER, FOR SOLUTION INTRAVENOUS at 19:06

## 2019-01-01 RX ADMIN — MIDAZOLAM 2 MG: 1 INJECTION INTRAMUSCULAR; INTRAVENOUS at 15:05

## 2019-01-01 RX ADMIN — CIPROFLOXACIN 400 MG: 2 INJECTION, SOLUTION INTRAVENOUS at 01:29

## 2019-01-01 RX ADMIN — HYDROCODONE BITARTRATE AND ACETAMINOPHEN 2 TABLET: 5; 325 TABLET ORAL at 03:23

## 2019-01-01 RX ADMIN — PROPOFOL 100 MG: 10 INJECTION, EMULSION INTRAVENOUS at 14:36

## 2019-01-01 RX ADMIN — ONDANSETRON 4 MG: 2 INJECTION INTRAMUSCULAR; INTRAVENOUS at 15:32

## 2019-01-01 RX ADMIN — ENOXAPARIN SODIUM 40 MG: 40 INJECTION SUBCUTANEOUS at 09:20

## 2019-01-01 RX ADMIN — ATORVASTATIN CALCIUM 20 MG: 20 TABLET, FILM COATED ORAL at 09:32

## 2019-01-01 RX ADMIN — ENOXAPARIN SODIUM 40 MG: 40 INJECTION SUBCUTANEOUS at 09:28

## 2019-01-01 RX ADMIN — SODIUM CHLORIDE, PRESERVATIVE FREE 10 ML: 5 INJECTION INTRAVENOUS at 22:47

## 2019-01-01 RX ADMIN — SACUBITRIL AND VALSARTAN 1 TABLET: 24; 26 TABLET, FILM COATED ORAL at 20:07

## 2019-01-01 RX ADMIN — FENTANYL CITRATE 50 MCG: 50 INJECTION INTRAMUSCULAR; INTRAVENOUS at 11:20

## 2019-01-01 RX ADMIN — PIPERACILLIN SODIUM,TAZOBACTAM SODIUM 3.38 G: 3; .375 INJECTION, POWDER, FOR SOLUTION INTRAVENOUS at 17:36

## 2019-01-01 RX ADMIN — SODIUM CHLORIDE: 9 INJECTION, SOLUTION INTRAVENOUS at 14:35

## 2019-01-01 RX ADMIN — HYDRALAZINE HYDROCHLORIDE 5 MG: 20 INJECTION INTRAMUSCULAR; INTRAVENOUS at 12:59

## 2019-01-01 RX ADMIN — HYDROCODONE BITARTRATE AND ACETAMINOPHEN 2 TABLET: 5; 325 TABLET ORAL at 08:26

## 2019-01-01 RX ADMIN — METRONIDAZOLE 500 MG: 500 INJECTION, SOLUTION INTRAVENOUS at 09:21

## 2019-01-01 RX ADMIN — SACUBITRIL AND VALSARTAN 1 TABLET: 24; 26 TABLET, FILM COATED ORAL at 22:02

## 2019-01-01 RX ADMIN — HYDROMORPHONE HYDROCHLORIDE 0.5 MG: 1 INJECTION, SOLUTION INTRAMUSCULAR; INTRAVENOUS; SUBCUTANEOUS at 16:50

## 2019-01-01 RX ADMIN — FUROSEMIDE 40 MG: 40 TABLET ORAL at 09:53

## 2019-01-01 RX ADMIN — SACUBITRIL AND VALSARTAN 1 TABLET: 24; 26 TABLET, FILM COATED ORAL at 08:25

## 2019-01-01 RX ADMIN — PIPERACILLIN SODIUM,TAZOBACTAM SODIUM 3.38 G: 3; .375 INJECTION, POWDER, FOR SOLUTION INTRAVENOUS at 03:38

## 2019-01-01 RX ADMIN — PROPOFOL 50 MG: 10 INJECTION, EMULSION INTRAVENOUS at 15:06

## 2019-01-01 RX ADMIN — SODIUM CHLORIDE: 9 INJECTION, SOLUTION INTRAVENOUS at 10:01

## 2019-01-01 RX ADMIN — ATORVASTATIN CALCIUM 20 MG: 20 TABLET, FILM COATED ORAL at 09:53

## 2019-01-01 RX ADMIN — PIPERACILLIN SODIUM,TAZOBACTAM SODIUM 3.38 G: 3; .375 INJECTION, POWDER, FOR SOLUTION INTRAVENOUS at 09:54

## 2019-01-01 RX ADMIN — ATORVASTATIN CALCIUM 20 MG: 20 TABLET, FILM COATED ORAL at 12:26

## 2019-01-01 RX ADMIN — SACUBITRIL AND VALSARTAN 1 TABLET: 24; 26 TABLET, FILM COATED ORAL at 10:50

## 2019-01-01 RX ADMIN — FUROSEMIDE 40 MG: 10 INJECTION, SOLUTION INTRAMUSCULAR; INTRAVENOUS at 20:45

## 2019-01-01 RX ADMIN — SODIUM CHLORIDE: 9 INJECTION, SOLUTION INTRAVENOUS at 08:59

## 2019-01-01 RX ADMIN — FENTANYL CITRATE 100 MCG: 50 INJECTION INTRAMUSCULAR; INTRAVENOUS at 15:06

## 2019-01-01 RX ADMIN — REGADENOSON 0.4 MG: 0.08 INJECTION, SOLUTION INTRAVENOUS at 11:44

## 2019-01-01 RX ADMIN — SACUBITRIL AND VALSARTAN 1 TABLET: 24; 26 TABLET, FILM COATED ORAL at 08:26

## 2019-01-01 RX ADMIN — SODIUM CHLORIDE: 9 INJECTION, SOLUTION INTRAVENOUS at 20:07

## 2019-01-01 RX ADMIN — ENOXAPARIN SODIUM 40 MG: 40 INJECTION SUBCUTANEOUS at 09:32

## 2019-01-01 RX ADMIN — ENOXAPARIN SODIUM 40 MG: 40 INJECTION SUBCUTANEOUS at 08:57

## 2019-01-01 RX ADMIN — SACUBITRIL AND VALSARTAN 1 TABLET: 24; 26 TABLET, FILM COATED ORAL at 08:28

## 2019-01-01 RX ADMIN — ATORVASTATIN CALCIUM 20 MG: 20 TABLET, FILM COATED ORAL at 08:20

## 2019-01-01 RX ADMIN — DEXAMETHASONE SODIUM PHOSPHATE 8 MG: 4 INJECTION, SOLUTION INTRAMUSCULAR; INTRAVENOUS at 11:32

## 2019-01-01 RX ADMIN — METRONIDAZOLE 500 MG: 500 INJECTION, SOLUTION INTRAVENOUS at 18:25

## 2019-01-01 RX ADMIN — ENOXAPARIN SODIUM 40 MG: 40 INJECTION SUBCUTANEOUS at 08:21

## 2019-01-01 RX ADMIN — SACUBITRIL AND VALSARTAN 1 TABLET: 24; 26 TABLET, FILM COATED ORAL at 10:09

## 2019-01-01 RX ADMIN — Medication 120 MG: at 15:06

## 2019-01-01 RX ADMIN — SODIUM CHLORIDE, PRESERVATIVE FREE 10 ML: 5 INJECTION INTRAVENOUS at 19:37

## 2019-01-01 RX ADMIN — SODIUM CHLORIDE, POTASSIUM CHLORIDE, SODIUM LACTATE AND CALCIUM CHLORIDE: 600; 310; 30; 20 INJECTION, SOLUTION INTRAVENOUS at 01:25

## 2019-01-01 RX ADMIN — MORPHINE SULFATE 2 MG: 2 INJECTION, SOLUTION INTRAMUSCULAR; INTRAVENOUS at 15:25

## 2019-01-01 RX ADMIN — DEXAMETHASONE SODIUM PHOSPHATE 8 MG: 4 INJECTION, SOLUTION INTRAMUSCULAR; INTRAVENOUS at 15:23

## 2019-01-01 RX ADMIN — SODIUM CHLORIDE, PRESERVATIVE FREE 10 ML: 5 INJECTION INTRAVENOUS at 09:32

## 2019-01-01 RX ADMIN — TETROFOSMIN 30 MILLICURIE: 1.38 INJECTION, POWDER, LYOPHILIZED, FOR SOLUTION INTRAVENOUS at 12:00

## 2019-01-01 RX ADMIN — SACUBITRIL AND VALSARTAN 1 TABLET: 24; 26 TABLET, FILM COATED ORAL at 14:26

## 2019-01-01 RX ADMIN — MORPHINE SULFATE 2 MG: 2 INJECTION, SOLUTION INTRAMUSCULAR; INTRAVENOUS at 17:37

## 2019-01-01 RX ADMIN — SODIUM CHLORIDE, PRESERVATIVE FREE 10 ML: 5 INJECTION INTRAVENOUS at 08:09

## 2019-01-01 RX ADMIN — ASPIRIN 81 MG: 81 TABLET ORAL at 09:53

## 2019-01-01 RX ADMIN — FENTANYL CITRATE 50 MCG: 50 INJECTION INTRAMUSCULAR; INTRAVENOUS at 15:06

## 2019-01-01 RX ADMIN — Medication 140 MG: at 11:21

## 2019-01-01 RX ADMIN — HYDROCODONE BITARTRATE AND ACETAMINOPHEN 2 TABLET: 5; 325 TABLET ORAL at 01:55

## 2019-01-01 RX ADMIN — MORPHINE SULFATE 2 MG: 2 INJECTION, SOLUTION INTRAMUSCULAR; INTRAVENOUS at 13:38

## 2019-01-01 RX ADMIN — SODIUM CHLORIDE: 9 INJECTION, SOLUTION INTRAVENOUS at 11:16

## 2019-01-01 RX ADMIN — PIPERACILLIN SODIUM,TAZOBACTAM SODIUM 3.38 G: 3; .375 INJECTION, POWDER, FOR SOLUTION INTRAVENOUS at 10:50

## 2019-01-01 RX ADMIN — SODIUM CHLORIDE 500 ML: 9 INJECTION, SOLUTION INTRAVENOUS at 12:21

## 2019-01-01 RX ADMIN — CARVEDILOL 6.25 MG: 6.25 TABLET, FILM COATED ORAL at 17:03

## 2019-01-01 RX ADMIN — HYDROCODONE BITARTRATE AND ACETAMINOPHEN 2 TABLET: 5; 325 TABLET ORAL at 04:37

## 2019-01-01 RX ADMIN — METRONIDAZOLE 500 MG: 500 INJECTION, SOLUTION INTRAVENOUS at 03:09

## 2019-01-01 RX ADMIN — METRONIDAZOLE 500 MG: 500 INJECTION, SOLUTION INTRAVENOUS at 02:49

## 2019-01-01 RX ADMIN — HYDROMORPHONE HYDROCHLORIDE 0.5 MG: 1 INJECTION, SOLUTION INTRAMUSCULAR; INTRAVENOUS; SUBCUTANEOUS at 16:19

## 2019-01-01 RX ADMIN — SODIUM CHLORIDE: 9 INJECTION, SOLUTION INTRAVENOUS at 14:28

## 2019-01-01 RX ADMIN — HYDROCODONE BITARTRATE AND ACETAMINOPHEN 1 TABLET: 5; 325 TABLET ORAL at 20:48

## 2019-01-01 RX ADMIN — FENTANYL CITRATE 100 MCG: 50 INJECTION INTRAMUSCULAR; INTRAVENOUS at 14:46

## 2019-01-01 RX ADMIN — IOPAMIDOL 75 ML: 755 INJECTION, SOLUTION INTRAVENOUS at 03:20

## 2019-01-01 RX ADMIN — FENTANYL CITRATE 50 MCG: 50 INJECTION INTRAMUSCULAR; INTRAVENOUS at 15:05

## 2019-01-01 RX ADMIN — MORPHINE SULFATE 2 MG: 2 INJECTION, SOLUTION INTRAMUSCULAR; INTRAVENOUS at 00:02

## 2019-01-01 RX ADMIN — MIDAZOLAM 2 MG: 1 INJECTION INTRAMUSCULAR; INTRAVENOUS at 14:31

## 2019-01-01 RX ADMIN — PHENYLEPHRINE HYDROCHLORIDE 150 MCG: 10 INJECTION INTRAVENOUS at 14:57

## 2019-01-01 RX ADMIN — SODIUM CHLORIDE 500 ML: 9 INJECTION, SOLUTION INTRAVENOUS at 20:20

## 2019-01-01 RX ADMIN — ASPIRIN 81 MG 81 MG: 81 TABLET ORAL at 08:08

## 2019-01-01 RX ADMIN — ATORVASTATIN CALCIUM 20 MG: 20 TABLET, FILM COATED ORAL at 08:26

## 2019-01-01 RX ADMIN — HYDROCODONE BITARTRATE AND ACETAMINOPHEN 2 TABLET: 5; 325 TABLET ORAL at 23:01

## 2019-01-01 RX ADMIN — Medication 6 MILLICURIE: at 13:50

## 2019-01-01 RX ADMIN — Medication 10 ML: at 03:48

## 2019-01-01 RX ADMIN — LIDOCAINE HYDROCHLORIDE 50 MG: 20 INJECTION, SOLUTION EPIDURAL; INFILTRATION; INTRACAUDAL; PERINEURAL at 14:36

## 2019-01-01 RX ADMIN — METRONIDAZOLE 500 MG: 500 INJECTION, SOLUTION INTRAVENOUS at 10:39

## 2019-01-01 RX ADMIN — METOPROLOL TARTRATE 2.5 MG: 5 INJECTION, SOLUTION INTRAVENOUS at 15:05

## 2019-01-01 RX ADMIN — ATORVASTATIN CALCIUM 20 MG: 20 TABLET, FILM COATED ORAL at 08:21

## 2019-01-01 RX ADMIN — SODIUM CHLORIDE: 9 INJECTION, SOLUTION INTRAVENOUS at 12:38

## 2019-01-01 RX ADMIN — CEFTRIAXONE 1 G: 1 INJECTION, POWDER, FOR SOLUTION INTRAMUSCULAR; INTRAVENOUS at 08:08

## 2019-01-01 RX ADMIN — CEFTRIAXONE 1 G: 1 INJECTION, POWDER, FOR SOLUTION INTRAMUSCULAR; INTRAVENOUS at 09:20

## 2019-01-01 RX ADMIN — METRONIDAZOLE 500 MG: 500 INJECTION, SOLUTION INTRAVENOUS at 17:29

## 2019-01-01 RX ADMIN — PIPERACILLIN SODIUM,TAZOBACTAM SODIUM 3.38 G: 3; .375 INJECTION, POWDER, FOR SOLUTION INTRAVENOUS at 12:15

## 2019-01-01 RX ADMIN — SODIUM CHLORIDE 500 ML: 9 INJECTION, SOLUTION INTRAVENOUS at 03:00

## 2019-01-01 RX ADMIN — TRAMADOL HYDROCHLORIDE 50 MG: 50 TABLET, FILM COATED ORAL at 19:36

## 2019-01-01 RX ADMIN — ENOXAPARIN SODIUM 40 MG: 40 INJECTION SUBCUTANEOUS at 12:23

## 2019-01-01 RX ADMIN — PIPERACILLIN AND TAZOBACTAM 4.5 G: 4; .5 INJECTION, POWDER, LYOPHILIZED, FOR SOLUTION INTRAVENOUS; PARENTERAL at 22:10

## 2019-01-01 RX ADMIN — CARVEDILOL 6.25 MG: 6.25 TABLET, FILM COATED ORAL at 17:29

## 2019-01-01 RX ADMIN — ATORVASTATIN CALCIUM 20 MG: 20 TABLET, FILM COATED ORAL at 08:55

## 2019-01-01 RX ADMIN — ENOXAPARIN SODIUM 40 MG: 40 INJECTION SUBCUTANEOUS at 08:08

## 2019-01-01 RX ADMIN — Medication 10 ML: at 03:42

## 2019-01-01 RX ADMIN — METRONIDAZOLE 500 MG: 500 INJECTION, SOLUTION INTRAVENOUS at 02:30

## 2019-01-01 RX ADMIN — ONDANSETRON 4 MG: 2 INJECTION INTRAMUSCULAR; INTRAVENOUS at 07:10

## 2019-01-01 RX ADMIN — SACUBITRIL AND VALSARTAN 1 TABLET: 24; 26 TABLET, FILM COATED ORAL at 08:55

## 2019-01-01 RX ADMIN — SODIUM CHLORIDE, PRESERVATIVE FREE 10 ML: 5 INJECTION INTRAVENOUS at 22:02

## 2019-01-01 RX ADMIN — LIDOCAINE HYDROCHLORIDE 60 MG: 20 INJECTION, SOLUTION EPIDURAL; INFILTRATION; INTRACAUDAL; PERINEURAL at 15:06

## 2019-01-01 RX ADMIN — FUROSEMIDE 40 MG: 40 TABLET ORAL at 15:09

## 2019-01-01 RX ADMIN — SODIUM CHLORIDE, PRESERVATIVE FREE 10 ML: 5 INJECTION INTRAVENOUS at 10:05

## 2019-01-01 RX ADMIN — SACUBITRIL AND VALSARTAN 1 TABLET: 24; 26 TABLET, FILM COATED ORAL at 22:45

## 2019-01-01 RX ADMIN — ASPIRIN 81 MG 81 MG: 81 TABLET ORAL at 13:25

## 2019-01-01 RX ADMIN — ASPIRIN 81 MG: 81 TABLET ORAL at 08:26

## 2019-01-01 RX ADMIN — PIPERACILLIN SODIUM,TAZOBACTAM SODIUM 3.38 G: 3; .375 INJECTION, POWDER, FOR SOLUTION INTRAVENOUS at 03:42

## 2019-01-01 RX ADMIN — PIPERACILLIN SODIUM,TAZOBACTAM SODIUM 3.38 G: 3; .375 INJECTION, POWDER, FOR SOLUTION INTRAVENOUS at 03:48

## 2019-01-01 RX ADMIN — HYDROCODONE BITARTRATE AND ACETAMINOPHEN 2 TABLET: 5; 325 TABLET ORAL at 19:28

## 2019-01-01 RX ADMIN — LIDOCAINE HYDROCHLORIDE 100 MG: 20 INJECTION, SOLUTION EPIDURAL; INFILTRATION; INTRACAUDAL; PERINEURAL at 11:21

## 2019-01-01 RX ADMIN — PIPERACILLIN SODIUM,TAZOBACTAM SODIUM 3.38 G: 3; .375 INJECTION, POWDER, FOR SOLUTION INTRAVENOUS at 10:09

## 2019-01-01 RX ADMIN — SODIUM CHLORIDE, PRESERVATIVE FREE 10 ML: 5 INJECTION INTRAVENOUS at 22:21

## 2019-01-01 RX ADMIN — CEFTRIAXONE 1 G: 1 INJECTION, POWDER, FOR SOLUTION INTRAMUSCULAR; INTRAVENOUS at 08:54

## 2019-01-01 RX ADMIN — SODIUM CHLORIDE: 9 INJECTION, SOLUTION INTRAVENOUS at 14:45

## 2019-01-01 RX ADMIN — PIPERACILLIN SODIUM,TAZOBACTAM SODIUM 3.38 G: 3; .375 INJECTION, POWDER, FOR SOLUTION INTRAVENOUS at 20:48

## 2019-01-01 RX ADMIN — SODIUM CHLORIDE, PRESERVATIVE FREE 10 ML: 5 INJECTION INTRAVENOUS at 21:00

## 2019-01-01 RX ADMIN — Medication 10 ML: at 20:45

## 2019-01-01 RX ADMIN — SACUBITRIL AND VALSARTAN 1 TABLET: 24; 26 TABLET, FILM COATED ORAL at 10:05

## 2019-01-01 RX ADMIN — ENOXAPARIN SODIUM 40 MG: 40 INJECTION SUBCUTANEOUS at 09:53

## 2019-01-01 RX ADMIN — PIPERACILLIN SODIUM,TAZOBACTAM SODIUM 3.38 G: 3; .375 INJECTION, POWDER, FOR SOLUTION INTRAVENOUS at 12:00

## 2019-01-01 RX ADMIN — SODIUM CHLORIDE, PRESERVATIVE FREE 10 ML: 5 INJECTION INTRAVENOUS at 08:57

## 2019-01-01 RX ADMIN — ASPIRIN 81 MG: 81 TABLET ORAL at 09:32

## 2019-01-01 RX ADMIN — NEOSTIGMINE 4 MG: 1 INJECTION INTRAVENOUS at 15:44

## 2019-01-01 ASSESSMENT — PULMONARY FUNCTION TESTS
PIF_VALUE: 1
PIF_VALUE: 23
PIF_VALUE: 16
PIF_VALUE: 0
PIF_VALUE: 18
PIF_VALUE: 16
PIF_VALUE: 21
PIF_VALUE: 1
PIF_VALUE: 15
PIF_VALUE: 20
PIF_VALUE: 18
PIF_VALUE: 3
PIF_VALUE: 23
PIF_VALUE: 16
PIF_VALUE: 25
PIF_VALUE: 1
PIF_VALUE: 23
PIF_VALUE: 1
PIF_VALUE: 18
PIF_VALUE: 16
PIF_VALUE: 23
PIF_VALUE: 23
PIF_VALUE: 42
PIF_VALUE: 1
PIF_VALUE: 55
PIF_VALUE: 1
PIF_VALUE: 22
PIF_VALUE: 18
PIF_VALUE: 17
PIF_VALUE: 1
PIF_VALUE: 23
PIF_VALUE: 0
PIF_VALUE: 23
PIF_VALUE: 24
PIF_VALUE: 24
PIF_VALUE: 32
PIF_VALUE: 26
PIF_VALUE: 24
PIF_VALUE: 4
PIF_VALUE: 23
PIF_VALUE: 1
PIF_VALUE: 24
PIF_VALUE: 22
PIF_VALUE: 21
PIF_VALUE: 32
PIF_VALUE: 0
PIF_VALUE: 23
PIF_VALUE: 25
PIF_VALUE: 2
PIF_VALUE: 23
PIF_VALUE: 17
PIF_VALUE: 15
PIF_VALUE: 18
PIF_VALUE: 0
PIF_VALUE: 20
PIF_VALUE: 15
PIF_VALUE: 21
PIF_VALUE: 17
PIF_VALUE: 16
PIF_VALUE: 6
PIF_VALUE: 23
PIF_VALUE: 18
PIF_VALUE: 23
PIF_VALUE: 1
PIF_VALUE: 1
PIF_VALUE: 0
PIF_VALUE: 0
PIF_VALUE: 11
PIF_VALUE: 18
PIF_VALUE: 0
PIF_VALUE: 24
PIF_VALUE: 11
PIF_VALUE: 12
PIF_VALUE: 16
PIF_VALUE: 1
PIF_VALUE: 17
PIF_VALUE: 23
PIF_VALUE: 30
PIF_VALUE: 3
PIF_VALUE: 23
PIF_VALUE: 0
PIF_VALUE: 13
PIF_VALUE: 22
PIF_VALUE: 20
PIF_VALUE: 16
PIF_VALUE: 18
PIF_VALUE: 3
PIF_VALUE: 0
PIF_VALUE: 15
PIF_VALUE: 20
PIF_VALUE: 1
PIF_VALUE: 18
PIF_VALUE: 2
PIF_VALUE: 15
PIF_VALUE: 6
PIF_VALUE: 23
PIF_VALUE: 1
PIF_VALUE: 16
PIF_VALUE: 20
PIF_VALUE: 18
PIF_VALUE: 0
PIF_VALUE: 22
PIF_VALUE: 24
PIF_VALUE: 22
PIF_VALUE: 25
PIF_VALUE: 23
PIF_VALUE: 19
PIF_VALUE: 41
PIF_VALUE: 21
PIF_VALUE: 21
PIF_VALUE: 0
PIF_VALUE: 22
PIF_VALUE: 22
PIF_VALUE: 18
PIF_VALUE: 22
PIF_VALUE: 0
PIF_VALUE: 2
PIF_VALUE: 13
PIF_VALUE: 15
PIF_VALUE: 19
PIF_VALUE: 4
PIF_VALUE: 4
PIF_VALUE: 25
PIF_VALUE: 0
PIF_VALUE: 22
PIF_VALUE: 20
PIF_VALUE: 18
PIF_VALUE: 15
PIF_VALUE: 2
PIF_VALUE: 18
PIF_VALUE: 25
PIF_VALUE: 18
PIF_VALUE: 22
PIF_VALUE: 23
PIF_VALUE: 0
PIF_VALUE: 7
PIF_VALUE: 22
PIF_VALUE: 24
PIF_VALUE: 22
PIF_VALUE: 19
PIF_VALUE: 42
PIF_VALUE: 15
PIF_VALUE: 19
PIF_VALUE: 23
PIF_VALUE: 0
PIF_VALUE: 2
PIF_VALUE: 22
PIF_VALUE: 16
PIF_VALUE: 18
PIF_VALUE: 13
PIF_VALUE: 3
PIF_VALUE: 16
PIF_VALUE: 23
PIF_VALUE: 6
PIF_VALUE: 15
PIF_VALUE: 15
PIF_VALUE: 16
PIF_VALUE: 23
PIF_VALUE: 21
PIF_VALUE: 22
PIF_VALUE: 17

## 2019-01-01 ASSESSMENT — PAIN SCALES - GENERAL
PAINLEVEL_OUTOF10: 0
PAINLEVEL_OUTOF10: 8
PAINLEVEL_OUTOF10: 0
PAINLEVEL_OUTOF10: 6
PAINLEVEL_OUTOF10: 3
PAINLEVEL_OUTOF10: 7
PAINLEVEL_OUTOF10: 0
PAINLEVEL_OUTOF10: 3
PAINLEVEL_OUTOF10: 0
PAINLEVEL_OUTOF10: 8
PAINLEVEL_OUTOF10: 3
PAINLEVEL_OUTOF10: 0
PAINLEVEL_OUTOF10: 0
PAINLEVEL_OUTOF10: 7
PAINLEVEL_OUTOF10: 8
PAINLEVEL_OUTOF10: 4
PAINLEVEL_OUTOF10: 0
PAINLEVEL_OUTOF10: 2
PAINLEVEL_OUTOF10: 0
PAINLEVEL_OUTOF10: 5
PAINLEVEL_OUTOF10: 0
PAINLEVEL_OUTOF10: 8
PAINLEVEL_OUTOF10: 4
PAINLEVEL_OUTOF10: 0
PAINLEVEL_OUTOF10: 6
PAINLEVEL_OUTOF10: 0
PAINLEVEL_OUTOF10: 8
PAINLEVEL_OUTOF10: 0
PAINLEVEL_OUTOF10: 8
PAINLEVEL_OUTOF10: 0
PAINLEVEL_OUTOF10: 6
PAINLEVEL_OUTOF10: 8
PAINLEVEL_OUTOF10: 3
PAINLEVEL_OUTOF10: 2
PAINLEVEL_OUTOF10: 0
PAINLEVEL_OUTOF10: 7
PAINLEVEL_OUTOF10: 6
PAINLEVEL_OUTOF10: 6
PAINLEVEL_OUTOF10: 0
PAINLEVEL_OUTOF10: 0
PAINLEVEL_OUTOF10: 5
PAINLEVEL_OUTOF10: 4
PAINLEVEL_OUTOF10: 8
PAINLEVEL_OUTOF10: 0
PAINLEVEL_OUTOF10: 0
PAINLEVEL_OUTOF10: 8
PAINLEVEL_OUTOF10: 0
PAINLEVEL_OUTOF10: 7
PAINLEVEL_OUTOF10: 0
PAINLEVEL_OUTOF10: 7
PAINLEVEL_OUTOF10: 0
PAINLEVEL_OUTOF10: 0
PAINLEVEL_OUTOF10: 4
PAINLEVEL_OUTOF10: 3
PAINLEVEL_OUTOF10: 0
PAINLEVEL_OUTOF10: 0
PAINLEVEL_OUTOF10: 6
PAINLEVEL_OUTOF10: 2
PAINLEVEL_OUTOF10: 8
PAINLEVEL_OUTOF10: 0
PAINLEVEL_OUTOF10: 0
PAINLEVEL_OUTOF10: 6
PAINLEVEL_OUTOF10: 0
PAINLEVEL_OUTOF10: 8
PAINLEVEL_OUTOF10: 7
PAINLEVEL_OUTOF10: 0
PAINLEVEL_OUTOF10: 0
PAINLEVEL_OUTOF10: 8
PAINLEVEL_OUTOF10: 0
PAINLEVEL_OUTOF10: 3
PAINLEVEL_OUTOF10: 7
PAINLEVEL_OUTOF10: 8
PAINLEVEL_OUTOF10: 0
PAINLEVEL_OUTOF10: 8

## 2019-01-01 ASSESSMENT — PAIN DESCRIPTION - PROGRESSION
CLINICAL_PROGRESSION: NOT CHANGED
CLINICAL_PROGRESSION: GRADUALLY IMPROVING
CLINICAL_PROGRESSION: NOT CHANGED
CLINICAL_PROGRESSION: RAPIDLY WORSENING
CLINICAL_PROGRESSION: NOT CHANGED
CLINICAL_PROGRESSION: RAPIDLY WORSENING
CLINICAL_PROGRESSION: GRADUALLY IMPROVING
CLINICAL_PROGRESSION: NOT CHANGED
CLINICAL_PROGRESSION: GRADUALLY IMPROVING
CLINICAL_PROGRESSION: NOT CHANGED
CLINICAL_PROGRESSION: GRADUALLY IMPROVING

## 2019-01-01 ASSESSMENT — PAIN DESCRIPTION - LOCATION
LOCATION: ABDOMEN
LOCATION: BACK
LOCATION: ABDOMEN
LOCATION: ABDOMEN;BACK
LOCATION: ABDOMEN
LOCATION: BACK
LOCATION: ABDOMEN
LOCATION: BACK
LOCATION: ABDOMEN
LOCATION: ABDOMEN
LOCATION: BACK
LOCATION: ABDOMEN
LOCATION: ABDOMEN

## 2019-01-01 ASSESSMENT — ENCOUNTER SYMPTOMS
RHINORRHEA: 0
ABDOMINAL PAIN: 1
BACK PAIN: 1
SHORTNESS OF BREATH: 0
NAUSEA: 1
SHORTNESS OF BREATH: 0
CONSTIPATION: 0
VOMITING: 1
CONSTIPATION: 0
BLOOD IN STOOL: 0
SHORTNESS OF BREATH: 0
ABDOMINAL PAIN: 1
BACK PAIN: 1
VOMITING: 0
CONSTIPATION: 0
ABDOMINAL PAIN: 0
SORE THROAT: 0
NAUSEA: 0
DIARRHEA: 0
COLOR CHANGE: 0
VOMITING: 1
NAUSEA: 1
BLOOD IN STOOL: 0
ABDOMINAL DISTENTION: 0
EYE REDNESS: 0
SHORTNESS OF BREATH: 0
DIARRHEA: 0
DIARRHEA: 1
SHORTNESS OF BREATH: 0

## 2019-01-01 ASSESSMENT — PAIN DESCRIPTION - PAIN TYPE
TYPE: ACUTE PAIN;SURGICAL PAIN
TYPE: ACUTE PAIN
TYPE: ACUTE PAIN;SURGICAL PAIN
TYPE: SURGICAL PAIN
TYPE: ACUTE PAIN;SURGICAL PAIN
TYPE: SURGICAL PAIN
TYPE: ACUTE PAIN
TYPE: ACUTE PAIN;SURGICAL PAIN
TYPE: CHRONIC PAIN
TYPE: ACUTE PAIN;SURGICAL PAIN
TYPE: CHRONIC PAIN
TYPE: CHRONIC PAIN
TYPE: ACUTE PAIN;SURGICAL PAIN
TYPE: ACUTE PAIN
TYPE: ACUTE PAIN

## 2019-01-01 ASSESSMENT — PAIN DESCRIPTION - FREQUENCY
FREQUENCY: INTERMITTENT
FREQUENCY: CONTINUOUS
FREQUENCY: INTERMITTENT
FREQUENCY: CONTINUOUS
FREQUENCY: INTERMITTENT
FREQUENCY: CONTINUOUS
FREQUENCY: INTERMITTENT
FREQUENCY: CONTINUOUS
FREQUENCY: CONTINUOUS
FREQUENCY: INTERMITTENT
FREQUENCY: CONTINUOUS
FREQUENCY: INTERMITTENT
FREQUENCY: INTERMITTENT
FREQUENCY: CONTINUOUS
FREQUENCY: INTERMITTENT
FREQUENCY: CONTINUOUS
FREQUENCY: INTERMITTENT

## 2019-01-01 ASSESSMENT — PAIN DESCRIPTION - ORIENTATION
ORIENTATION: MID
ORIENTATION: MID
ORIENTATION: MID;LOWER
ORIENTATION: MID
ORIENTATION: MID;LOWER
ORIENTATION: MID
ORIENTATION: MID
ORIENTATION: MID;LOWER
ORIENTATION: MID
ORIENTATION: LEFT
ORIENTATION: RIGHT
ORIENTATION: MID
ORIENTATION: RIGHT

## 2019-01-01 ASSESSMENT — PAIN DESCRIPTION - DESCRIPTORS
DESCRIPTORS: STABBING
DESCRIPTORS: ACHING;DISCOMFORT
DESCRIPTORS: CONSTANT
DESCRIPTORS: ACHING;DISCOMFORT
DESCRIPTORS: DULL;ACHING
DESCRIPTORS: ACHING;DISCOMFORT
DESCRIPTORS: DULL
DESCRIPTORS: PATIENT UNABLE TO DESCRIBE
DESCRIPTORS: DULL
DESCRIPTORS: SHOOTING
DESCRIPTORS: ACHING
DESCRIPTORS: PATIENT UNABLE TO DESCRIBE
DESCRIPTORS: ACHING;DISCOMFORT
DESCRIPTORS: DISCOMFORT
DESCRIPTORS: ACHING
DESCRIPTORS: ACHING;DISCOMFORT
DESCRIPTORS: ACHING
DESCRIPTORS: ACHING
DESCRIPTORS: ACHING;DISCOMFORT
DESCRIPTORS: ACHING;CONSTANT
DESCRIPTORS: ACHING;DISCOMFORT
DESCRIPTORS: ACHING;SHARP
DESCRIPTORS: ACHING
DESCRIPTORS: ACHING;CONSTANT

## 2019-01-01 ASSESSMENT — PAIN - FUNCTIONAL ASSESSMENT
PAIN_FUNCTIONAL_ASSESSMENT: PREVENTS OR INTERFERES SOME ACTIVE ACTIVITIES AND ADLS
PAIN_FUNCTIONAL_ASSESSMENT: PREVENTS OR INTERFERES WITH MANY ACTIVE NOT PASSIVE ACTIVITIES
PAIN_FUNCTIONAL_ASSESSMENT: PREVENTS OR INTERFERES SOME ACTIVE ACTIVITIES AND ADLS
PAIN_FUNCTIONAL_ASSESSMENT: 0-10
PAIN_FUNCTIONAL_ASSESSMENT: ACTIVITIES ARE NOT PREVENTED
PAIN_FUNCTIONAL_ASSESSMENT: PREVENTS OR INTERFERES SOME ACTIVE ACTIVITIES AND ADLS
PAIN_FUNCTIONAL_ASSESSMENT: 0-10
PAIN_FUNCTIONAL_ASSESSMENT: PREVENTS OR INTERFERES SOME ACTIVE ACTIVITIES AND ADLS
PAIN_FUNCTIONAL_ASSESSMENT: PREVENTS OR INTERFERES SOME ACTIVE ACTIVITIES AND ADLS
PAIN_FUNCTIONAL_ASSESSMENT: ACTIVITIES ARE NOT PREVENTED
PAIN_FUNCTIONAL_ASSESSMENT: PREVENTS OR INTERFERES SOME ACTIVE ACTIVITIES AND ADLS
PAIN_FUNCTIONAL_ASSESSMENT: ACTIVITIES ARE NOT PREVENTED
PAIN_FUNCTIONAL_ASSESSMENT: PREVENTS OR INTERFERES SOME ACTIVE ACTIVITIES AND ADLS
PAIN_FUNCTIONAL_ASSESSMENT: ACTIVITIES ARE NOT PREVENTED
PAIN_FUNCTIONAL_ASSESSMENT: PREVENTS OR INTERFERES SOME ACTIVE ACTIVITIES AND ADLS

## 2019-01-01 ASSESSMENT — PAIN DESCRIPTION - ONSET
ONSET: AWAKENED FROM SLEEP
ONSET: AWAKENED FROM SLEEP
ONSET: ON-GOING
ONSET: ON-GOING
ONSET: AWAKENED FROM SLEEP
ONSET: ON-GOING
ONSET: ON-GOING
ONSET: AWAKENED FROM SLEEP
ONSET: ON-GOING
ONSET: UNABLE TO ASSESS
ONSET: UNABLE TO ASSESS
ONSET: ON-GOING
ONSET: ON-GOING

## 2019-01-01 ASSESSMENT — PAIN DESCRIPTION - DIRECTION: RADIATING_TOWARDS: BACK

## 2019-01-04 ENCOUNTER — TELEPHONE (OUTPATIENT)
Dept: FAMILY MEDICINE CLINIC | Age: 64
End: 2019-01-04

## 2019-01-04 DIAGNOSIS — F32.4 MAJOR DEPRESSION SINGLE EPISODE, IN PARTIAL REMISSION (HCC): ICD-10-CM

## 2019-01-04 RX ORDER — VENLAFAXINE HYDROCHLORIDE 150 MG/1
CAPSULE, EXTENDED RELEASE ORAL
Qty: 90 CAPSULE | Refills: 1 | Status: CANCELLED | OUTPATIENT
Start: 2019-01-04

## 2019-01-04 RX ORDER — VENLAFAXINE HYDROCHLORIDE 150 MG/1
CAPSULE, EXTENDED RELEASE ORAL
Qty: 90 CAPSULE | Refills: 0 | Status: SHIPPED | OUTPATIENT
Start: 2019-01-04 | End: 2019-04-11 | Stop reason: SDUPTHER

## 2019-01-21 ASSESSMENT — ENCOUNTER SYMPTOMS: SHORTNESS OF BREATH: 0

## 2019-01-24 ENCOUNTER — OFFICE VISIT (OUTPATIENT)
Dept: FAMILY MEDICINE CLINIC | Age: 64
End: 2019-01-24
Payer: MEDICARE

## 2019-01-24 VITALS
DIASTOLIC BLOOD PRESSURE: 78 MMHG | BODY MASS INDEX: 35.84 KG/M2 | SYSTOLIC BLOOD PRESSURE: 130 MMHG | HEIGHT: 66 IN | WEIGHT: 223 LBS

## 2019-01-24 DIAGNOSIS — M51.16 LUMBAR DISC DISEASE WITH RADICULOPATHY: ICD-10-CM

## 2019-01-24 DIAGNOSIS — F32.4 MAJOR DEPRESSION SINGLE EPISODE, IN PARTIAL REMISSION (HCC): ICD-10-CM

## 2019-01-24 DIAGNOSIS — M54.50 CHRONIC MIDLINE LOW BACK PAIN WITHOUT SCIATICA: ICD-10-CM

## 2019-01-24 DIAGNOSIS — E78.00 HYPERCHOLESTEROLEMIA: ICD-10-CM

## 2019-01-24 DIAGNOSIS — I50.30 CHF WITH LEFT VENTRICULAR DIASTOLIC DYSFUNCTION, NYHA CLASS 2 (HCC): ICD-10-CM

## 2019-01-24 DIAGNOSIS — F52.21 ERECTILE DYSFUNCTION OF NON-ORGANIC ORIGIN: ICD-10-CM

## 2019-01-24 DIAGNOSIS — G35 MS (MULTIPLE SCLEROSIS) (HCC): ICD-10-CM

## 2019-01-24 DIAGNOSIS — I25.5 ISCHEMIC CARDIOMYOPATHY: ICD-10-CM

## 2019-01-24 DIAGNOSIS — G89.29 CHRONIC BILATERAL LOW BACK PAIN WITH SCIATICA, SCIATICA LATERALITY UNSPECIFIED: ICD-10-CM

## 2019-01-24 DIAGNOSIS — G89.29 CHRONIC MIDLINE LOW BACK PAIN WITHOUT SCIATICA: ICD-10-CM

## 2019-01-24 DIAGNOSIS — I10 ESSENTIAL HYPERTENSION: Primary | ICD-10-CM

## 2019-01-24 DIAGNOSIS — M54.40 CHRONIC BILATERAL LOW BACK PAIN WITH SCIATICA, SCIATICA LATERALITY UNSPECIFIED: ICD-10-CM

## 2019-01-24 PROCEDURE — 1101F PT FALLS ASSESS-DOCD LE1/YR: CPT | Performed by: FAMILY MEDICINE

## 2019-01-24 PROCEDURE — G8482 FLU IMMUNIZE ORDER/ADMIN: HCPCS | Performed by: FAMILY MEDICINE

## 2019-01-24 PROCEDURE — 1036F TOBACCO NON-USER: CPT | Performed by: FAMILY MEDICINE

## 2019-01-24 PROCEDURE — 3017F COLORECTAL CA SCREEN DOC REV: CPT | Performed by: FAMILY MEDICINE

## 2019-01-24 PROCEDURE — 99214 OFFICE O/P EST MOD 30 MIN: CPT | Performed by: FAMILY MEDICINE

## 2019-01-24 PROCEDURE — 1123F ACP DISCUSS/DSCN MKR DOCD: CPT | Performed by: FAMILY MEDICINE

## 2019-01-24 PROCEDURE — G8598 ASA/ANTIPLAT THER USED: HCPCS | Performed by: FAMILY MEDICINE

## 2019-01-24 PROCEDURE — G8427 DOCREV CUR MEDS BY ELIG CLIN: HCPCS | Performed by: FAMILY MEDICINE

## 2019-01-24 PROCEDURE — G8417 CALC BMI ABV UP PARAM F/U: HCPCS | Performed by: FAMILY MEDICINE

## 2019-01-24 PROCEDURE — 4040F PNEUMOC VAC/ADMIN/RCVD: CPT | Performed by: FAMILY MEDICINE

## 2019-01-24 RX ORDER — HYDROCODONE BITARTRATE AND ACETAMINOPHEN 5; 325 MG/1; MG/1
1 TABLET ORAL EVERY 6 HOURS PRN
Qty: 120 TABLET | Refills: 0 | Status: SHIPPED | OUTPATIENT
Start: 2019-01-24 | End: 2019-04-15 | Stop reason: SDUPTHER

## 2019-03-05 ENCOUNTER — TELEPHONE (OUTPATIENT)
Dept: FAMILY MEDICINE CLINIC | Age: 64
End: 2019-03-05

## 2019-03-05 DIAGNOSIS — G35 MS (MULTIPLE SCLEROSIS) (HCC): ICD-10-CM

## 2019-03-05 DIAGNOSIS — M51.16 LUMBAR DISC DISEASE WITH RADICULOPATHY: Primary | ICD-10-CM

## 2019-03-15 ENCOUNTER — HOSPITAL ENCOUNTER (OUTPATIENT)
Dept: PHYSICAL THERAPY | Age: 64
Setting detail: THERAPIES SERIES
Discharge: HOME | End: 2019-03-15
Payer: MEDICARE

## 2019-03-15 PROCEDURE — 97162 PT EVAL MOD COMPLEX 30 MIN: CPT

## 2019-03-15 PROCEDURE — 97530 THERAPEUTIC ACTIVITIES: CPT

## 2019-03-15 PROCEDURE — 97110 THERAPEUTIC EXERCISES: CPT

## 2019-03-15 ASSESSMENT — PAIN DESCRIPTION - PAIN TYPE: TYPE: CHRONIC PAIN

## 2019-03-15 ASSESSMENT — PAIN SCALES - GENERAL: PAINLEVEL_OUTOF10: 8

## 2019-03-15 ASSESSMENT — PAIN DESCRIPTION - LOCATION: LOCATION: BACK;LEG

## 2019-03-15 ASSESSMENT — PAIN DESCRIPTION - FREQUENCY: FREQUENCY: INTERMITTENT

## 2019-03-15 ASSESSMENT — PAIN DESCRIPTION - ORIENTATION: ORIENTATION: RIGHT;LEFT

## 2019-03-18 ENCOUNTER — HOSPITAL ENCOUNTER (OUTPATIENT)
Dept: PHYSICAL THERAPY | Age: 64
Setting detail: THERAPIES SERIES
Discharge: HOME | End: 2019-03-18
Payer: MEDICARE

## 2019-03-18 PROCEDURE — 97110 THERAPEUTIC EXERCISES: CPT

## 2019-03-25 ENCOUNTER — HOSPITAL ENCOUNTER (OUTPATIENT)
Dept: PHYSICAL THERAPY | Age: 64
Setting detail: THERAPIES SERIES
Discharge: HOME | End: 2019-03-25
Payer: MEDICARE

## 2019-03-27 ENCOUNTER — HOSPITAL ENCOUNTER (OUTPATIENT)
Dept: PHYSICAL THERAPY | Age: 64
Setting detail: THERAPIES SERIES
Discharge: HOME | End: 2019-03-27
Payer: MEDICARE

## 2019-03-27 PROCEDURE — 97110 THERAPEUTIC EXERCISES: CPT

## 2019-04-15 ENCOUNTER — HOSPITAL ENCOUNTER (OUTPATIENT)
Dept: PHYSICAL THERAPY | Age: 64
Setting detail: THERAPIES SERIES
Discharge: HOME | End: 2019-04-15
Payer: MEDICARE

## 2019-04-15 ENCOUNTER — OFFICE VISIT (OUTPATIENT)
Dept: FAMILY MEDICINE CLINIC | Age: 64
End: 2019-04-15
Payer: MEDICARE

## 2019-04-15 ENCOUNTER — TELEPHONE (OUTPATIENT)
Dept: FAMILY MEDICINE CLINIC | Age: 64
End: 2019-04-15

## 2019-04-15 VITALS
DIASTOLIC BLOOD PRESSURE: 64 MMHG | HEIGHT: 66 IN | WEIGHT: 216 LBS | SYSTOLIC BLOOD PRESSURE: 108 MMHG | BODY MASS INDEX: 34.72 KG/M2

## 2019-04-15 DIAGNOSIS — M54.40 CHRONIC BILATERAL LOW BACK PAIN WITH SCIATICA, SCIATICA LATERALITY UNSPECIFIED: ICD-10-CM

## 2019-04-15 DIAGNOSIS — G89.29 CHRONIC BILATERAL LOW BACK PAIN WITH SCIATICA, SCIATICA LATERALITY UNSPECIFIED: ICD-10-CM

## 2019-04-15 DIAGNOSIS — G89.29 CHRONIC MIDLINE LOW BACK PAIN WITHOUT SCIATICA: Primary | ICD-10-CM

## 2019-04-15 DIAGNOSIS — M54.50 CHRONIC MIDLINE LOW BACK PAIN WITHOUT SCIATICA: Primary | ICD-10-CM

## 2019-04-15 DIAGNOSIS — M51.16 LUMBAR DISC DISEASE WITH RADICULOPATHY: ICD-10-CM

## 2019-04-15 PROCEDURE — G8427 DOCREV CUR MEDS BY ELIG CLIN: HCPCS | Performed by: FAMILY MEDICINE

## 2019-04-15 PROCEDURE — G8598 ASA/ANTIPLAT THER USED: HCPCS | Performed by: FAMILY MEDICINE

## 2019-04-15 PROCEDURE — G8417 CALC BMI ABV UP PARAM F/U: HCPCS | Performed by: FAMILY MEDICINE

## 2019-04-15 PROCEDURE — 3017F COLORECTAL CA SCREEN DOC REV: CPT | Performed by: FAMILY MEDICINE

## 2019-04-15 PROCEDURE — 99214 OFFICE O/P EST MOD 30 MIN: CPT | Performed by: FAMILY MEDICINE

## 2019-04-15 PROCEDURE — 1036F TOBACCO NON-USER: CPT | Performed by: FAMILY MEDICINE

## 2019-04-15 PROCEDURE — 4040F PNEUMOC VAC/ADMIN/RCVD: CPT | Performed by: FAMILY MEDICINE

## 2019-04-15 PROCEDURE — 1123F ACP DISCUSS/DSCN MKR DOCD: CPT | Performed by: FAMILY MEDICINE

## 2019-04-15 RX ORDER — HYDROCODONE BITARTRATE AND ACETAMINOPHEN 5; 325 MG/1; MG/1
1 TABLET ORAL EVERY 6 HOURS PRN
Qty: 120 TABLET | Refills: 0 | Status: SHIPPED | OUTPATIENT
Start: 2019-04-15 | End: 2019-06-05 | Stop reason: SDUPTHER

## 2019-04-15 RX ORDER — LANOLIN ALCOHOL/MO/W.PET/CERES
1000 CREAM (GRAM) TOPICAL DAILY
COMMUNITY

## 2019-04-15 ASSESSMENT — ENCOUNTER SYMPTOMS: BACK PAIN: 1

## 2019-04-15 NOTE — PROGRESS NOTES
Physical Therapy  Cancellation/No-show Note  Patient Name:  Gerri Rodrigez  :  1955   Date:  4/15/2019  Cancelled visits to date: 2  No-shows to date: 1    Patient status for today's appointment patient:  [x]  Cancelled 3/25/19, 4/15   []  Rescheduled appointment  []  No-show  4/10/19      Reason given by patient:  []  Patient ill  [x]  Conflicting appointment  []  No transportation    []  Conflict with work  []  No reason given  []  Other:     Comments:  Toothache      Phone call information:   []  Phone call made today to patient at _ time at number provided:      []  Patient answered, conversation as follows:    []  Patient did not answer, message left as follows:  [x]  Phone call not made today    Electronically signed by:  Aly Ruano PT

## 2019-04-17 ENCOUNTER — TELEPHONE (OUTPATIENT)
Dept: CARDIOLOGY CLINIC | Age: 64
End: 2019-04-17

## 2019-04-17 ENCOUNTER — PROCEDURE VISIT (OUTPATIENT)
Dept: CARDIOLOGY CLINIC | Age: 64
End: 2019-04-17

## 2019-04-17 DIAGNOSIS — Z95.810 BIVENTRICULAR AUTOMATIC IMPLANTABLE CARDIOVERTER DEFIBRILLATOR IN SITU: ICD-10-CM

## 2019-04-17 DIAGNOSIS — I25.5 ISCHEMIC CARDIOMYOPATHY: Chronic | ICD-10-CM

## 2019-04-17 DIAGNOSIS — I50.22 HEART FAILURE, CHRONIC SYSTOLIC (HCC): ICD-10-CM

## 2019-04-17 NOTE — TELEPHONE ENCOUNTER
Trino calling back to talk to Nadeem Alexander. He does have an appt to see  on 5/24/19 @ 2 pm ok per Mary Duque. He will wait for Nadeem Alexander to call back later this afternoon.

## 2019-04-17 NOTE — TELEPHONE ENCOUNTER
I spoke w/ patient. He states that he is feeling fine now. When he got shocked, he has just got done cutting the grass outside and had come inside to rest.     I re-confirmed his appt for next week and told him that he may start to hear a tone from his device and that he will hear that every morning which will alarm until his device is checked in office. He is okay with this.

## 2019-04-17 NOTE — TELEPHONE ENCOUNTER
Dr. Vernon Amaya reviewed pt's interrogation. Dr. Vernon Amaya would like pt to come in for an o/v to further discuss.    When he calls back, he will need an o/v, per Dr. Shiva Yoo request.

## 2019-04-17 NOTE — TELEPHONE ENCOUNTER
Called pt today to discuss [facility]. Pt was shocked x2 last night around 6pm for VT at approximately 230bpm.     I spoke w/ pt's wife who said that pt was sleeping right now, but he was aware that he was shocked last night. She will have him call me when he wakes up today.

## 2019-04-24 ENCOUNTER — PROCEDURE VISIT (OUTPATIENT)
Dept: CARDIOLOGY CLINIC | Age: 64
End: 2019-04-24
Payer: MEDICARE

## 2019-04-24 ENCOUNTER — OFFICE VISIT (OUTPATIENT)
Dept: CARDIOLOGY CLINIC | Age: 64
End: 2019-04-24
Payer: MEDICARE

## 2019-04-24 VITALS
OXYGEN SATURATION: 98 % | BODY MASS INDEX: 34.23 KG/M2 | DIASTOLIC BLOOD PRESSURE: 78 MMHG | SYSTOLIC BLOOD PRESSURE: 106 MMHG | WEIGHT: 213 LBS | HEART RATE: 72 BPM | HEIGHT: 66 IN

## 2019-04-24 DIAGNOSIS — I50.22 HEART FAILURE, CHRONIC SYSTOLIC (HCC): ICD-10-CM

## 2019-04-24 DIAGNOSIS — Z95.810 BIVENTRICULAR AUTOMATIC IMPLANTABLE CARDIOVERTER DEFIBRILLATOR IN SITU: ICD-10-CM

## 2019-04-24 DIAGNOSIS — I25.5 ISCHEMIC CARDIOMYOPATHY: Chronic | ICD-10-CM

## 2019-04-24 DIAGNOSIS — I25.5 ISCHEMIC CARDIOMYOPATHY: Primary | Chronic | ICD-10-CM

## 2019-04-24 PROCEDURE — 93290 INTERROG DEV EVAL ICPMS IP: CPT | Performed by: INTERNAL MEDICINE

## 2019-04-24 PROCEDURE — 1123F ACP DISCUSS/DSCN MKR DOCD: CPT | Performed by: INTERNAL MEDICINE

## 2019-04-24 PROCEDURE — 93000 ELECTROCARDIOGRAM COMPLETE: CPT | Performed by: INTERNAL MEDICINE

## 2019-04-24 PROCEDURE — G8427 DOCREV CUR MEDS BY ELIG CLIN: HCPCS | Performed by: INTERNAL MEDICINE

## 2019-04-24 PROCEDURE — G8598 ASA/ANTIPLAT THER USED: HCPCS | Performed by: INTERNAL MEDICINE

## 2019-04-24 PROCEDURE — 4040F PNEUMOC VAC/ADMIN/RCVD: CPT | Performed by: INTERNAL MEDICINE

## 2019-04-24 PROCEDURE — G8417 CALC BMI ABV UP PARAM F/U: HCPCS | Performed by: INTERNAL MEDICINE

## 2019-04-24 PROCEDURE — 1036F TOBACCO NON-USER: CPT | Performed by: INTERNAL MEDICINE

## 2019-04-24 PROCEDURE — 3017F COLORECTAL CA SCREEN DOC REV: CPT | Performed by: INTERNAL MEDICINE

## 2019-04-24 PROCEDURE — 93284 PRGRMG EVAL IMPLANTABLE DFB: CPT | Performed by: INTERNAL MEDICINE

## 2019-04-24 PROCEDURE — 99214 OFFICE O/P EST MOD 30 MIN: CPT | Performed by: INTERNAL MEDICINE

## 2019-04-24 NOTE — LETTER
[address]   Cardiac Electrophysiology Consultation   Date: 4/24/2019  Reason for Consultation: ICD shock   Consult Requesting Physician: Dr. Alyson Us     Chief Complaint:   Chief Complaint   Patient presents with    Cardiomyopathy     Patient was shocked by ICD d/t V-tach on 4/16- device check today. Pt states feeling well from a cardiac standpoint.  Discuss Medications     Patient not taking Coreg as prescribed- takes it \"when he wants to\" per patient's wife. HPI: Alina Douglas is a 70 y.o. with a past medical history of CHF, HTN, HLD, CAD s/p PCI who presents today for management of his ICD after he received a shocked for VT noted on his interogation 4/16/19. He typically follows with Dr. Coral Ortega for his routine cardiology care. He had a BiV ICD placed 12/16/16. Interval History: Today, he is accompanied by his wife. He was under the impression he was shocked by a ceiling fan and was unaware that his device delivered the shock. He denies any recurrence of any arrythmias. He denies any new sounding cardiac complaints. He denies any LE edema or worsening shortness of breath. He states he is noncompliant with his medications at times, he states \"he will forget to take them. \" Patient denies exertional chest pain/pressure, dyspnea at rest, PARRY, PND, orthopnea, palpitations, lightheadedness, weight changes, changes in LE edema, and syncope.     Past Medical History:   Diagnosis Date    CHF with left ventricular diastolic dysfunction, NYHA class 2 (HCC)     Chronic midline low back pain without sciatica     Coronary artery disease involving native coronary artery of native heart without angina pectoris 1-14-15    Drug Eluting Stents x 3 / Dr. Keya Cam Erectile dysfunction of non-organic origin     Essential hypertension     [facility]     Hypercholesterolemia     Ischemic cardiomyopathy     Dr. Keya Cam Lumbar disc disease with radiculopathy laminectomy dr Tati Thompson  1/2014    Major depression single episode, in partial [facility]     MS (multiple sclerosis) ([facility]     Dr. Barbara Medellin Non morbid obesity due to excess calories     Rotator cuff tear     Lt    TIA (transient ischemic attack)         Past Surgical History:   Procedure Laterality Date    CARDIAC DEFIBRILLATOR PLACEMENT  12/2016    CORONARY ANGIOPLASTY WITH STENT PLACEMENT  1-14-15    Drug Eluting Stents x 3    LUMBAR LAMINECTOMY  jan 2014    dr Mya Mejias      from arm       Allergies:  No Known Allergies    Medication:   Prior to Admission medications    Medication Sig Start Date End Date Taking? Authorizing Provider   vitamin B-12 (CYANOCOBALAMIN) 1000 MCG tablet Take 1,000 mcg by mouth daily   Yes Historical Provider, MD   HYDROcodone-acetaminophen (NORCO) 5-325 MG per tablet Take 1 tablet by mouth every 6 hours as needed for Pain for up to 30 days. 4/15/19 5/15/19 Yes Vu Hummel,    venlafaxine (EFFEXOR XR) 150 MG extended release capsule TAKE 1 CAPSULE BY MOUTH EVERY DAY 4/11/19  Yes Anthony Barnes,    buPROPion [facility] SR) 150 MG extended release tablet TAKE 1 TABLET BY MOUTH TWICE DAILY 4/4/19  Yes Anthony Barnes DO   carvedilol (COREG) 3.125 MG tablet Take 1 tablet by mouth 2 times daily (with meals) 11/29/18  Yes Donaldo Escamilla MD   sacubitril-valsartan [facility]) 24-26 MG per tablet Take 1 tablet by mouth 2 times daily 10/12/18  Yes Donaldo Escamilla MD   atorvastatin (LIPITOR) 20 MG tablet TAKE 1 TABLET EVERY DAY 10/12/18  Yes Donaldo Escamilla MD   Cholecalciferol (VITAMIN D) 2000 UNITS CAPS capsule Take 1 capsule by mouth daily. Yes Historical Provider, MD   aspirin 81 MG EC tablet Take 81 mg by mouth daily. Yes Historical Provider, MD       Social History:   reports that he has quit smoking.  He has quit using smokeless tobacco. He reports that he drinks alcohol. He reports that he does not use drugs. Family History:  family history includes Diabetes in an other family member; High Blood Pressure in his father; Stroke in his father. Reviewed. Denies family history of sudden cardiac death, arrhythmia, premature CAD    Review of System:    · General ROS: negative for - chills, fever   · Psychological ROS: negative for - anxiety or depression  · Ophthalmic ROS: negative for - eye pain or loss of vision  · ENT ROS: negative for - epistaxis, headaches, nasal discharge, sore throat   · Allergy and Immunology ROS: negative for - hives, nasal congestion   · Hematological and Lymphatic ROS: negative for - bleeding problems, blood clots, bruising or jaundice  · Endocrine ROS: negative for - skin changes, temperature intolerance or unexpected weight changes  · Respiratory ROS: negative for - cough, hemoptysis, pleuritic pain, SOB, sputum changes or wheezing  · Cardiovascular ROS: Per HPI. · Gastrointestinal ROS: negative for - abdominal pain, blood in stools, diarrhea, hematemesis, melena, nausea/vomiting or swallowing difficulty/pain  · Genito-Urinary ROS: negative for - dysuria or incontinence  · Musculoskeletal ROS: negative for - joint swelling or muscle pain  · Neurological ROS: negative for - confusion, dizziness, gait disturbance, headaches, numbness/tingling, seizures, speech problems, tremors, visual changes or weakness  · Dermatological ROS: negative for - rash    Physical Examination:  Vitals:    04/24/19 1422   BP: 106/78   Pulse: 72   SpO2: 98%     · Constitutional: Oriented. No distress. · Head: Normocephalic and atraumatic. · Mouth/Throat: Oropharynx is clear and moist.   · Eyes: Conjunctivae normal. EOM are normal.   · Neck: Normal range of motion. Neck supple. No rigidity. No JVD present. · Cardiovascular: Normal rate, regular rhythm, S1&S2 and intact distal pulses.

## 2019-04-24 NOTE — PROGRESS NOTES
[facility]   Cardiac Electrophysiology Consultation   Date: 4/24/2019  Reason for Consultation: ICD shock   Consult Requesting Physician: Dr. Parish Salgado     Chief Complaint:   Chief Complaint   Patient presents with    Cardiomyopathy     Patient was shocked by ICD d/t V-tach on 4/16- device check today. Pt states feeling well from a cardiac standpoint.  Discuss Medications     Patient not taking Coreg as prescribed- takes it \"when he wants to\" per patient's wife. HPI: Elie Burris is a 70 y.o. with a past medical history of CHF, HTN, HLD, CAD s/p PCI who presents today for management of his ICD after he received a shocked for VT noted on his interogation 4/16/19. He typically follows with Dr. Arabella Ladd for his routine cardiology care. He had a BiV ICD placed 12/16/16. Interval History: Today, he is accompanied by his wife. He was under the impression he was shocked by a ceiling fan and was unaware that his device delivered the shock. He denies any recurrence of any arrythmias. He denies any new sounding cardiac complaints. He denies any LE edema or worsening shortness of breath. He states he is noncompliant with his medications at times, he states \"he will forget to take them. \" Patient denies exertional chest pain/pressure, dyspnea at rest, PARRY, PND, orthopnea, palpitations, lightheadedness, weight changes, changes in LE edema, and syncope.     Past Medical History:   Diagnosis Date    CHF with left ventricular diastolic dysfunction, NYHA class 2 (HCC)     Chronic midline low back pain without sciatica     Coronary artery disease involving native coronary artery of native heart without angina pectoris 1-14-15    Drug Eluting Stents x 3 / Dr. Héctor Houser Erectile dysfunction of non-organic origin     Essential hypertension     [facility]     Hypercholesterolemia     Ischemic cardiomyopathy     Dr. Héctor Houser Lumbar disc disease with radiculopathy     laminectomy  juana  1/2014    Major depression single episode, in partial [facility]     MS (multiple sclerosis) ([facility]     Dr. Ladarius Pascal Non morbid obesity due to excess calories     Rotator cuff tear     Lt    TIA (transient ischemic attack)         Past Surgical History:   Procedure Laterality Date    CARDIAC DEFIBRILLATOR PLACEMENT  12/2016    CORONARY ANGIOPLASTY WITH STENT PLACEMENT  1-14-15    Drug Eluting Stents x 3    LUMBAR LAMINECTOMY  jan 2014    dr Laith Cruz      from arm       Allergies:  No Known Allergies    Medication:   Prior to Admission medications    Medication Sig Start Date End Date Taking? Authorizing Provider   vitamin B-12 (CYANOCOBALAMIN) 1000 MCG tablet Take 1,000 mcg by mouth daily   Yes Historical Provider, MD   HYDROcodone-acetaminophen (NORCO) 5-325 MG per tablet Take 1 tablet by mouth every 6 hours as needed for Pain for up to 30 days. 4/15/19 5/15/19 Yes Vu Hummel,    venlafaxine (EFFEXOR XR) 150 MG extended release capsule TAKE 1 CAPSULE BY MOUTH EVERY DAY 4/11/19  Yes Bud Avalos, DO   buPROPion [facility] SR) 150 MG extended release tablet TAKE 1 TABLET BY MOUTH TWICE DAILY 4/4/19  Yes Bud Avalos, DO   carvedilol (COREG) 3.125 MG tablet Take 1 tablet by mouth 2 times daily (with meals) 11/29/18  Yes Mo Pepe MD   sacubitril-valsartan [facility]) 24-26 MG per tablet Take 1 tablet by mouth 2 times daily 10/12/18  Yes Mo Pepe MD   atorvastatin (LIPITOR) 20 MG tablet TAKE 1 TABLET EVERY DAY 10/12/18  Yes Mo Pepe MD   Cholecalciferol (VITAMIN D) 2000 UNITS CAPS capsule Take 1 capsule by mouth daily. Yes Historical Provider, MD   aspirin 81 MG EC tablet Take 81 mg by mouth daily. Yes Historical Provider, MD       Social History:   reports that he has quit smoking. He has quit using smokeless tobacco. He reports that he drinks alcohol.  He reports that he does not use drugs. Family History:  family history includes Diabetes in an other family member; High Blood Pressure in his father; Stroke in his father. Reviewed. Denies family history of sudden cardiac death, arrhythmia, premature CAD    Review of System:    · General ROS: negative for - chills, fever   · Psychological ROS: negative for - anxiety or depression  · Ophthalmic ROS: negative for - eye pain or loss of vision  · ENT ROS: negative for - epistaxis, headaches, nasal discharge, sore throat   · Allergy and Immunology ROS: negative for - hives, nasal congestion   · Hematological and Lymphatic ROS: negative for - bleeding problems, blood clots, bruising or jaundice  · Endocrine ROS: negative for - skin changes, temperature intolerance or unexpected weight changes  · Respiratory ROS: negative for - cough, hemoptysis, pleuritic pain, SOB, sputum changes or wheezing  · Cardiovascular ROS: Per HPI. · Gastrointestinal ROS: negative for - abdominal pain, blood in stools, diarrhea, hematemesis, melena, nausea/vomiting or swallowing difficulty/pain  · Genito-Urinary ROS: negative for - dysuria or incontinence  · Musculoskeletal ROS: negative for - joint swelling or muscle pain  · Neurological ROS: negative for - confusion, dizziness, gait disturbance, headaches, numbness/tingling, seizures, speech problems, tremors, visual changes or weakness  · Dermatological ROS: negative for - rash    Physical Examination:  Vitals:    04/24/19 1422   BP: 106/78   Pulse: 72   SpO2: 98%     · Constitutional: Oriented. No distress. · Head: Normocephalic and atraumatic. · Mouth/Throat: Oropharynx is clear and moist.   · Eyes: Conjunctivae normal. EOM are normal.   · Neck: Normal range of motion. Neck supple. No rigidity. No JVD present. · Cardiovascular: Normal rate, regular rhythm, S1&S2 and intact distal pulses. · Pulmonary/Chest: Bilateral respiratory sounds. No wheezes. No rhonchi. · Abdominal: Soft.  Bowel sounds present. No distension, No tenderness. · Musculoskeletal: No tenderness. No edema    · Lymphadenopathy: Has no cervical adenopathy. · Neurological: Alert and oriented. Cranial nerve appears intact, No Gross deficit   · Skin: Skin is warm and dry. No rash noted. · Psychiatric: Has a normal mood, affect and behavior     Labs:  Reviewed. ECG: reviewed, Electronic ventricular pacemaker with underlying sinus rhythm, v-rate of 70 bpm with QRS duration 122 ms. No pathologic Q waves, ventricular pre-excitation, or QT prolongation. Studies:   1. Event monitor:   None on file     2. Echo: 6/30/17: Moderately dilated LV with EF 20-25%; Distal septal wall thinning with akinesis. Postero lateral and inferior akinesis as well. Mild mitral regurgitation is present. Mildly dilated left atrium. Trace aortic regurgitation is present. The RV is mildly enlarged with mildly reduced EF. Pacer / ICD wire is visualized in it. 3. Stress Test:  12/30/2014  Large inferior defect consistent with scar   No reversible ischemia   Dilated LV with severely reduced LV function   Non-diagnostic EKG response due to baseline abnormalities .      4. Cath: 1/12/2015  Severe LV systolic dysfunction with LVEF 30%. Basal to mid inferior wall akinesis. Short occlusion of large dominant Circumflex proximally with collaterals from right. Successful PCI using 2.75 X 18 and 2.75 X 38mm Xience [employer] CORBY resulting in STEFFI 3 flow and 0% residual stenosis. PCI of 75% large OM1 branch with 2.75mm X 8mm Xience [employer]pine CORBY dilated to 3mm.     The MCOT, echocardiogram, stress test, and coronary angiography/PCI were reviewed by myself and used for my plan of care. Procedures:  1. [employer]onic BiV ICD implanted 12/16/16 by Dr Brynn Guerra    Assessment/Plan:     Ischemic cardiomyopathy s/p BiV ICD (12/16/16)   -Device interrogation 4/17/19 showed 2 shocks on 4/16/19 at around 6pm and four ATP therapies.  All of these occurred 4/16/2019 for VT at

## 2019-04-24 NOTE — PROGRESS NOTES
Patient comes in for programming evaluation for his ICD. The device is functioning within normal parameters. No changes need to be made at this time. Pt presents today after recent shocks (multiple shocks on 4/16). Thoracic impedance indicates and increase in fluids. Multiple episodes recorded that same day, after shock occurred. Episodes recorded as FVT appear to be dual tachycardia and new onset AF. Ventricular ATP given during these events; all successful at slowing V rate. Dr. Amber Duval to review interrogation. See interrogation/Paceart report for further details. Patient is to see Dr. Amber Duval in office today also. Patient will follow up in 3 months in office or remotely.

## 2019-04-24 NOTE — PATIENT INSTRUCTIONS

## 2019-05-11 DIAGNOSIS — F32.4 MAJOR DEPRESSION SINGLE EPISODE, IN PARTIAL REMISSION (HCC): ICD-10-CM

## 2019-05-11 RX ORDER — VENLAFAXINE HYDROCHLORIDE 150 MG/1
CAPSULE, EXTENDED RELEASE ORAL
Qty: 90 CAPSULE | Refills: 0 | Status: SHIPPED | OUTPATIENT
Start: 2019-05-11 | End: 2019-01-01

## 2019-06-05 ENCOUNTER — TELEPHONE (OUTPATIENT)
Dept: FAMILY MEDICINE CLINIC | Age: 64
End: 2019-06-05

## 2019-06-05 DIAGNOSIS — M51.16 LUMBAR DISC DISEASE WITH RADICULOPATHY: ICD-10-CM

## 2019-06-05 DIAGNOSIS — M54.40 CHRONIC BILATERAL LOW BACK PAIN WITH SCIATICA, SCIATICA LATERALITY UNSPECIFIED: ICD-10-CM

## 2019-06-05 DIAGNOSIS — G89.29 CHRONIC BILATERAL LOW BACK PAIN WITH SCIATICA, SCIATICA LATERALITY UNSPECIFIED: ICD-10-CM

## 2019-06-05 RX ORDER — HYDROCODONE BITARTRATE AND ACETAMINOPHEN 5; 325 MG/1; MG/1
1 TABLET ORAL EVERY 6 HOURS PRN
Qty: 120 TABLET | Refills: 0 | Status: CANCELLED | OUTPATIENT
Start: 2019-06-05 | End: 2019-01-01

## 2019-06-05 RX ORDER — HYDROCODONE BITARTRATE AND ACETAMINOPHEN 5; 325 MG/1; MG/1
1 TABLET ORAL EVERY 6 HOURS PRN
Qty: 120 TABLET | Refills: 0 | Status: SHIPPED | OUTPATIENT
Start: 2019-06-05 | End: 2019-01-01 | Stop reason: SDUPTHER

## 2019-06-25 NOTE — PROGRESS NOTES
[address]   Daily Progress Note      Mr. Saulo Stevenson is 62 y.o. male with a past medical history significant for prior TI/CVA, Multiple Sclerosis, hypertension, CAD s/p PCI and chronic systolic CHF. I performed a left heart catheterization for him on 1/12/15 demonstrating severe LV systolic dysfunction and an occluded Circumflex. The Circumflex was stented with two CORBY resulting in STEFFI 3 flow. A 75% large OM1 lesion was also intervened upon with a CORBY. He has a residual 70% lesion in a large first diagonal branch of the LAD (bifurcating LAD really). He was started on medical therapy for his LVEF of 30%. I referred him to Dr. Ck Gibbs who put in an MRI compatible Medtronic BiVentricular pacemaker/AICD on 12/16/16. His LV function did not significantly improve when I repeated his echo so I started him on Entresto therapy. He returns to the office today in follow-up. Since we last saw Ean Estrada he reports that he is feeling \"okay\". He is currently taking a full dose of Entresto 24-26. He presents with his wife today in office. She states he has been sleeping most of the day. He reports he is able to sleep well at night. He has never been screened for sleep apnea. The most amount of exertion he participates in is playing golf one day per week. He denies any chest pain or awareness of his heart racing. He denies shortness of breath. Current Outpatient Medications   Medication Sig Dispense Refill    HYDROcodone-acetaminophen (NORCO) 5-325 MG per tablet Take 1 tablet by mouth every 6 hours as needed for Pain for up to 30 days.  120 tablet 0    venlafaxine (EFFEXOR XR) 150 MG extended release capsule TAKE 1 CAPSULE BY MOUTH EVERY DAY 90 capsule 0    vitamin B-12 (CYANOCOBALAMIN) 1000 MCG tablet Take 1,000 mcg by mouth daily      buPROPion (WELLBUTRIN SR) 150 MG extended release tablet TAKE 1 TABLET BY MOUTH TWICE DAILY 180 tablet 0    carvedilol (COREG) 3.125 MG tablet Take 1 tablet by mouth 2 times daily (with meals) 180 tablet 3    sacubitril-valsartan (ENTRESTO) 24-26 MG per tablet Take 1 tablet by mouth 2 times daily 180 tablet 4    atorvastatin (LIPITOR) 20 MG tablet TAKE 1 TABLET EVERY DAY 90 tablet 3    Cholecalciferol (VITAMIN D) 2000 UNITS CAPS capsule Take 1 capsule by mouth daily.  aspirin 81 MG EC tablet Take 81 mg by mouth daily. No current facility-administered medications for this visit. Objective:   /88   Pulse 78   Ht 5' 6\" (1.676 m)   Wt 210 lb (95.3 kg)   SpO2 98%   BMI 33.89 kg/m²    Physical Exam:  General:  Awake, alert, NAD  Skin:  Warm and dry  Neck:  JVD<8, no carotid bruits  Chest:  Clear to auscultation, no wheezes/rhonchi/rales  Cardiovascular:  RRR, normal S1/S2, no M/R/G  Abdomen:  Soft, nontender, +bowel sounds  Extremities:  No edema  Pulses: 2+ bilat carotid    2+ bilat radial, no hematoma at right radial cath site. 2+ bilat femoral      Lab Results   Component Value Date    CHOL 118 07/24/2018    HDL 25 07/24/2018    HDL 28 07/02/2010    TRIG 139 07/24/2018     ECG 06/2015: Sinus bradycardia with LBBB  ECG 8/24/16: Sinus bradycardia with frequent PVC's, LBBB      Procedures:     [facility] 1/12/15  Findings:   Severe LV systolic dysfunction with LVEF 30%. Basal to mid inferior wall akinesis. Short occlusion of large dominant Circumflex proximally with collaterals from right. Successful PCI using 2.75 X 18 and 2.75 X 38mm Xience Alpine CORBY resulting in STEFFI 3 flow and 0% residual stenosis. PCI of 75% large OM1 branch with 2.75mm X 8mm Xience Alpine CORBY dilated to 3mm. Imaging:     Echo 12/19/14  Normal LV size and systolic function: EF is   55%. Grade I diastolic  dysfunction. Trivial mitral, aortic and tricuspid regurgitation is present.     Stress perfusion 12/30/14  Large inferior defect consistent with scar   No reversible ischemia   Dilated LV with severely reduced LV function   Non-diagnostic EKG response due to baseline abnormalities .     Echo 8/29/16  Overall left ventricular systolic function is severely reduced. Ejection   fraction is visually estimated to be 25-30 %. There is severe diffuse   hypokinesis with regional variation including akinesis of the inferior wall.   Mild-to-moderately dilated left ventricle. Diastolic filling parameters   suggests grade II diastolic dysfunction.   Normal right ventricular size and function.   The left atrium appears mildly dilated.   Trivial mitral regurgitation. Echo 6/30/17  Moderately dilated LV with EF 20-25%; Distal septal wall thinning with  akinesis. Postero lateral and inferior akinesis as well. Mild mitral regurgitation is present. Mildly dilated left atrium. Trace aortic regurgitation is present. The RV is mildly enlarged with mildly reduced EF. Pacer / ICD wire is  visualized in it.     Echo 8/28/18  Global left ventricular function is moderate-to-severely decreased with  ejection fraction estimated to be 25 %. The left atrium is severely dilated. Normal right ventricular size and function. Pacer / ICD wire is visualized in the right ventricle.       Assessment:  1. CAD of native coronary arteries without angina, s/p PCI  2. Chronic Systolic CHF, class 3, s/p BiV AICD  3. Hyperlipidemia with goal LDL<70mg/dL  4. Left Bundle Branch Block  5. Short term memory losss      Plan:  Lauro Centeno seems to have some advanced dementia. This is causing near complete medication noncompliance which his wife confirms. She relates that he is sleeping all day. Lauro Centeno is not really able to recall any short term memory events. I should have suspected medication noncompliance as his LVEF has not improved despite aggressive medical therapy. He did not follow-up for the sleep study as I referred him. He seems to have developed significant dementia given his flat affect, forgetfulness and daytime somnolence. I told he and his wfie that he needs to see [facility] again for this.  I also discussed it with Dr. Ward Gonzáles. We will not make any acute changes to his medications right now given his noncompliance. He will likely need Home Health to aid in medication administration. I will see him back in office for follow up in 6 months. This note was scribed in the presence of Kvng Canales MD by [employer], RN. Physician Attestation:  The scribes documentation has been prepared under my direction and personally reviewed by me in its entirety. I, Dr. Boris Floyd personally performed the services described in this documentation as scribed by my RN,  [employer] in my presence, and I confirm that the note above accurately reflects all work, treatment, procedures, and medical decision making performed by me.

## 2019-06-26 NOTE — PATIENT INSTRUCTIONS
help certain people lower their risk of a heart attack or stroke. But taking aspirin isn't right for everyone, because it can cause serious bleeding. Do not start taking daily aspirin unless your doctor knows about it. How is coronary artery disease treated? · Your doctor will suggest that you make lifestyle changes. For example, your doctor may ask you to eat healthy foods, quit smoking, lose extra weight, and be more active. · You will have to take medicines. · Your doctor may suggest a procedure to open narrowed or blocked arteries. This is called angioplasty. Or your doctor may suggest using healthy blood vessels to create detours around narrowed or blocked arteries. This is called bypass surgery. Follow-up care is a key part of your treatment and safety. Be sure to make and go to all appointments, and call your doctor if you are having problems. It's also a good idea to know your test results and keep a list of the medicines you take. Where can you learn more? Go to https://RupeeTimes.VerticalResponse. org and sign in to your [employer] account. Enter [phone] in the [employer] box to learn more about \"Learning About Coronary Artery Disease (CAD). \"     If you do not have an account, please click on the \"Sign Up Now\" link. Current as of: July 22, 2018  Content Version: 12.0  © 6841-4914 Healthwise, Incorporated. Care instructions adapted under license by [facility] ([facility]). If you have questions about a medical condition or this instruction, always ask your healthcare professional. [place] [address] any warranty or liability for your use of this information. Patient Education        Sleep Apnea: Care Instructions  Your Care Instructions    Sleep apnea means that you frequently stop breathing for 10 seconds or longer during sleep. It can be mild to severe, based on the number of times an hour that you stop breathing or have slowed breathing.   Blocked or narrowed airways in your in and another type for breathing out. Another device raises or lowers air pressure as needed while you breathe. · Talk to your doctor if:  ? Your nose feels dry or bleeds when you use one of these machines. You may need to increase moisture in the air. A humidifier may help. ? Your nose is runny or stuffy from using a breathing machine. Decongestants or a corticosteroid nasal spray may help. ? You are sleepy during the day and it gets in the way of the normal things you do. Do not drive when you are drowsy. When should you call for help? Watch closely for changes in your health, and be sure to contact your doctor if:    · You still have sleep apnea even though you have made lifestyle changes.     · You are thinking of trying a device such as CPAP.     · You are having problems using a CPAP or similar machine. Where can you learn more? Go to https://Nanotionpedenieweb.[employer]. org and sign in to your [employer] account. Enter F688 in the [employer] box to learn more about \"Sleep Apnea: Care Instructions. \"     If you do not have an account, please click on the \"Sign Up Now\" link. Current as of: September 5, 2018  Content Version: 12.0  © 1142-7163 Healthwise, Incorporated. Care instructions adapted under license by [facility] ([facility]). If you have questions about a medical condition or this instruction, always ask your healthcare professional. [place] [address] any warranty or liability for your use of this information.

## 2019-07-23 NOTE — PROGRESS NOTES
138/87 (Site: Right Upper Arm, Position: Sitting, Cuff Size: Large Adult)   Pulse 76   Ht 5' 6\" (1.676 m)   Wt 214 lb 12.8 oz (97.4 kg)   BMI 34.67 kg/m²    Objective:   Physical Exam   Constitutional: He is oriented to person, place, and time. He appears well-developed and well-nourished. No distress. HENT:   Head: Normocephalic. Right Ear: External ear normal.   Left Ear: External ear normal.   Mouth/Throat: Oropharynx is clear and moist. No oropharyngeal exudate. Neck: No JVD present. No thyromegaly present. Cardiovascular: Normal rate, regular rhythm, normal heart sounds and intact distal pulses. No murmur heard. Pulmonary/Chest: Effort normal and breath sounds normal. He has no wheezes. He has no rales. Musculoskeletal: He exhibits no edema. Lymphadenopathy:     He has no cervical adenopathy. Neurological: He is alert and oriented to person, place, and time. Assessment:      Hypertension  Hyperlipidemia  Depression  Chronic Low Back Pain  Congestive Heart Failure  Cardiomyopathy   Multiple Sclerosis  Erectile Dysfunction       Plan:       Chem 7, Lipid Panel, AST, ALT  Stop the Effexor and Wellbutrin and Coreg  Resume other medications. OARRS report was reviewed  Medications refilled   RTO 3 months for Chronic Low Back Pain     Controlled Substance Monitoring:    Acute and Chronic Pain Monitoring:   RX Monitoring 7/24/2019   Attestation -   Acute Pain Prescriptions Severe pain not adequately treated with lower dose. Periodic Controlled Substance Monitoring No signs of potential drug abuse or diversion identified.    Chronic Pain > 80 MEDD -

## 2019-08-10 PROBLEM — K81.0 ACUTE CHOLECYSTITIS: Status: ACTIVE | Noted: 2019-01-01

## 2019-08-10 NOTE — ED PROVIDER NOTES
[address]   eMERGENCY dEPARTMENT eNCOUnter      Pt Name: Angelina Yoo  MRN: [id number]  [place] 1955  Date of evaluation: 8/10/2019  Provider: Mary Holm MD    CHIEF COMPLAINT       Chief Complaint   Patient presents with    Abdominal Pain     generalized abd x 1 week. emesis yesterday. denies diarrhea or constipation. HISTORY OF PRESENT ILLNESS   (Location/Symptom, Timing/Onset,Context/Setting, Quality, Duration, Modifying Factors, Severity)  Note limiting factors. Angelina Yoo is a 75 y.o. male who presents to the emergency department end of abdominal pain for the past week. Is been slowly progressing. He states it is a generalized abdominal pain but does tend to be a little bit worse in his epigastric area. He said some associated nausea with it yesterday had vomiting. He denies any blood or bile in his vomit. No blood in his stool. No fever chills. No UTI symptoms. No chest pain. No shortness of breath. No prior episodes of abdominal pain. No prior abdominal surgeries that he endorses. He seems to make his pain better or worse. It does not radiate. It involves his entire abdomen. HPI    NursingNotes were reviewed. REVIEW OF SYSTEMS    (2-9 systems for level 4, 10 or more for level 5)     Review of Systems   Constitutional: Negative for chills and fever. HENT: Negative for rhinorrhea and sore throat. Eyes: Negative for redness. Respiratory: Negative for shortness of breath. Cardiovascular: Negative for chest pain. Gastrointestinal: Positive for abdominal pain, nausea and vomiting. Negative for constipation and diarrhea. Genitourinary: Negative for flank pain. Neurological: Negative for headaches. Hematological: Negative for adenopathy. Psychiatric/Behavioral: Negative for confusion. Except as noted above the remainder of the review of systems was reviewed and negative.        PAST MEDICAL HISTORY     Past Medical History: highly suspicious for acute cholecystitis. HIDA scan could be   performed for confirmation. ED BEDSIDE ULTRASOUND:   Performed by ED Physician - none    LABS:  Labs Reviewed   CBC WITH AUTO DIFFERENTIAL   COMPREHENSIVE METABOLIC PANEL W/ REFLEX TO MG FOR LOW K   LIPASE   URINE RT REFLEX TO CULTURE   TROPONIN   LACTIC ACID, PLASMA   LACTIC ACID, PLASMA       All other labs were within normal range or not returned as of this dictation. EMERGENCY DEPARTMENT COURSE and DIFFERENTIAL DIAGNOSIS/MDM:   Vitals:    Vitals:    08/11/19 0100 08/11/19 0510 08/11/19 0518 08/11/19 0744   BP:  105/69  (!) 92/50   Pulse:  83  68   Resp:  18  16   Temp:  98.3 °F (36.8 °C)  98.1 °F (36.7 °C)   TempSrc:  Oral  Oral   SpO2:  96%  96%   Weight:   212 lb 8 oz (96.4 kg)    Height: 5' 6\" (1.676 m)              MDM  Number of Diagnoses or Management Options  Diagnosis management comments: DDX: Pancreatitis, biliary disease, colitis, obstruction, appendicitis, abscess, intestinal ischemia, typical presentation of myocardial infarction, other        CRITICAL CARE TIME   Total Critical Care time was 0 minutes, excluding separately reportable procedures. There was a high probability of clinically significant/life threatening deterioration in the patient's condition which required my urgent intervention. CONSULTS:  Kelly Lujan CONSULT TO HOSPITALIST    PROCEDURES:  Unless otherwise noted below, none     Procedures    FINAL IMPRESSION      1.  Acute cholecystitis          DISPOSITION/PLAN   DISPOSITION Admitted 08/10/2019 11:30:59 PM      PATIENT REFERRED TO:  Alejandro Hamilton DO  [address]  [phone]            DISCHARGE MEDICATIONS:  Current Discharge Medication List             (Please note that portions of this note were completed with a voice recognition program.Efforts were made to edit the dictations but occasionally words are mis-transcribed.)    Davis Ortiz MD (electronically signed)  Attending Emergency Physician          Davis Ortiz MD  08/15/19 1575

## 2019-08-11 NOTE — ED PROVIDER NOTES
[address]  [address] [address]  Dept: [phone]  Loc: [address] ENCOUNTER        This patient was not seen or evaluated by the attending physician. I evaluated this patient, the attending physician was available for consultation. CHIEF COMPLAINT    Chief Complaint   Patient presents with    Abdominal Pain     generalized abd x 1 week. emesis yesterday. denies diarrhea or constipation. HPI    Angelina Yoo is a 75 y.o. male who presents to the emergency department today complaining of RUQ abdominal pain. Onset was 1 week ago. Symptoms started spontaneously and have been constant. No exacerbating or alleviating factors. He has had some intermittent emesis. He has nausea. Normal bowels. No chest pain or shortness of breath. No fever or chills. REVIEW OF SYSTEMS    GI: see HPI, + vomiting, no diarrhea, no hematochezia  Cardiac: No chest pain, shortness of breath, palpitations or syncope  Pulmonary: No difficulty breathing or new cough  General: No fevers  : No dysuria, No hematuria  See HPI for further details. All other systems reviewed and are negative.     PAST MEDICAL & SURGICAL HISTORY    Past Medical History:   Diagnosis Date    CHF with left ventricular diastolic dysfunction, NYHA class 2 (HCC)     Chronic midline low back pain without sciatica     Coronary artery disease involving native coronary artery of native heart without angina pectoris 1-14-15    Drug Eluting Stents x 3 / Dr. Casi Meza Erectile dysfunction of non-organic origin     Essential hypertension     Hem[facility] ([facility]     Hypercholesterolemia     Ischemic cardiomyopathy     Dr. Joel Cunningham / Implanted ICD    Lumbar disc disease with radiculopathy     laminectomy dr Neita Galeazzi  1/2014    Major depression single episode, in partial remission ([facility]     MS (multiple sclerosis) ([facility]      and interpreted. (See chart for details)     See chart for details of medications given during the ED stay. Vitals:    08/10/19 2101 08/10/19 2116 08/10/19 2131 08/10/19 2300   BP: (!) 145/63 136/66 130/60 (!) 114/48   Pulse: 93 85 83 86   Resp: 24 26 24 20   Temp:    98.4 °F (36.9 °C)   TempSrc:       SpO2: 100%  100% 100%   Weight:           Differential diagnosis: Abdominal Aortic Aneurysm, Acute Coronary Syndrome, Ischemic Bowel, Bowel Obstruction (including Gastric Outlet Obstruction), PUD, GERD, Acute Cholecystitis, Pancreatitis, Hepatitis, Colitis, SMA Syndrome, Mesenteric Steal Syndrome, Splanchnic Vein Thrombosis, Appendicitis, Diverticulitis, Pyelonephritis, UTI, STD, Gonad Torsion, other    Patient seen and examined today for abdominal pain. See HPI for patient presentation. Patient is hemodynamically stable, nontoxic, afebrile, and without tachycardia, tachypnea, and hypoxia. Physical exam as above. Obese 66-year-old male lying in bed in no acute distress. RUQ tenderness. Abdomen soft without rigidity or guarding. CT shows acute cholecystitis. Leukocytosis with a white count of 11.9 and left shift of 9.8 with no bandemia. Urine shows no infection or blood. Kidney and liver function normal.  Lactic acid normal.    Patient given Zosyn, fluids, and pain and nausea medication. He was resting comfortably. He will be admitted for further work-up and treatment. He was given all test results and given an opportunity to ask and have any questions answered. He was agreeable to admission. I consulted with general surgery, Dr. Clinton Sims, who advised he would see in the morning. I consulted with the hospitalist, Dr. Sacha Siegel, who agreed to admit patient and write orders. The patient tolerated their visit well. They were seen and evaluated by the attending physician, Guido Lew* who agreed with the assessment and plan.   The patient and / or the family were informed of the results of any

## 2019-08-11 NOTE — ED TRIAGE NOTES
Pt has had abdominal pain oriented to the left that he describes as unrelenting and rates a 7/10. Abdomen is soft and tender to palpation.

## 2019-08-11 NOTE — CONSULTS
[facility]  Cardiology Consult    Jayant Melgar  1955 August 11, 2019        CC: Abdominal pain      Subjective:     History of Present Illness:    Jayant Melgar is a 75 y.o. patient with a PMH significant for coronary disease, ischemic cardia myopathy, chronic systolic heart failure presented with complaints of abdominal pain  Pain started a week ago. Right upper quadrant sharp severe in nature. At least 8/10 in severity. Nonradiating. Associated with nausea no vomiting. No diarrhea no hematemesis or melanotic stools. No aggravating factors and relieved by IV analgesics. Patient denies any symptoms of chest pain, pressure, tightness, PARRY, shortness of breath at rest,  edema, open wounds/sores, weight loss, weight gain, near syncope, syncope, heart racing, palpitations, dizziness, lightheaded, PND, orthopnea, bilateral lower extremities pain, cramping or fatigue. Past Medical History:   has a past medical history of CHF with left ventricular diastolic dysfunction, NYHA class 2 ([address] Chronic midline low back pain without sciatica, Coronary artery disease involving native coronary artery of native heart without angina pectoris, Erectile dysfunction of non-organic origin, Essential hypertension, Hemorrhagic stroke ([address] Hypercholesterolemia, Ischemic cardiomyopathy, Lumbar disc disease with radiculopathy, Major depression single episode, in partial remission ([address] MS (multiple sclerosis) ([address] Non morbid obesity due to excess calories, Rotator cuff tear, and TIA (transient ischemic attack). Surgical History:   has a past surgical history that includes Rotator cuff repair; tumor removal; lumbar laminectomy (jan 2014); Coronary angioplasty with stent (1-14-15); and Cardiac defibrillator placement (12/2016). Social History:   reports that he quit smoking about 25 years ago. He has quit using smokeless tobacco. He reports that he drinks alcohol.  He reports that he does not use

## 2019-08-11 NOTE — PROGRESS NOTES
Patient admitted to room 3129 from ER via stretcher. Oriented to room, call light, and floor policies. Plan of care reviewed with patient. Pt is resting in bed and not c/o pain at this time; no s/s of distress noted. Assessment completed; VSS. Tele in place reading AV paced rhythm. Safety precautions in place; call light and bedside table within reach. Pt encouraged to call for needs or ambulation. Pt VU. Will continue to monitor.

## 2019-08-11 NOTE — ED NOTES
Report called to [facility].   Denies pain     Aurelia Nageotte, FELIPE  08/11/19 0010

## 2019-08-11 NOTE — H&P
midline. Respiratory:  Normal respiratory effort. Clear to auscultation, bilaterally without Rales/Wheezes/Rhonchi. Cardiovascular:  Regular rate and rhythm with normal S1/S2 without murmurs, rubs or gallops. Abdomen: Soft, positive right upper quadrant pain  Musculoskeletal:  No clubbing, cyanosis or edema bilaterally. Full range of motion without deformity. Skin: Skin color, texture, turgor normal.  No rashes or lesions. Neurologic:  Neurovascularly intact without any focal sensory/motor deficits. Cranial nerves: II-XII intact, grossly non-focal.  Psychiatric:  Alert and oriented, thought content appropriate, normal insight  Capillary Refill: Brisk,< 3 seconds   Peripheral Pulses: +2 palpable, equal bilaterally       Labs:     Recent Labs     08/11/19  0626   WBC 8.1   HGB 12.7*   HCT 38.0*        Recent Labs     08/11/19  0626      K 3.8      CO2 24   BUN 16   CREATININE 1.1   CALCIUM 8.9     Recent Labs     08/11/19  0626   AST 11*   ALT 8*   BILITOT 1.0   ALKPHOS 60     No results for input(s): INR in the last 72 hours. Recent Labs     08/11/19  0208 08/11/19  0626 08/11/19  1336   TROPONINI 0.08* 0.08* 0.08*       Urinalysis:      Lab Results   Component Value Date    NITRU Negative 08/10/2019    WBCUA 0-2 08/10/2019    RBCUA 0-2 08/10/2019    BLOODU TRACE-INTACT 08/10/2019    SPECGRAV 1.020 08/10/2019    GLUCOSEU Negative 08/10/2019       Radiology:     CXR: I have reviewed the CXR with the following interpretation:   EKG:  I have reviewed the EKG with the following interpretation: Ventricularly paced rhythm    CT ABDOMEN PELVIS WO CONTRAST Additional Contrast? None   Final Result   Findings are highly suspicious for acute cholecystitis. HIDA scan could be   performed for confirmation.              ASSESSMENT:    Acute cholecystitis    Chronic systolic heart failure with an EF of 25% with AICD/pacemaker in situ patient reports he is not currently taking his Lelon Embs will not

## 2019-08-11 NOTE — CONSULTS
mg, Subcutaneous, Daily, Schuyler Webb MD, 40 mg at 08/11/19 0920    metronidazole (FLAGYL) 500 mg in NaCl 100 mL IVPB premix, 500 mg, Intravenous, Q8H, Schuyler Webb MD, Stopped at 08/11/19 1030    cefTRIAXone (ROCEPHIN) 1 g IVPB in 50 mL D5W minibag, 1 g, Intravenous, Q24H, Jordon Fry MD, Stopped at 08/11/19 1000    aspirin chewable tablet 81 mg, 81 mg, Oral, Daily, Jordon Fry MD    traMADol Jose Hemlock) tablet 50 mg, 50 mg, Oral, Q6H PRN, Jordon Fry MD  No Known Allergies  family history includes Diabetes in an other family member; High Blood Pressure in his father; Stroke in his father. Fam Hx: no relevant fam Hx    REVIEW OF SYSTEMS:    Pertinent positives and negatives are mentioned in the HPI. Otherwise, all other systems were reviewed and negative. PHYSICAL EXAM:  /87   Pulse 67   Temp 97.2 °F (36.2 °C) (Oral)   Resp 16   Ht 5' 6\" (1.676 m)   Wt 212 lb 8 oz (96.4 kg)   SpO2 96%   BMI 34.30 kg/m²   Constitutional: Appears well-developed and well-nourished. Grooming appropriate. No gross deformities. Body mass index is 34.3 kg/m². Eyes: No scleral icterus. Conjunctiva/lids normal. Vision intact grossly. Pupils equal/symmetric, reactive bilaterally. ENT: External ears/nose without defect, scars, or masses. Hearing grossly intact. No facial deformity. Lips normal, normal dentition. Neck: No masses. Trachea midline. No crepitus. Thyroid not enlarged. Cardiovascular: Normal rate. No peripheral edema. Abdominal aorta normal size to palpation. Pulmonary/Chest: Effort normal. No respiratory distress. No wheezes. No use of accessory muscles. Musculoskeletal: Normal range of motion x all 4 extremities and head/neck, without deformity, pain, or crepitus, with normal strength/tone. Nails without clubbing/cyanosis. Neurological: Alert and oriented to person, place, and time. No gross deficits. Sensation intact grossly. Skin: Skin is dry. No rashes noted. No pallor.  No induration or nodules. Psychiatric: Normal mood and affect. Behavior normal. Oriented to person, place, and time. Judgment and insight reasonable.     Abdominal/wound: Mild to moderate tenderness RUQ    MDM:  Review/order labs: reviewed CBC, CMP  Review/order radiology: reviewed CT scan  Review/order other tests: none  Discussion of tests w/ performing: none  Old records/other history provider: none  Coordination/discussion w/ other providers: d/w IM physician, ED PA  Independent interpretation of: CT scan - acute cholecystitis    Eyad Maria MD

## 2019-08-11 NOTE — PROGRESS NOTES
Recent Labs     08/11/19  0626      K 3.8      CO2 24   BUN 16   CREATININE 1.1     Mag: No results for input(s): MAG in the last 72 hours. Phos:   Lab Results   Component Value Date    PHOS 2.8 07/10/2012     No components found for: GLU    LIVER PROFILE:   Recent Labs     08/11/19  0626   AST 11*   ALT 8*   LIPASE 21.0   BILITOT 1.0   ALKPHOS 60     PT/INR: No results for input(s): PROTIME, INR in the last 72 hours. APTT: No results for input(s): APTT in the last 72 hours. UA:  Recent Labs     08/10/19  2005   COLORU Yellow   PHUR 5.5   LABCAST 0-1 Hyaline*   WBCUA 0-2   RBCUA 0-2   CLARITYU Clear   SPECGRAV 1.020   LEUKOCYTESUR Negative   UROBILINOGEN 0.2   BILIRUBINUR Negative   BLOODU TRACE-INTACT*   GLUCOSEU Negative       Invalid input(s): ABG  Lab Results   Component Value Date    CALCIUM 8.9 08/11/2019    PHOS 2.8 07/10/2012       Assessment and Plan:    Acute cholecystitis:   Change antibiotics from Cipro to ceftriaxone for better E. coli coverage. Continue with Flagyl. General surgery planning on surgery tomorrow if it is okay with cardiology. Elevated troponin  Patient denies any chest pain. EKG was paced rhythm. His elevated troponin could be related to his infection? Since there is a possibility he will and will go surgery tomorrow we will ask cardiology to see him to help with preop clearance in the setting of elevated troponin. We will trend troponin    Chronic Systolic heart failure: With history of biventricular AICD   He looks compensated. DC IV fluid  Start fluid restriction  Patient used to be on Entresto but that was stopped recently secondary to dizziness. Patient used to be on carvedilol but that was stopped secondary to dizziness. Cardiology consulted      Depression:  Patient used to be on Effexor and Wellbutrin.   They were both stopped secondary to lethargy   His mood was well today/  I will hold on restarting any for now/      History of coronary artery

## 2019-08-11 NOTE — PROGRESS NOTES
Pt resting in bed this evening, was cleared for surgery by cardiology. Pt wife at bedside, wants to be notified of time of surgery so she can be present. Pt tolerating clear liquids, NPO at midnight for cholecystectomy tomorrow. Pt and wife aware. Will contact MD for approx time of procedure. No other questions or concerns at this time. Will monitor.  Electronically signed by Wendy Domínguez RN on 8/11/2019 at 5:26 PM

## 2019-08-12 NOTE — PROGRESS NOTES
Patient A&O. Fernie is currently NPO. Patient denies pain at this time. Call light within reach. Will continue to monitor and reassess.  Electronically signed by Mahsa Watt RN on 8/12/2019

## 2019-08-12 NOTE — PROGRESS NOTES
Pt awake at this time in bed. Pt walking from bed to bathroom and tolerating well. PRN pain medication for pain a 6/10. Shift assessment completed. Pt denies any needs at this time and verbalized he will call out if he needs anything. Will continue to monitor.

## 2019-08-12 NOTE — PLAN OF CARE
Problem: Falls - Risk of:  Goal: Will remain free from falls  Description  Will remain free from falls  Outcome: Ongoing  Note:   Fall risk assessment completed. Fall precautions in place. Call light within reach. Pt educated on calling for assistance before getting up. Walkway free of clutter. Will continue to monitor. Problem: Falls - Risk of:  Goal: Absence of physical injury  Description  Absence of physical injury  Outcome: Ongoing  Note:   Pt is free of injury. No injury noted. Fall precautions in place. Call light within reach. Will monitor. Problem: Pain:  Goal: Pain level will decrease  Description  Pain level will decrease  Outcome: Ongoing  Note:   Pain/discomfort being managed with PRN analgesics per MD orders. Pt able to express presence and absence of pain and rate pain appropriately using numerical scale.

## 2019-08-12 NOTE — PROGRESS NOTES
15   CREATININE  --    < > 1.1 1.0   CALCIUM  --    < > 8.9 9.3   AST  --   --  11*  --    ALT  --   --  8*  --    BILITOT  --   --  1.0  --    NITRU Negative  --   --   --    COLORU Yellow  --   --   --     < > = values in this interval not displayed. CBC:   Lab Results   Component Value Date    WBC 6.6 08/12/2019    RBC 4.57 08/12/2019    HGB 14.2 08/12/2019    HCT 41.5 08/12/2019    MCV 90.8 08/12/2019    MCH 31.0 08/12/2019    MCHC 34.1 08/12/2019    RDW 13.2 08/12/2019     08/12/2019    MPV 7.8 08/12/2019     CMP:    Lab Results   Component Value Date     08/12/2019    K 4.0 08/12/2019     08/12/2019    CO2 21 08/12/2019    BUN 15 08/12/2019    CREATININE 1.0 08/12/2019    GFRAA >60 08/12/2019    GFRAA >60 07/10/2012    AGRATIO 1.4 08/11/2019    LABGLOM >60 08/12/2019    GLUCOSE 104 08/12/2019    PROT 5.7 08/11/2019    PROT 6.6 10/31/2011    LABALBU 3.3 08/11/2019    CALCIUM 9.3 08/12/2019    BILITOT 1.0 08/11/2019    ALKPHOS 60 08/11/2019    AST 11 08/11/2019    ALT 8 08/11/2019         Serene Lyles  Electronically signed 8/12/2019 at 10:35 AM      Surgery Staff  I have examined this patient and read and agree with the note by Umair Cheek PA-C from today. Denies much pain. Tolerating clears    Abdomen soft, mildly tender RUQ  Trop rising    Acute cholecystitis. Improving with abx. D/W Dr. Channing Urbina. Given possibility of NSTEMI, no plans for lap cholecystectomy during this admission  Low fat diet as able and monitor. Consider elective cholecystectomy as outpatient if medically cleared.   D/W Wife  Electronically signed by Ministerio Miramontes MD on 8/12/2019 at 1:52 PM

## 2019-08-13 NOTE — PROGRESS NOTES
08/11/19  0626 08/12/19  0751 08/13/19  0630   WBC  --    < > 8.1 6.6 6.9   HGB  --    < > 12.7* 14.2 12.5*   HCT  --    < > 38.0* 41.5 37.3*   PLT  --    < > 211 235 217   NA  --    < > 141 138  --    K  --    < > 3.8 4.0  --    CL  --    < > 104 101  --    CO2  --    < > 24 21  --    BUN  --    < > 16 15  --    CREATININE  --    < > 1.1 1.0  --    CALCIUM  --    < > 8.9 9.3  --    AST  --   --  11*  --   --    ALT  --   --  8*  --   --    BILITOT  --   --  1.0  --   --    NITRU Negative  --   --   --   --    COLORU Yellow  --   --   --   --     < > = values in this interval not displayed. CBC:   Lab Results   Component Value Date    WBC 6.9 08/13/2019    RBC 4.14 08/13/2019    HGB 12.5 08/13/2019    HCT 37.3 08/13/2019    MCV 90.1 08/13/2019    MCH 30.2 08/13/2019    MCHC 33.5 08/13/2019    RDW 12.9 08/13/2019     08/13/2019    MPV 8.0 08/13/2019     CMP:    Lab Results   Component Value Date     08/12/2019    K 4.0 08/12/2019     08/12/2019    CO2 21 08/12/2019    BUN 15 08/12/2019    CREATININE 1.0 08/12/2019    GFRAA >60 08/12/2019    GFRAA >60 07/10/2012    AGRATIO 1.4 08/11/2019    LABGLOM >60 08/12/2019    GLUCOSE 104 08/12/2019    PROT 5.7 08/11/2019    PROT 6.6 10/31/2011    LABALBU 3.3 08/11/2019    CALCIUM 9.3 08/12/2019    BILITOT 1.0 08/11/2019    ALKPHOS 60 08/11/2019    AST 11 08/11/2019    ALT 8 08/11/2019         Yolis Munoz PA-C  Electronically signed 8/13/2019 at 6:57 AM      Surgery Staff  I have examined this patient and read and agree with the note by Salome Vega PA-C from today. Denies pain but c/o mild nausea. Abdomen nontender. Fullness appreciated RUQ  Labs ok    Acute cholecystitis - improving with abx. Continue conservative care. Cardiology recs noted. No plan for intervention at this point.   Will need cleared at some point for laparoscopic cholecystectomy 4-6 weeks from now  Low fat diet as tolerated  Possible D/C tomorrow from surgical

## 2019-08-13 NOTE — PROGRESS NOTES
Physical Therapy    Facility/Department: [address] [facility]  Initial Assessment    NAME: Haylee Manning  : 1955  MRN: [id number]    Date of Service: 2019    Discharge Recommendations:  Home with assist PRN   PT Equipment Recommendations  Equipment Needed: Adelaida Manning scored a 19/24 on the AM-PAC short mobility form. At this time, no further PT is recommended upon discharge. Recommend patient returns to prior setting with prior services. Assessment   Body structures, Functions, Activity limitations: Decreased functional mobility   Assessment: pt is a 74 yo male who was adm to hosp with abdominal pain/cholecystitis and found to have elevated troponisn/possible NSTEMI; pt has MS and appears close to his baseline; he has good family support and wife can assist as needed; feel pt will be safe to return home at discharge with family assist; no further therapy indicated; will continue to see while in hosp to decrease secondary effects of immobility  Prognosis: Good  Decision Making: Low Complexity  PT Education: PT Role;General Safety  Barriers to Learning: slighlty delayed processing  REQUIRES PT FOLLOW UP: Yes  Activity Tolerance  Activity Tolerance: Patient Tolerated treatment well       Patient Diagnosis(es): The encounter diagnosis was Acute cholecystitis. has a past medical history of CHF with left ventricular diastolic dysfunction, NYHA class 2 (HCC), Chronic midline low back pain without sciatica, Coronary artery disease involving native coronary artery of native heart without angina pectoris, Erectile dysfunction of non-organic origin, Essential hypertension, Hemorrhagic stroke ([address] Hypercholesterolemia, Ischemic cardiomyopathy, Lumbar disc disease with radiculopathy, Major depression single episode, in partial remission ([address] MS (multiple sclerosis) ([address] Non morbid obesity due to excess calories, Rotator cuff tear, and TIA (transient ischemic attack).    has a past surgical history

## 2019-08-14 NOTE — DISCHARGE SUMMARY
Hospitalist Discharge Summary    Patient ID:  Wild Vu  [id number]  75 y.o.  1955    Admit date: 8/10/2019    Discharge date: 8/14/2019    Disposition: home    Admission Diagnoses:   Patient Active Problem List   Diagnosis    TIA (transient ischemic attack)    [facility]    MS [facility] sclerosis) [facility]    Rotator cuff tear    Lumbar disc disease with radiculopathy    CHF with left ventricular diastolic dysfunction, NYHA class 2 [facility]    Ischemic cardiomyopathy    Essential hypertension    Non morbid obesity due to excess calories    Hypercholesterolemia    Chronic midline low back pain without sciatica    Coronary artery disease involving native coronary artery of native heart without angina pectoris    Major depression single episode, in partial remission [facility]    Biventricular automatic implantable cardioverter defibrillator in situ    Cardiomyopathy ([facility]    Heart failure, chronic systolic ([facility]    ICD (implantable cardioverter-defibrillator), biventricular, in situ    Erectile dysfunction of non-organic origin    SAMANTHA (obstructive sleep apnea)    Acute cholecystitis    Elevated troponin       Discharge Diagnoses: Active Problems:    Acute cholecystitis    Elevated troponin  Resolved Problems:    Preoperative cardiovascular examination      Code Status:  Prior    Condition:  Stable    Discharge Diet: Diet:  No diet orders on file    PCP to do list:      Hospital Course:     Patient presented to the hospital with abdominal pain, he was found to have acute cholecystitis. He was seen by general surgery and he was supposed to go to surgery but his troponin  increased significantly. Surgery was canceled per cardiology recommendation. Patient will be discharged to follow-up with his cardiologist as an outpatient. Patient was treated with antibiotics , and  his symptoms improved.     Acute cholecystitis:   Antibiotics as an outpatient  Cholecystectomy canceled  secondary to elevated troponin. Low-fat diet  Follow-up with general surgery as an outpatient for possible elective cholecystectomy if he gets cleared by his cardiologist.         Elevated troponin  Patient denies any chest pain. EKG was paced rhythm. His elevated troponin could be related to his infection? Discussed with cardiology no need to repeat echocardiogram per dr Carrizales Devoid heart failure: With history of biventricular AICD  He looks compensated. Resume Entresto and carvedilol        Depression:  Patient used to be on Effexor and Wellbutrin.   They were both stopped secondary to [place] is stable        History of coronary artery disease with a PCI:  Continue with aspirin  Continue with beta-blocker         Discharge Medications:   Discharge Medication List as of 8/14/2019 11:11 AM      START taking these medications    Details   carvedilol (COREG) 6.25 MG tablet Take 1 tablet by mouth 2 times daily (with meals), Disp-60 tablet, R-3Normal      cefdinir (OMNICEF) 300 MG capsule Take 1 capsule by mouth 2 times daily for 10 days, Disp-20 capsule, R-0Normal      metroNIDAZOLE (FLAGYL) 500 MG tablet Take 1 tablet by mouth 3 times daily for 10 days, Disp-30 tablet, R-0Normal      Lactobacillus (PROBIOTIC ACIDOPHILUS) TABS Take 1 tablet by mouth daily for 15 days, Disp-30 tablet, R-0Normal           Discharge Medication List as of 8/14/2019 11:11 AM        Discharge Medication List as of 8/14/2019 11:11 AM      CONTINUE these medications which have NOT CHANGED    Details   atorvastatin (LIPITOR) 20 MG tablet TAKE 1 TABLET EVERY DAY, Disp-90 tablet, R-1Waiting on MailorderNormal      vitamin B-12 (CYANOCOBALAMIN) 1000 MCG tablet Take 1,000 mcg by mouth dailyHistorical Med      sacubitril-valsartan (ENTRESTO) 24-26 MG per tablet Take 1 tablet by mouth 2 times daily, Disp-180 tablet, R-4Normal      Cholecalciferol (VITAMIN D) 2000 UNITS CAPS capsule Albumin/Globulin Ratio 1.3 1.1 - 2.2    Total Bilirubin 0.6 0.0 - 1.0 mg/dL    Alkaline Phosphatase 96 40 - 129 U/L    ALT 19 10 - 40 U/L    AST 16 15 - 37 U/L    Globulin 2.7 g/dL         Follow up: with RAMIRO OLSEN [address] [place] [address], DO    Note that over 30 minutes was spent in preparing discharge papers, discussing discharge with patient, medication review, etc.    Thank you [phone] Let[address] Sylvester DO for the opportunity to be involved in this patient's care. If you have any questions or concerns please feel free to contact me at [phone].     Electronically signed by Guy Paz MD on 8/14/2019 at 2:16 PM

## 2019-08-14 NOTE — PROGRESS NOTES
Shift assessment completed. VSS. Nighttime medications administered. Patient lying in bed at this time. Bed in lowest position and locked. Call light within reach. Will continue to monitor.

## 2019-08-14 NOTE — PROGRESS NOTES
General and Vascular Surgery                                                           Daily Progress Note                                                             Doug Fry PA-C     Pt Name: Roge Peterson Record Number: [id number]  Date of Birth 1955   Today's Date: 8/14/2019    Chief Complaint: abdominal pain    ASSESSMENT  1. Cholecystitis  2. +Elevated troponin levels 8/12/19, worrisome for NSTEMI. (Cardiology was questioning if this was related to his infection)    3. Cholecystectomy on hold until cardiac issues ruled out. 4. Given possibility of NSTEMI, no plans for lap cholecystectomy during this admission. Low fat diet as able and monitor. 5. Consider elective cholecystectomy as outpatient if medically cleared  6. WBC count 7.3  7. Denies having any CP, or abdominal pain this AM  8. Tachycardic  9. HTN  10. Will continue to follow along  11. Monitor and control any pain  12. OK to D/C home from a surgical standpoint and follow up to discuss possible elective cholecystectomy     EDUCATION  Patient educated about their illness/diagnosis, stated above, and all questions answered. We discussed the importance of nutrition, medications they are taking, and healthy lifestyle. SUBJECTIVE  Trino has improved from yesterday. Pain is well controlled. He has no nausea and no vomiting. OBJECTIVE  VITALS:  height is 5' 6\" (1.676 m) and weight is 212 lb 8.2 oz (96.4 kg). His oral temperature is 97.6 °F (36.4 °C). His blood pressure is 116/71 and his pulse is 66. His respiration is 14 and oxygen saturation is 99%.  VITALS:  /71   Pulse 66   Temp 97.6 °F (36.4 °C) (Oral)   Resp 14   Ht 5' 6\" (1.676 m)   Wt 212 lb 8.2 oz (96.4 kg)   SpO2 99%   BMI 34.30 kg/m²   GENERAL: alert, cooperative, no distress  ABDOMEN: tenderness present- mild,  without rebound and guarding and distention present-obese  I/O last 3 completed shifts: In: 571.3 [P.O.:420; I.V.:151.3]  Out: -   No intake/output data recorded. LABS  Recent Labs     08/14/19  0642   WBC 7.3   HGB 13.2*   HCT 38.9*         K 3.9      CO2 19*   BUN 12   CREATININE 0.9   CALCIUM 9.1   AST 16   ALT 19   BILITOT 0.6     CBC:   Lab Results   Component Value Date    WBC 7.3 08/14/2019    RBC 4.31 08/14/2019    HGB 13.2 08/14/2019    HCT 38.9 08/14/2019    MCV 90.2 08/14/2019    MCH 30.6 08/14/2019    MCHC 33.9 08/14/2019    RDW 13.0 08/14/2019     08/14/2019    MPV 7.8 08/14/2019     CMP:    Lab Results   Component Value Date     08/14/2019    K 3.9 08/14/2019    K 3.9 08/13/2019     08/14/2019    CO2 19 08/14/2019    BUN 12 08/14/2019    CREATININE 0.9 08/14/2019    GFRAA >60 08/14/2019    GFRAA >60 07/10/2012    AGRATIO 1.3 08/14/2019    LABGLOM >60 08/14/2019    GLUCOSE 143 08/14/2019    PROT 6.2 08/14/2019    PROT 6.6 10/31/2011    LABALBU 3.5 08/14/2019    CALCIUM 9.1 08/14/2019    BILITOT 0.6 08/14/2019    ALKPHOS 96 08/14/2019    AST 16 08/14/2019    ALT 19 08/14/2019         Yohannes Walls PA-C  Electronically signed 8/14/2019 at 10:49 AM      Surgery Staff  I have examined this patient and read and agree with the note by Suzette Shah PA-C from today. Denies pain but c/o mild nausea. Abdomen nontender. Fullness appreciated RUQ  Labs ok    Acute cholecystitis - improving with abx. Continue conservative care. Cardiology recs noted. No plan for intervention at this point.   Will need cleared at some point for laparoscopic cholecystectomy 4-6 weeks from now  Low fat diet as tolerated  Possible D/C tomorrow from surgical standpoint  Electronically signed by MARU Colmenares on 8/14/2019 at 10:49 AM

## 2019-08-14 NOTE — DISCHARGE INSTR - COC
07/24/2018    Pneumococcal Polysaccharide ([id number]) 10/26/2012    Tdap (Boostrix, Adacel) 06/18/2014       Active Problems:  Patient Active Problem List   Diagnosis Code    TIA (transient ischemic attack) G45.9    Hemorrhagic stroke ([facility] I61.9    MS (multiple sclerosis) ([facility] G35    Rotator cuff tear M75.100    Lumbar disc disease with radiculopathy M51.16    CHF with left ventricular diastolic dysfunction, NYHA class 2 ([facility]) I50.30    Ischemic cardiomyopathy I25.5    Essential hypertension I10    Non morbid obesity due to excess calories E66.09    Hypercholesterolemia E78.00    Chronic midline low back pain without sciatica M54.5, G89.29    Coronary artery disease involving native coronary artery of native heart without angina pectoris I25.10    Major depression single episode, in partial remission ([facility]) F32.4    Biventricular automatic implantable cardioverter defibrillator in situ Z95.810    Cardiomyopathy ([facility] I42.9    Heart failure, chronic systolic ([facility]) Z26.79    ICD (implantable cardioverter-defibrillator), biventricular, in situ Z95.810    Erectile dysfunction of non-organic origin F52.21    SAMANTHA (obstructive sleep apnea) G47.33    Acute cholecystitis K81.0    Elevated troponin R74.8       Isolation/Infection:   Isolation          No Isolation            Nurse Assessment:  Last Vital Signs: BP 86/64 Comment: manual  Pulse 84   Temp 98 °F (36.7 °C) (Oral)   Resp 16   Ht 5' 6\" (1.676 m)   Wt 212 lb 8.2 oz (96.4 kg)   SpO2 98%   BMI 34.30 kg/m²     Last documented pain score (0-10 scale): Pain Level: 0  Last Weight:   Wt Readings from Last 1 Encounters:   08/14/19 212 lb 8.2 oz (96.4 kg)     Mental Status:  oriented and alert    IV Access:  - None    Nursing Mobility/ADLs:  Walking   Independent  Transfer  Independent  Bathing  Independent  Dressing  Independent  Toileting  Independent  Feeding  Independent  Med Admin  Independent  Med Delivery   whole    Wound Care

## 2019-09-02 NOTE — ED PROVIDER NOTES
Exam:  Physical Exam   Constitutional: He is oriented to person, place, and time. Vital signs are normal. He appears well-developed and well-nourished. Non-toxic appearance. He appears ill. No distress. HENT:   Head: Normocephalic and atraumatic. Eyes: Conjunctivae are normal. No scleral icterus. Neck: Normal range of motion. No JVD present. Cardiovascular: Normal rate and regular rhythm. Exam reveals no gallop and no friction rub. No murmur heard. Pulmonary/Chest: Effort normal and breath sounds normal. No respiratory distress. Abdominal: Soft. Normal appearance and bowel sounds are normal. He exhibits no distension and no mass. There is tenderness. There is no rigidity and no guarding. Musculoskeletal: Normal range of motion. Neurological: He is alert and oriented to person, place, and time. No cranial nerve deficit. Skin: Skin is warm, dry and intact. Capillary refill takes less than 2 seconds. No rash noted. Psychiatric: He has a normal mood and affect. Nursing note and vitals reviewed.       MEDICAL DECISION MAKING    Vitals:    Vitals:    09/01/19 2327 09/02/19 0143 09/02/19 0300 09/02/19 0405   BP: (!) 160/86  (!) 175/78 (!) 122/55   Pulse: 54  67 76   Resp: 16  16 21   Temp: 96.9 °F (36.1 °C)      TempSrc: Oral      SpO2: 98%  100% 97%   Weight:  200 lb 2.8 oz (90.8 kg)     Height: 5' 6\" (1.676 m)          LABS:  Labs Reviewed   CBC WITH AUTO DIFFERENTIAL - Abnormal; Notable for the following components:       Result Value    Neutrophils Absolute 8.2 (*)     Lymphocytes Absolute 0.3 (*)     All other components within normal limits    Narrative:     Performed at:  [facility] [address] [place], [place] [address]   Phone [phone]   COMPREHENSIVE METABOLIC PANEL W/ REFLEX TO MG FOR LOW K - Abnormal; Notable for the following components:    Glucose 186 (*)     Total Bilirubin 2.1 (*)     Alkaline Phosphatase 202 (*)     ALT 66 (*)     AST

## 2019-09-02 NOTE — PROGRESS NOTES
Patient's BP is 90/53 at this time. He denies distress. Bolus 500 ml completed. MD notified. Awaiting for response.  Electronically signed by Fanny Ortega RN on 9/2/2019 at 3:37 PM

## 2019-09-02 NOTE — CONSULTS
15.0 09/02/2019    HCT 43.9 09/02/2019    MCV 88.5 09/02/2019    MCH 30.2 09/02/2019    MCHC 34.1 09/02/2019    RDW 13.5 09/02/2019     09/02/2019    MPV 8.7 09/02/2019     CMP:    Lab Results   Component Value Date     09/02/2019    K 4.2 09/02/2019     09/02/2019    CO2 22 09/02/2019    BUN 12 09/02/2019    CREATININE 1.2 09/02/2019    GFRAA >60 09/02/2019    GFRAA >60 07/10/2012    AGRATIO 1.3 09/02/2019    LABGLOM >60 09/02/2019    GLUCOSE 186 09/02/2019    PROT 7.1 09/02/2019    PROT 6.6 10/31/2011    LABALBU 4.0 09/02/2019    CALCIUM 9.8 09/02/2019    BILITOT 2.1 09/02/2019    ALKPHOS 202 09/02/2019     09/02/2019    ALT 66 09/02/2019     Urine Culture:  No components found for: CURINE  Blood Culture:  No components found for: CBLOOD, CFUNGUSBL  RADIOLOGY:     CT scan abd/pelvis: CT findings suspicious of acute cholecystitis.  No radiopaque calculus in the  gallbladder neck. Thank you for the interesting evaluation. Further recommendations to follow. Electronically signed by MARU Rivera on 9/2/2019 at 8:15 AM      Surgery Staff  I have examined this patient and read and agree with the note by Cookie Edouard PA-C from today. Patient known from previous admission 3 weeks ago. Reports improvement in pain with abx but intermittent pain since last Wednesday. Acutely worse yesterday. Received clearance from Dr. Aidee Schumacher last week. Abdomen soft, mild tenderness RUQ  WBC normal but elevated LFTs  CT images reviewed with ongoing cholecystitis    Cholecystitis - admit for IV abx  Trend LFTs. If improved then will plan laparoscopic cholecystectomy with cholangiogram, possible open as early as tomorrow. The risks, benefits and alternatives to the planned procedure were discussed. Patient expressed an understanding and is willing to proceed. If LFTs worsen then will likely need ERCP preop. D/W GI Dr. Kentrell Juarez.     Electronically signed by Cornell De La Torre MD on 9/2/2019 at

## 2019-09-02 NOTE — PROGRESS NOTES
Patient's BP noted on a low side today. He denies dizziness or lightheadedness; reports no dyspnea/SOB. Call received from [facility] with report of 10 beats of VTach. Dr. Susy Rodriguez notified. Clarified that patient with a hx of CHF. New order received for bolus of fluids at this time.  Electronically signed by Brent Hull RN on 9/2/2019 at 12:30 PM

## 2019-09-02 NOTE — CONSULTS
GASTROENTEROLOGY INPATIENT CONSULTATION      IDENTIFYING DATA/REASON FOR CONSULTATION   PATIENT:  Mynor Short  MRN:  [id number]  ADMIT DATE: 9/2/2019  TIME OF EVALUATION: 9/2/2019 9:35 AM  HOSPITAL STAY:   LOS: 0 days     REASON FOR CONSULTATION:  Cholecystitis/Abnormal LFT    HISTORY OF PRESENT ILLNESS   Mynor Short is a 75 y.o. male who has a past history notable for CHF, CAD, CVA, and MS who presents with abdominal pain. We have been consulted regarding Cholecystitis/Abnormal LFT. Patient was recently admitted with cholecystitis which was treated medically as patient was felt to be too high risk for cholecystectomy from a Cardiac standpoint. He was discharged on antibiotics. He worsening abdominal pain in the right abdomen since Wednesday associated with nausea/vomiting. He reports sweats but no fevers or chills.      PAST MEDICAL, SURGICAL, FAMILY, and SOCIAL HISTORY     Past Medical History:   Diagnosis Date    Chronic midline low back pain without sciatica     Congestive heart failure with left ventricular diastolic dysfunction, NYHA class 3 ([address]     Coronary artery disease involving native coronary artery of native heart without angina pectoris 1-14-15    Drug Eluting Stents x 3 / Dr. Divina Haro Erectile dysfunction of non-organic origin     Essential hypertension     Hemorrhagic stroke ([address]     Hypercholesterolemia     Ischemic cardiomyopathy     Dr. Joel Salgado / Implanted ICD    Lumbar disc disease with radiculopathy     laminectomy dr Alexandre Moreno  1/2014    Major depression single episode, in partial remission ([address]     MS (multiple sclerosis) ([address]     Dr. Juan A Palmer Non morbid obesity due to excess calories     Rotator cuff tear     Lt    TIA (transient ischemic attack)      Past Surgical History:   Procedure Laterality Date    CARDIAC DEFIBRILLATOR PLACEMENT  12/2016    CORONARY ANGIOPLASTY WITH STENT PLACEMENT  1-14-15    Drug Eluting Stents x 3    LUMBAR LAMINECTOMY  jan 2014 PRN   Or     potassium chloride 10 mEq PRN   magnesium sulfate 1 g PRN   acetaminophen 650 mg Q4H PRN   morphine 2 mg Q4H PRN     ALLERGIES:  He [unfilled]    REVIEW OF SYSTEMS   A full 12 pt ROS is negative other than noted in HPI    PHYSICAL EXAM   [unfilled]   No intake/output data recorded.   Oxygen Therapy:  @[id number])@  @[id number]([id number])@  @[id number])@    Physical Exam:  Gen: Resting in bed, NAD   CV: RRR no MRG   Pul: CTAB   Abd: Good bowel sounds throughout, no scars, soft, minimal tenderness on exam, no rebound or guarding, no masses, no HSM   Ext: No edema   Neuro: No asterixis   Skin: No jaundice, spider angiomas, corcoran erythema    LABS AND IMAGING     Recent Results (from the past 24 hour(s))   CBC Auto Differential    Collection Time: 09/02/19  1:52 AM   Result Value Ref Range    WBC 9.2 4.0 - 11.0 K/uL    RBC 4.96 4.20 - 5.90 M/uL    Hemoglobin 15.0 13.5 - 17.5 g/dL    Hematocrit 43.9 40.5 - 52.5 %    MCV 88.5 80.0 - 100.0 fL    MCH 30.2 26.0 - 34.0 pg    MCHC 34.1 31.0 - 36.0 g/dL    RDW 13.5 12.4 - 15.4 %    Platelets 904 961 - 724 K/uL    MPV 8.7 5.0 - 10.5 fL    Neutrophils % 89.5 %    Lymphocytes % 3.4 %    Monocytes % 5.1 %    Eosinophils % 0.3 %    Basophils % 1.7 %    Neutrophils Absolute 8.2 (H) 1.7 - 7.7 K/uL    Lymphocytes Absolute 0.3 (L) 1.0 - 5.1 K/uL    Monocytes Absolute 0.5 0.0 - 1.3 K/uL    Eosinophils Absolute 0.0 0.0 - 0.6 K/uL    Basophils Absolute 0.2 0.0 - 0.2 K/uL   Comprehensive Metabolic Panel w/ Reflex to MG    Collection Time: 09/02/19  1:52 AM   Result Value Ref Range    Sodium 139 136 - 145 mmol/L    Potassium reflex Magnesium 4.2 3.5 - 5.1 mmol/L    Chloride 103 99 - 110 mmol/L    CO2 22 21 - 32 mmol/L    Anion Gap 14 3 - 16    Glucose 186 (H) 70 - 99 mg/dL    BUN 12 7 - 20 mg/dL    CREATININE 1.2 0.8 - 1.3 mg/dL    GFR Non-African American >60 >60    GFR African American >60 >60    Calcium 9.8 8.3 - 10.6 mg/dL    Total Protein 7.1 6.4 -

## 2019-09-03 NOTE — BRIEF OP NOTE
Brief Postoperative Note  ______________________________________________________________    Patient: Angel Streeter  YOB: 1955  MRN: [id number]  Date of Procedure: 9/3/2019    Pre-Op Diagnosis: acute cholecystitis    Post-Op Diagnosis: Same       Procedure(s):  LAPAROSCOPIC CHOLECYSTECTOMY    Anesthesia: General    Surgeon(s):  Herbie Cordero MD    Assistant: Charlene Kruger    Estimated Blood Loss (mL): less than 50     Complications: None    Specimens:   ID Type Source Tests Collected by Time Destination   A : A) Gallbladder Tissue Gallbladder SURGICAL PATHOLOGY Herbie Cordero MD 9/3/2019 1502        Implants:  * No implants in log *      Drains:   Closed/Suction Drain Right RUQ;Abdomen Bulb 19 Swedish (Active)       Findings: severe cholecystitis, obliterated cystic duct?     Herbie Cordero MD  Date: 9/3/2019  Time: 3:43 PM

## 2019-09-03 NOTE — PROGRESS NOTES
choledocholithiasis    Plan; to OR for lap judd with IOC. Follow up results of IOC. If positive, patient may need ERCP. Thank you for allowing me to participate in this patient's care. If there are any questions or concerns regarding this patient, or the plan we have set in place, please feel free to contact me at [phone].      Lola Jane, DO

## 2019-09-03 NOTE — ANESTHESIA POSTPROCEDURE EVALUATION
Department of Anesthesiology  Postprocedure Note    Patient: Willie Hwang  MRN: [id number]  YOB: 1955  Date of evaluation: 9/3/2019  Time:  5:06 PM     Procedure Summary     Date:  09/03/19 Room / Location:  [facility] OR 05 / [facility] OR    Anesthesia Start:  6315 Anesthesia Stop:  0485    Procedure:  LAPAROSCOPIC CHOLECYSTECTOMY (N/A Abdomen) Diagnosis:  (.)    Surgeon:  Ursula Dancer, MD Responsible Provider:  Pérez Boateng MD    Anesthesia Type:  general ASA Status:  4          Anesthesia Type: general    Royce Phase I: Royce Score: 9    Royce Phase II:      Last vitals: Reviewed and per EMR flowsheets.        Anesthesia Post Evaluation    Patient location during evaluation: PACU  Patient participation: complete - patient participated  Level of consciousness: awake and alert  Airway patency: patent  Nausea & Vomiting: no nausea and no vomiting  Complications: no  Cardiovascular status: hemodynamically stable  Respiratory status: acceptable  Hydration status: stable

## 2019-09-03 NOTE — ANESTHESIA PRE PROCEDURE
dysfunction of non-organic origin F52.21    SAMANTHA (obstructive sleep apnea) G47.33    Acute cholecystitis K81.0    Transaminitis R74.0    Congestive heart failure with left ventricular diastolic dysfunction, NYHA class 3 ([facility]) I50.30       Past Medical History:        Diagnosis Date    Chronic midline low back pain without sciatica     Congestive heart failure with left ventricular diastolic dysfunction, NYHA class 3 ([address]     Coronary artery disease involving native coronary artery of native heart without angina pectoris 1-14-15    Drug Eluting Stents x 3 / Dr. Kailyn Rangel Erectile dysfunction of non-organic origin     Essential hypertension     Hemorrhagic stroke ([facility]     Hypercholesterolemia     Ischemic cardiomyopathy     Dr. Concepcion John / Implanted ICD    Lumbar disc disease with radiculopathy     laminectomy dr Bradley Dos Santos  2014    Major depression single episode, in partial remission ([facility]     MS (multiple sclerosis) ([facility]     Dr. Jones Day Non morbid obesity due to excess calories     Rotator cuff tear     Lt    TIA (transient ischemic attack)        Past Surgical History:        Procedure Laterality Date    CARDIAC DEFIBRILLATOR PLACEMENT  2016    CORONARY ANGIOPLASTY WITH STENT PLACEMENT  1-14-15    Drug Eluting Stents x 3    LUMBAR LAMINECTOMY  2014    dr Jae Tamayo      from arm       Social History:    Social History     Tobacco Use    Smoking status: Former Smoker     Last attempt to quit: 1994     Years since quittin.0    Smokeless tobacco: Former User    Tobacco comment: smoked on and off as a teen   Substance Use Topics    Alcohol use: Yes     Comment: 1 can of beer every 3 months                                Counseling given: Not Answered  Comment: smoked on and off as a teen      Vital Signs (Current):   Vitals:    19 5757 19 0516 19 0832 19 0937   BP:  122/64 132/66 (!) 148/79   Pulse:  60 59 57   Resp:  16 17 16   Temp:  97.9 °F (36.6 °C) 98.2 °F (36.8 °C) 98.2 °F (36.8 °C)   TempSrc:  Axillary Axillary Oral   SpO2:  94% 96% 95%   Weight: 181 lb 14.1 oz (82.5 kg)      Height:                                                  BP Readings from Last 3 Encounters:   09/03/19 (!) 148/79   08/26/19 90/60   08/14/19 116/71       NPO Status: Time of last liquid consumption: 0842(a sip with meds )                        Time of last solid consumption: 1142                        Date of last liquid consumption: 09/03/19                        Date of last solid food consumption: 07/31/19    BMI:   Wt Readings from Last 3 Encounters:   09/03/19 181 lb 14.1 oz (82.5 kg)   08/26/19 202 lb (91.6 kg)   08/14/19 212 lb 8.2 oz (96.4 kg)     Body mass index is 29.36 kg/m².     CBC:   Lab Results   Component Value Date    WBC 4.2 09/03/2019    RBC 4.14 09/03/2019    HGB 12.4 09/03/2019    HCT 36.6 09/03/2019    MCV 88.4 09/03/2019    RDW 13.5 09/03/2019     09/03/2019       CMP:   Lab Results   Component Value Date     09/03/2019    K 3.5 09/03/2019    K 4.2 09/02/2019     09/03/2019    CO2 24 09/03/2019    BUN 9 09/03/2019    CREATININE 1.2 09/03/2019    GFRAA >60 09/03/2019    GFRAA >60 07/10/2012    AGRATIO 1.1 09/03/2019    LABGLOM >60 09/03/2019    GLUCOSE 99 09/03/2019    PROT 5.6 09/03/2019    PROT 6.6 10/31/2011    CALCIUM 9.1 09/03/2019    BILITOT 1.2 09/03/2019    ALKPHOS 179 09/03/2019     09/03/2019     09/03/2019       POC Tests:   Recent Labs     09/03/19  1251   POCGLU 102*       Coags:   Lab Results   Component Value Date    PROTIME 12.3 12/08/2016    INR 1.08 12/08/2016    APTT 27.8 07/01/2010       HCG (If Applicable): No results found for: PREGTESTUR, PREGSERUM, HCG, HCGQUANT     ABGs: No results found for: PHART, PO2ART, GKN1XGB, MWY5SGU, BEART, K4RZLKIT     Type & Screen (If Applicable):  No results found for: LABABO,

## 2019-09-03 NOTE — PROGRESS NOTES
Nutrition Assessment    Type and Reason for Visit: Initial, Positive Nutrition Screen    Nutrition Recommendations:   · Advance diet as medically appropriate per MD  · Add Ensure w/diet advancement   · Obtain actual wt  · Monitor nutrition adequacy, pertinent labs, bowel habits, wt changes, and clinical progress    Nutrition Assessment: Pt nutritionally compromised AEB wt loss. Pt admitted with abdominal pain. Pt scheduled for cholecystectomy with cholangiogram today. Pt seen this am w/significant other present. Pt voiced having a decreased appetite and wt loss. Pts significant other states that pt is afraid to eat d/t stomach pains and has not been eating well at home. Pt favorable to ONS w/diet advancement. Pt at risk for decline d/t wt loss, poor po intakes, and CHF. Will continue to monitor for changes in nutritional status. Malnutrition Assessment:  · Malnutrition Status: At risk for malnutrition  · Context: Acute illness or injury    Nutrition Risk Level: Moderate    Nutrient Needs:  · Estimated Daily Total Kcal: 8623-3930 (20-25 kcal/kg CBW)  · Estimated Daily Protein (g): 77-89 (1.2-1.4 gm/kg IBW)  · Estimated Daily Total Fluid (ml/day): 1 ml/kcal or per MD    Nutrition Diagnosis:   · Problem: Inadequate oral intake  · Etiology: related to Insufficient energy/nutrient consumption     Signs and symptoms:  as evidenced by Patient report of, Diet history of poor intake, Weight loss    Objective Information:  · Nutrition-Focused Physical Findings: BM 9/2. trace RLE/LLE edema. · Wound Type: None  · Current Nutrition Therapies:  · Oral Diet Orders: NPO   · Oral Diet intake: NPO  · Oral Nutrition Supplement (ONS) Orders: None  · ONS intake: NPO  · Anthropometric Measures:  · Ht: 5' 6\" (167.6 cm)   · Current Body Wt: 181 lb (82.1 kg)  · Admission Body Wt: 200 lb (90.7 kg)  · Usual Body Wt: 214 lb (97.1 kg)  · % Weight Change:    212# in 8/19. -12 lb from admission wt x 1 month.    · Ideal Body Wt: 142 lb (64.4 kg),   · BMI Classification: BMI 25.0 - 29.9 Overweight    Nutrition Interventions:   Continue NPO  Continued Inpatient Monitoring    Nutrition Evaluation:   · Evaluation: Goals set   · Goals: Pt will tolerate the most appropriate form of nutrition therapy.      · Monitoring: Nutrition Progression, Nausea or Vomiting, Monitor Bowel Function      Electronically signed by Majo Crow RD, VA[facility] on 9/3/19 at 12:11 PM    Contact Number: [phone]

## 2019-09-04 NOTE — PROGRESS NOTES
Hospitalist Progress Note  9/4/2019 9:12 AM    PCP: Anam Flores DO    [id number]     Date of Admission: 9/2/2019                                                                                                                   HOSPITAL COURSE    Patient demographics:  The patient  Chloe Segal is a 75 y.o. male     Significant past medical history:   Patient Active Problem List   Diagnosis    TIA (transient ischemic attack)    [facility]    MS (multiple sclerosis) ([facility]    Rotator cuff tear    Lumbar disc disease with radiculopathy    Ischemic cardiomyopathy    Essential hypertension    Non morbid obesity due to excess calories    Hypercholesterolemia    Chronic midline low back pain without sciatica    Coronary artery disease involving native coronary artery of native heart without angina pectoris    Major depression single episode, in partial remission ([facility]    Biventricular automatic implantable cardioverter defibrillator in situ    Cardiomyopathy ([facility]    Heart failure, chronic systolic ([facility]    ICD (implantable cardioverter-defibrillator), biventricular, in situ    Erectile dysfunction of non-organic origin    SAMANTHA (obstructive sleep apnea)    Acute cholecystitis    Transaminitis    Congestive heart failure with left ventricular diastolic dysfunction, NYHA class 3 ([facility])         Presenting symptoms:  Abdominal pain     Diagnostic workup:      CONSULTS DURING ADMISSION :   IP CONSULT TO GENERAL SURGERY  IP CONSULT TO GI      Patient was diagnosed with:        Treatment while inpatient:                                                                                         ----------------------------------------------------------      SUBJECTIVE COMPLAINTS- follow-up for cholecystitis    Diet: DIET LOW FAT;      OBJECTIVE:   Patient Active Problem List   Diagnosis    TIA (transient ischemic attack)    Hemorrhagic [facility] ([facility]    MS (multiple sclerosis) ([facility]    PLAN    Acute cholecystitis  Status post laparoscopic cholecystectomy 9/3  BRANDON drain in place-bloody nonbilous  Continue on Zosyn  Advance diet      Pain management  R52  Continue with the IV and oral pain medication    Essential primary hypertension I10  1 dose of Lasix in addition to an interest to  Mildly elevated blood pressure, cough and congestion but chest x-ray is clear    General weakness  OT PT, out of bed and ambulation  Incentive spirometry            Code Status: Full Code        Dispo -cc        The patient and / or the family were informed of the results of any tests, a time was given to answer questions, a plan was proposed and they agreed with plan. Shane Calero MD    This note was transcribed using [id number] [employer]. Please disregard any translational errors.

## 2019-09-04 NOTE — PROGRESS NOTES
Patient is AAOx4. RR even and unlabored. Assisted him up to chair at this time; patient stated that he will try to walk with me later today. Educated him on the use of IS; C&DB. Patient demonstrated with encouragement; c/o abd pain with cough. Encouraged to hold a pillow at belly for comfort. Patient denies nausea and says he can eat without issues. Fall prec in place. Call-light within reach. Family at bedside.  Electronically signed by Cj Adkins RN on 9/4/2019 at 5:00 PM

## 2019-09-05 NOTE — PROGRESS NOTES
Received a call from [address] [place] [address] asking for telemetry monitor back stating that they were told by nursing to d/c tele at [address] [place] [address] on 9/4. This RN did not tell [facility] to d/c monitor. Patient has had monitor on all night but was not monitored. Tele monitor on now with a rhythm of NSR. No needs at this time. Patient resting comfortably in bed. Call light in reach. Bed alarm on. Will continue to monitor.    Electronically signed by Hannah Jacome RN on 9/5/2019 at 6:36 AM

## 2019-09-05 NOTE — PROGRESS NOTES
[facility] [phone]                                     Daily Progress Note                                                         Pt Name: Roge Peterson Record Number: [id number]  Date of Birth 1955   Today's Date: 9/5/2019  Chief Complaint   Patient presents with    Emesis     n/v, intermittent ruq abd pain. recent admission for cholecystitis. family states pt was just cleared by cardiology for surgery. ASSESSMENT/PLAN  Severe acute cholecystitis  -9/3/19 lap judd  -BRANDON drain bloody, bile tinged  -ok for low fat diet  -pain controlled  -oob, ambulate. PT/OT if necessary  -IS, cough and deep breathe. SCDs. Discharge Planning: continue inpatient      SUBJECTIVE  Trino has improved from yesterday. Pain is well controlled. He has no nausea and no vomiting. He has  passed flatus and has not had a bowel movement. He is tolerating thin liquids. OBJECTIVE  VITALS:  height is 5' 6\" (1.676 m) and weight is 198 lb 13.7 oz (90.2 kg). His oral temperature is 97.6 °F (36.4 °C). His blood pressure is 110/60 and his pulse is 74. His respiration is 22 and oxygen saturation is 96%. INTAKE/OUTPUT:      Intake/Output Summary (Last 24 hours) at 9/5/2019 1157  Last data filed at 9/5/2019 0941  Gross per 24 hour   Intake 810 ml   Output 100 ml   Net 710 ml     GENERAL: alert, no distress  LUNGS: clear to ausculation, without wheezes, rales or rhonci  HEART: normal rate and regular rhythm  ABDOMEN: soft, appropriately-tender, non-distended and bowel sounds present in all 4 quadrants. Drain SS. Non bilious. EXTREMITY: no cyanosis, clubbing or edema    I/O last 3 completed shifts: In: 1050 [P.O.:800;  I.V.:250]  Out: 130 [Drains:130]      LABS  Recent Labs     09/05/19  0750   WBC 7.0   HGB 13.3*   HCT 39.7*         K 3.7      CO2 24   BUN 10   CREATININE 1.1   CALCIUM 9.0   AST 41*   ALT 74*

## 2019-09-05 NOTE — PROGRESS NOTES
Desirae Gurrola. I agree with her consultation note, exam findings, assessment and plans  as written above. I have made appropriate modifications and edited her assessment and plan where needed to reflect my impression and plans for this patient. Labs improving  ? Bile noted in BRANDON drain in GB fossa. Surgery is considering a HIDA in am for evaluating a bile leak  Will follow up HIDA scan. Patient may need ERCP with intervention if there was a bile leak on HIDA in am  Diet per surgery  Will follow. Thank you for allowing me to participate in this patient's care. If there are any questions or concerns regarding this patient, or the plan we have set in place, please feel free to contact me at [phone].      DO Vicki Beckett

## 2019-09-05 NOTE — PROGRESS NOTES
Intake/Output Summary (Last 24 hours) at 9/5/2019 0928  Last data filed at 9/5/2019 0436  Gross per 24 hour   Intake 810 ml   Output 60 ml   Net 750 ml       Exam:    Gen:   Alert and oriented ×3  Eyes: PERRL. No sclera icterus. No conjunctival injection. ENT: No discharge. Pharynx clear. External appearance of ears and nose normal.  Neck: Trachea midline. No obvious mass. Resp: No accessory muscle use. No crackles. No wheezes. No rhonchi. CV: Regular rate. Regular rhythm. No murmur or rub. No edema. GI: abdomen is soft and tender in epigastric area  Skin: Warm, dry, normal texture and turgor. Lymph: No cervical LAD. No supraclavicular LAD. M/S: / Ext. No cyanosis. No clubbing. No joint deformity. Neuro: CN 2-12 are intact,  no neurologic deficits noted. PT/INR: No results for input(s): PROTIME, INR in the last 72 hours. APTT: No results for input(s): APTT in the last 72 hours. CBC:   Recent Labs     09/03/19  0720 09/04/19  0733 09/05/19  0750   WBC 4.2 7.1 7.0   HGB 12.4* 13.8 13.3*   HCT 36.6* 41.8 39.7*   MCV 88.4 91.4 89.9    165 164       BMP:   Recent Labs     09/03/19  0719 09/04/19  0733 09/05/19  0750    138 139   K 3.5 3.9 3.7    105 101   CO2 24 22 24   BUN 9 9 10   CREATININE 1.2 1.1 1.1       LIVER PROFILE:   Recent Labs     09/03/19  0719 09/04/19  0733 09/05/19  0750   ALKPHOS 179* 161* 127   * 88* 41*   * 105* 74*   BILITOT 1.2* 1.1* 1.1*     No results for input(s): AMYLASE in the last 72 hours. No results for input(s): LIPASE in the last 72 hours. UA:No results for input(s): NITRITE, LABCAST, WBCUA, RBCUA, MUCUS in the last 72 hours. TROPONIN:   No results for input(s): Stella Patel in the last 72 hours. Lab Results   Component Value Date/Time    URRFLXCULT Not Indicated 08/10/2019 08:05 PM       No results for input(s): TSHREFLEX in the last 72 hours.     No components found for: [id number]  POC GLUCOSE:    Recent Labs 09/03/19  1251   POCGLU 102*     No results for input(s): LABA1C in the last 72 hours. Lab Results   Component Value Date    LABA1C 5.7 09/02/2019         ASSESSMENT AND PLAN    Acute cholecystitis  Status post laparoscopic cholecystectomy 9/3  BRANDON drain in place-bloody nonbilous  Continue on Zosyn  Low-fat diet    Chronic systolic CHF  Last echocardiogram shows ejection fraction of 25%  Started patient on diuretics  Get echocardiogram    Pain management  R52  Continue with the IV and oral pain medication    Essential primary hypertension I10  1 dose of Lasix in addition to an interest to  Mildly elevated blood pressure, cough and congestion but chest x-ray is clear    General weakness  OT PT, out of bed and ambulation  Incentive spirometry      Code Status: Full Code        Dispo -cc        The patient and / or the family were informed of the results of any tests, a time was given to answer questions, a plan was proposed and they agreed with plan. Shane Calero MD    This note was transcribed using [id number]. Please disregard any translational errors.

## 2019-09-05 NOTE — PLAN OF CARE
Pain/discomfort being managed with PRN analgesics per MD orders. Pt able to express presence and absence of pain and rate pain appropriately using numerical scale. Pt free from falls this shift. Fall precautions in place at all times. Call light always within reach. Pt able and agreeable to contact for safety appropriately. Patient remains free of any signs of surgical site infection at this time.

## 2019-09-06 NOTE — PROGRESS NOTES
[facility] [phone]                                     Daily Progress Note                                                         Pt Name: Roge Peterson Record Number: [id number]  Date of Birth 1955   Today's Date: 9/6/2019  Chief Complaint   Patient presents with    Emesis     n/v, intermittent ruq abd pain. recent admission for cholecystitis. family states pt was just cleared by cardiology for surgery. ASSESSMENT/PLAN  Severe acute cholecystitis  -9/3/19 lap judd  -BRANDON drain bloody, bile tinged  -ok for low fat diet  -pain controlled  -oob, ambulate. PT/OT if necessary  -IS, cough and deep breathe. SCDs. -HIDA negative for bile leak  -continue BRANDON drain at discharge. Follow up with Dr. Francine Pablo in one week for possible drain removal.         Discharge Planning: continue inpatient      SUBJECTIVE  Trino has improved from yesterday. Pain is well controlled. He has no nausea and no vomiting. He has  passed flatus and has not had a bowel movement. He is tolerating thin liquids. OBJECTIVE  VITALS:  height is 5' 6\" (1.676 m) and weight is 199 lb 11.8 oz (90.6 kg). His oral temperature is 97.8 °F (36.6 °C). His blood pressure is 125/61 and his pulse is 68. His respiration is 16 and oxygen saturation is 96%. INTAKE/OUTPUT:      Intake/Output Summary (Last 24 hours) at 9/6/2019 1223  Last data filed at 9/6/2019 1014  Gross per 24 hour   Intake 580 ml   Output 190 ml   Net 390 ml     GENERAL: alert, no distress  LUNGS: clear to ausculation, without wheezes, rales or rhonci  HEART: normal rate and regular rhythm  ABDOMEN: soft, appropriately-tender, non-distended and bowel sounds present in all 4 quadrants. Drain SS. Non bilious. EXTREMITY: no cyanosis, clubbing or edema    I/O last 3 completed shifts:   In: 580 [P.O.:480; IV Piggyback:100]  Out: [address] [place] [employer] [Drains:195]      LABS  Recent Labs     09/05/19  0750

## 2019-09-06 NOTE — FLOWSHEET NOTE
Climbing OOB, alarm sounding. Assisted to BR to void. Medicated for back and abd pain. Bile colored fluid per BRANDON, abd binder in place, will continue to monitor.

## 2019-09-06 NOTE — PROGRESS NOTES
INPATIENT CONSULTATION:    IDENTIFYING DATA/REASON FOR CONSULTATION   PATIENT:  Michael Serna  MRN:  [id number]  ADMIT DATE: 9/2/2019  TIME OF EVALUATION: 9/6/2019 11:59 AM  HOSPITAL STAY:   LOS: 4 days   CONSULTING PHYSICIAN: Lily Coleman MD   REASON FOR CONSULTATION: Cholecystitis/Abnormal LFT    Subjective:    Patient has no complaints this morning. Pain controlled. No nausea or vomiting. MEDICATIONS   SCHEDULED:      furosemide 40 mg Daily   aspirin 81 mg Daily   atorvastatin 20 mg Daily   sacubitril-valsartan 1 tablet BID   sodium chloride flush 10 mL 2 times per day   enoxaparin 40 mg Daily   piperacillin-tazobactam 3.375 g Q8H     FLUIDS/DRIPS:    PRNs:     HYDROcodone 5 mg - acetaminophen 1 tablet Q4H PRN   Or     HYDROcodone 5 mg - acetaminophen 2 tablet Q4H PRN   sodium chloride flush 10 mL PRN   magnesium hydroxide 30 mL Daily PRN   ondansetron 4 mg Q6H PRN   potassium chloride 40 mEq PRN   Or     potassium alternative oral replacement 40 mEq PRN   Or     potassium chloride 10 mEq PRN   magnesium sulfate 1 g PRN   acetaminophen 650 mg Q4H PRN   morphine 2 mg Q4H PRN     ALLERGIES:  No Known Allergies      PHYSICAL EXAM     Vitals:    09/05/19 2107 09/06/19 0559 09/06/19 0933 09/06/19 0938   BP: (!) 110/54 (!) 115/59  125/61   Pulse: 68 64  68   Resp: 16 16 16 16   Temp: 98 °F (36.7 °C) 98.5 °F (36.9 °C)  97.8 °F (36.6 °C)   TempSrc: Oral Oral  Oral   SpO2: 96% 97% 95% 96%   Weight:  199 lb 11.8 oz (90.6 kg)     Height:           I/O last 3 completed shifts: In: 80 [P.O.:480; IV Piggyback:100]  Out: 195 [Drains:195]    Physical Exam:  General appearance: alert, cooperative, no distress, appears stated age  Eyes: Anicteric  Head: Normocephalic, without obvious abnormality  Lungs: clear to auscultation bilaterally, Normal Effort  Heart: regular rate and rhythm, normal S1 and S2, no murmurs or rubs  Abdomen: soft, minimally tender.    BRANDON drain with serosanguinous outpt  Extremities: atraumatic, addendum:      I have personally seen and examined the patient, reviewed the patient's medical record and pertinent labs and clinical imaging. I have personally staffed the case with MARU Gutierrez. I agree with her consultation note, exam findings, assessment and plans  as written above. I have made appropriate modifications and edited her assessment and plan where needed to reflect my impression and plans for this patient. Liver enzymes normalized  HIDA was negative for bile leak  No further GI workup is planned  Will sign off. Thank you for allowing me to participate in this patient's care. If there are any questions or concerns regarding this patient, or the plan we have set in place, please feel free to contact me at [phone].      [facility], DO

## 2019-09-07 NOTE — DISCHARGE SUMMARY
Hospital Medicine Discharge Summary      Patient ID: Jose Rowland , [id number]     Patient's PCP: Francois Scott DO    Admit Date: 9/2/2019     Discharge Date: 9/6/2019      Admitting Physician: Hayley Siddiqui MD    Discharge Physician: Ruthann Longoria MD     Discharge Diagnoses: Active Hospital Problems    Diagnosis Date Noted    Transaminitis [R74.0]     Acute cholecystitis [K81.0] 08/10/2019         The patient was seen and examined on the day of discharge and this discharge summary is in conjunction with any daily progress note from day of discharge.     Hospital Course:        Patient demographics:  The patient  Jose Rowland is a 75 y.o. male      Significant past medical history:       Patient Active Problem List   Diagnosis    TIA (transient ischemic attack)    Hemorrhagic stroke ([address]    MS (multiple sclerosis) ([address]    Rotator cuff tear    Lumbar disc disease with radiculopathy    Ischemic cardiomyopathy    Essential hypertension    Non morbid obesity due to excess calories    Hypercholesterolemia    Chronic midline low back pain without sciatica    Coronary artery disease involving native coronary artery of native heart without angina pectoris    Major depression single episode, in partial remission ([address]    Biventricular automatic implantable cardioverter defibrillator in situ    Cardiomyopathy ([address]    Heart failure, chronic systolic ([address]    ICD (implantable cardioverter-defibrillator), biventricular, in situ    Erectile dysfunction of non-organic origin    SAMANTHA (obstructive sleep apnea)    Acute cholecystitis    Transaminitis    Congestive heart failure with left ventricular diastolic dysfunction, NYHA class 3 ([address]            Presenting symptoms:  Abdominal pain      Diagnostic workup:        CONSULTS DURING ADMISSION :   IP CONSULT TO GENERAL SURGERY  IP CONSULT TO GI        Patient was diagnosed with:  Acute cholecystitis        Treatment while

## 2019-09-11 PROBLEM — K91.89 BILE LEAK, POSTOPERATIVE: Status: ACTIVE | Noted: 2019-01-01

## 2019-09-11 PROBLEM — K83.9 BILE LEAK: Status: ACTIVE | Noted: 2019-01-01

## 2019-09-11 PROBLEM — K83.8 BILE LEAK, POSTOPERATIVE: Status: ACTIVE | Noted: 2019-01-01

## 2019-09-11 NOTE — PROGRESS NOTES
Occupational Therapy   Occupational Therapy Initial Assessment  See last progress note for discharge status. Date: 2019   Patient Name: Neelima Forman  MRN: [id number]     : 1955    Date of Service: 2019    Discharge Recommendations:  Home with Home health OT, S Level 1       Assessment   Performance deficits / Impairments: Decreased functional mobility ; Decreased ADL status; Decreased cognition;Decreased high-level IADLs  Prognosis: Good  Decision Making: Medium Complexity  History: hx depression, MS, pacemaker, CVA, CHF, now with bile leak after he pulled a drain out following lap judd  Exam: ADLs, transfers  Assistance / Modification: assist, cues, repetition  REQUIRES OT FOLLOW UP: Yes  Activity Tolerance  Activity Tolerance: Patient Tolerated treatment well  Safety Devices  Safety Devices in place: Yes  Type of devices: Nurse notified; Left in chair;Call light within reach; Chair alarm in place           Patient Diagnosis(es): There were no encounter diagnoses. has a past medical history of Chronic midline low back pain without sciatica, Congestive heart failure with left ventricular diastolic dysfunction, NYHA class 3 ([address] Coronary artery disease involving native coronary artery of native heart without angina pectoris, Erectile dysfunction of non-organic origin, Essential hypertension, Hemorrhagic stroke ([address] Hypercholesterolemia, Ischemic cardiomyopathy, Lumbar disc disease with radiculopathy, Major depression single episode, in partial remission ([address] MS (multiple sclerosis) ([address] Non morbid obesity due to excess calories, Rotator cuff tear, and TIA (transient ischemic attack). has a past surgical history that includes Rotator cuff repair; tumor removal; lumbar laminectomy (2014); Coronary angioplasty with stent (1-14-15); Cardiac defibrillator placement (2016); pacemaker placement; and Cholecystectomy, laparoscopic (N/A, 9/3/2019).

## 2019-09-11 NOTE — PROGRESS NOTES
Responsibilities: No(spouse does cooking, cleaning and laundry)  Ambulation Assistance: Independent(denies any recent falls -- denies any recent falls)  Transfer Assistance: Independent  Active : No  Leisure & Hobbies: Golf  IADL Comments: Pt has been mowing his lawn this summer. Additional Comments: Pt states he is able to walk around the grocery store. OBJECTIVE:  OBSERVATIONS  Observation: pt had bile stains on B legs and feet. Assisted pt with clean up. ROM  RLE PROM: WFL     LLE PROM: WFL       STRENGTH  Strength RLE: WFL  Comment: 5/5 hip/knee/ankle     Strength LLE: WFL  Comment: 5/5 hip/knee/ankle       Bed mobility  Supine to Sit: Moderate assistance    Transfers  Sit to Stand: Contact guard assistance(including with no UE support off low visitor's couch, rocking)  Stand to sit: Contact guard assistance    Ambulation  Ambulation  Ambulation?: Yes  Ambulation 1  Device: Rolling Walker  Assistance: Contact guard assistance  Quality of Gait: reduced foot clearance and trunk swing, symmetric step lengths, steady in straight paths and turns  Distance: 120'    Stairs/Curb  Stairs/Curb  Stairs?: No    Balance  Balance  Sitting - Static: Good  Standing - Static: Good        Treatment included transfer training, gait training, and patient education. This note also serves as a D/C Summary in the event that this patient is discharged prior to the next therapy session. Please refer to last PT note for goal status, discharge recommendations and functional status. ASSESSMENT:   Assessment: Chris Garcia is a 75 y.o. M admit 9/11/19 s/p lap-cholecystectomy 9/3/19 -- patient pulled out his BRANDON drain -- GI consulted and patient NPO with possible ERCP. Prior to admit pt was living at home with his spouse in a one-story house and ambulating independently -- largely sedentary but no falls. Today patient ambulated into the hallway with CGA steadily.  He appears to be functioning close to his baseline at

## 2019-09-11 NOTE — CONSULTS
36.0 g/dL    RDW 14.2 12.4 - 15.4 %    Platelets 155 668 - 830 K/uL    MPV 8.6 5.0 - 10.5 fL    Neutrophils % 78.4 %    Lymphocytes % 7.2 %    Monocytes % 10.7 %    Eosinophils % 3.2 %    Basophils % 0.5 %    Neutrophils Absolute 4.6 1.7 - 7.7 K/uL    Lymphocytes Absolute 0.4 (L) 1.0 - 5.1 K/uL    Monocytes Absolute 0.6 0.0 - 1.3 K/uL    Eosinophils Absolute 0.2 0.0 - 0.6 K/uL    Basophils Absolute 0.0 0.0 - 0.2 K/uL   Basic Metabolic Panel w/ Reflex to MG    Collection Time: 09/11/19 12:05 PM   Result Value Ref Range    Sodium 139 136 - 145 mmol/L    Potassium reflex Magnesium 3.6 3.5 - 5.1 mmol/L    Chloride 102 99 - 110 mmol/L    CO2 25 21 - 32 mmol/L    Anion Gap 12 3 - 16    Glucose 124 (H) 70 - 99 mg/dL    BUN 22 (H) 7 - 20 mg/dL    CREATININE 1.1 0.8 - 1.3 mg/dL    GFR Non-African American >60 >60    GFR African American >60 >60    Calcium 9.4 8.3 - 10.6 mg/dL     Other Labs      Imaging      ASSESSMENT AND RECOMMENDATIONS   Piotr Matthew is a 75 y.o. male who has a past medical history of Chronic midline low back pain without sciatica, Congestive heart failure with left ventricular diastolic dysfunction, NYHA class 3 ([address] Coronary artery disease involving native coronary artery of native heart without angina pectoris, Erectile dysfunction of non-organic origin, Essential hypertension, Hemorrhagic stroke ([address] Hypercholesterolemia, Ischemic cardiomyopathy, Lumbar disc disease with radiculopathy, Major depression single episode, in partial remission ([address] MS (multiple sclerosis) ([address] Non morbid obesity due to excess calories, Rotator cuff tear, and TIA (transient ischemic attack). We have been consulted regarding concer for bile leak. IMPRESSION:    1. Elevated bilirubin in ascites: Concern for bile leak, given bilirubin of 22.7 in drain fluid. No abdominal pain, normal LFT's. Will perform MRCP to evaluate for bile leak, which will also determine type of leak and anatomical location.  If bile leak is present, will refer to [facility] for ERCP. 2. History of cholecystectomy    RECOMMENDATIONS:    - MRCP  - Daily LFT's  - Further recommendations following MRCP result. If you have any questions or need any further information, please feel free to contact our consult team.  Thank you for allowing us to participate in the care of Natalee Marley. Gabriel Alejo.  [address]

## 2019-09-11 NOTE — H&P
file     Forced sexual activity: Not on file   Other Topics Concern    Not on file   Social History Narrative    Not on file     No Known Allergies      Review of Systems  Constitutional: loss of appetite  HEENT: negative  Respiratory: negative  Cardiac: negative  GI: as above  : negative  Skin: negative  Heme: negative  MSK: negative    Physical Exam  Vitals:    09/11/19 1134 09/11/19 1216   BP: (!) 88/56    Pulse: 68    Resp: 17 18   Temp: 97.7 °F (36.5 °C)    TempSrc: Oral    SpO2: 96% 97%       General/Appearance: NAD, alert to person  HEENT: NCAT, PERRLA, Conjunctiva non injected, no scleral icterus. External ears and nares normal bilaterally. Mucous membranes pink and moist.   Neck: Neck is symmetrical. Trachea appears midline. Lung: clear bilaterally, normal rate and effort  Cardiac: Regular rate and rhythm, S1S2 present or without murmur or extra heart sounds  Abdomen: wounds clean. abdomen tender RUQ, epigastrium  Neuro: No gross motor or sensory deficits. Skin: No open wounds or rashes. MSK: normal ROM, no edema  Psych: normal insight, judgment    Labs  Recent Labs     09/11/19  1205   WBC 5.9   HGB 12.8*   HCT 37.6*        Recent Labs     09/11/19  1205      K 3.6      CO2 25   BUN 22*   GFRAA >60     Recent Labs     09/11/19  1205   AST 17   ALT 21     Invalid input(s): [facility], [facility], [facility], [place], [facility], [place], [place], [place], [place], EOS    Imaging  No orders to display          Assessment  Ian Garcia is a 75 y.o. male admitted for bile leak s/p lap judd    Plan    1. Bile leak  · Admit for pain control, labs,   · Clear liquids today  · NPO after MN for possible ERCP. GI has been consulted  2.  CAD, CHF  · Resume home meds    Electronically signed by Wilfredo Romero MD on 9/11/2019 at 12:52 PM

## 2019-09-12 NOTE — PLAN OF CARE
Problem: Pain:  Goal: Pain level will decrease  Description  Pain level will decrease  Outcome: Ongoing  Goal: Control of acute pain  Description  Control of acute pain  Outcome: Ongoing  Goal: Control of chronic pain  Description  Control of chronic pain  Outcome: Ongoing     Problem: Falls - Risk of:  Goal: Will remain free from falls  Description  Will remain free from falls  Outcome: Ongoing  Goal: Absence of physical injury  Description  Absence of physical injury  Outcome: Ongoing     Problem: Nutrition  Goal: Optimal nutrition therapy  9/12/2019 1332 by Marv Arrington RN  Outcome: Ongoing  9/12/2019 1129 by Bi Pacheco RD, LD  Outcome: Ongoing     Problem: Skin Integrity:  Goal: Will show no infection signs and symptoms  Description  Will show no infection signs and symptoms  Outcome: Ongoing  Goal: Absence of new skin breakdown  Description  Absence of new skin breakdown  Outcome: Ongoing

## 2019-09-12 NOTE — PROGRESS NOTES
· Monitoring: Nutrition Progression, Weight, Nausea or Vomiting, Monitor Bowel Function      Electronically signed by Donna Funes RD, LD on 9/12/19 at 11:23 AM    Contact Number: [phone]

## 2019-09-12 NOTE — ANESTHESIA PRE PROCEDURE
Ministerio Miramontes MD        enoxaparin (LOVENOX) injection 40 mg  40 mg Subcutaneous Daily Ministerio Miramontes MD   40 mg at 09/12/19 0857    0.9 % sodium chloride infusion   Intravenous Continuous Ministerio Miramontes MD 75 mL/hr at 09/12/19 0859      morphine (PF) injection 2 mg  2 mg Intravenous Q4H PRN Ministerio Miramontes MD        HYDROcodone-acetaminophen [facility]) 5-325 MG per tablet 1 tablet  1 tablet Oral Q4H PRN Ministerio Miramontes MD        Or    HYDROcodone-acetaminophen [facility]) 5-325 MG per tablet 2 tablet  2 tablet Oral Q4H PRN Ministerio Miramontes MD   2 tablet at 09/12/19 0155       Allergies:  No Known Allergies    Problem List:    Patient Active Problem List   Diagnosis Code    TIA (transient ischemic attack) G45.9    Hemorrhagic stroke ([facility] I61.9    MS (multiple sclerosis) ([facility] [address] G35    Rotator cuff tear M75.100    Lumbar disc disease with radiculopathy M51.16    Ischemic cardiomyopathy I25.5    Essential hypertension I10    Non morbid obesity due to excess calories E66.09    Hypercholesterolemia E78.00    Chronic midline low back pain without sciatica M54.5, G89.29    Coronary artery disease involving native coronary artery of native heart without angina pectoris I25.10    Major depression single episode, in partial remission ([address] F32.4    Biventricular automatic implantable cardioverter defibrillator in situ Z95.810    Cardiomyopathy ([address] I42.9    Heart failure, chronic systolic ([address] V48.17    ICD (implantable cardioverter-defibrillator), biventricular, in situ Z95.810    Erectile dysfunction of non-organic origin F52.21    SAMANHTA (obstructive sleep apnea) G47.33    Acute cholecystitis K81.0    Transaminitis R74.0    Congestive heart failure with left ventricular diastolic dysfunction, NYHA class 3 (HCC) I50.30    Bile leak, postoperative K91.89, K83.8    Bile leak K83.9       Past Medical History:        Diagnosis Date    Chronic midline low back pain without sciatica     Congestive heart failure

## 2019-09-12 NOTE — CARE COORDINATION
INITIAL CASE MANAGEMENT ASSESSMENT    Reviewed chart, met with patient to assess possible discharge needs. Explained Case Management role/services. Living Situation: lives in a single story home w/ his wife. ADLs: had been independent prior to this illness     DME: none    PT/OT Recs: [facility] level 1     Active Services: none     Transportation:      Medications: confirmed that he has medicare as primary and a supplement through [employer], rx are covered and obtained at [employer] [address]    PCP: confirmed Josephine Fritz      HD/PD: n/a    PLAN/COMMENTS: discussed [facility] since this is third admission in a month. He is agreeable. Discussed homebound requirement. Left provider list. He will defer to wifes' decision  wifes cell # [phone]    SW/CM provided contact information for patient or family to call with any questions. SW/CM will follow and assist as needed.   Electronically signed by Anna Ellington RN on 9/12/2019 at 11:18 AM

## 2019-09-12 NOTE — H&P
[address] [place] [address] [place]   Pre-operative History and Physical    Patient:   : 1955  Acct#:     HISTORY OF PRESENT ILLNESS:    The patient is a 75 y.o. male who presents with suspected bile leak for ERCP. Had bilious BRANDON drain output. Past Medical History:        Diagnosis Date    Chronic midline low back pain without sciatica     Congestive heart failure with left ventricular diastolic dysfunction, NYHA class 3 (HCC)     Coronary artery disease involving native coronary artery of native heart without angina pectoris 1-14-15    Drug Eluting Stents x 3 / Dr. Tonny Phpips Erectile dysfunction of non-organic origin     Essential hypertension     Hemorrhagic stroke ([facility]     Hypercholesterolemia     Ischemic cardiomyopathy     Dr. Delfina Gordon / Implanted ICD    Lumbar disc disease with radiculopathy     laminectomy dr Kahlil Carl  2014    Major depression single episode, in partial remission ([facility]     MS (multiple sclerosis) ([facility] [address]     Dr. Yolanda Lopez Non morbid obesity due to excess calories     Rotator cuff tear     Lt    TIA (transient ischemic attack)       Past Surgical History:        Procedure Laterality Date    CARDIAC DEFIBRILLATOR PLACEMENT  2016    CHOLECYSTECTOMY, LAPAROSCOPIC N/A 9/3/2019    LAPAROSCOPIC CHOLECYSTECTOMY performed by Reza Victoria MD at [address]  1-14-15    Drug Eluting Stents x 3    LUMBAR LAMINECTOMY  2014    dr Kurtis Nielsen      from arm      Medications Prior to Admission:   No current facility-administered medications on file prior to encounter.       Current Outpatient Medications on File Prior to Encounter   Medication Sig Dispense Refill    atorvastatin (LIPITOR) 20 MG tablet TAKE 1 TABLET EVERY DAY 90 tablet 1    vitamin B-12 (CYANOCOBALAMIN) 1000 MCG tablet Take 1,000 mcg by mouth daily      sacubitril-valsartan (ENTRESTO) 24-26 MG per tablet Take 1 tablet by mouth 2 times daily 180 tablet 4    Cholecalciferol (VITAMIN D) 2000 UNITS CAPS capsule Take 1 capsule by mouth daily.  aspirin 81 MG EC tablet Take 81 mg by mouth daily.  amoxicillin-clavulanate (AUGMENTIN) 875-125 MG per tablet Take 1 tablet by mouth 2 times daily for 7 days 14 tablet 0        Allergies:  Patient has no known allergies.     Social History:   Social History     Socioeconomic History    Marital status:      Spouse name: Not on file    Number of children: 1    Years of education: Not on file    Highest education level: Not on file   Occupational History    Occupation: Disabled   Social Needs    Financial resource strain: Not on file    Food insecurity:     Worry: Not on file     Inability: Not on file   Taplet needs:     Medical: Not on file     Non-medical: Not on file   Tobacco Use    Smoking status: Former Smoker     Last attempt to quit: 1994     Years since quittin.1    Smokeless tobacco: Former User    Tobacco comment: smoked on and off as a teen   Substance and Sexual Activity    Alcohol use: Yes     Comment: 1 can of beer every 3 months    Drug use: No    Sexual activity: Not on file   Lifestyle    Physical activity:     Days per week: Not on file     Minutes per session: Not on file    Stress: Not on file   Relationships    Social connections:     Talks on phone: Not on file     Gets together: Not on file     Attends [employer] service: Not on file     Active member of club or organization: Not on file     Attends meetings of clubs or organizations: Not on file     Relationship status: Not on file    Intimate partner violence:     Fear of current or ex partner: Not on file     Emotionally abused: Not on file     Physically abused: Not on file     Forced sexual activity: Not on file   Other Topics Concern    Not on file   Social History Narrative    Not on file      Family History:       Problem

## 2019-09-13 NOTE — PLAN OF CARE
Problem: Pain:  Goal: Pain level will decrease  Description  Pain level will decrease  9/13/2019 0011 by Alexis Morales RN  Outcome: Ongoing  9/12/2019 1332 by Janet Gr RN  Outcome: Ongoing  Goal: Control of acute pain  Description  Control of acute pain  9/13/2019 0011 by Alexis Morales RN  Outcome: Ongoing  9/12/2019 1332 by Janet Gr RN  Outcome: Ongoing  Goal: Control of chronic pain  Description  Control of chronic pain  9/13/2019 0011 by Alexis Morales RN  Outcome: Ongoing  9/12/2019 1332 by Janet Gr RN  Outcome: Ongoing     Problem: Falls - Risk of:  Goal: Will remain free from falls  Description  Will remain free from falls  9/13/2019 0011 by Alexis Morales RN  Outcome: Ongoing  9/12/2019 1332 by Janet Gr RN  Outcome: Ongoing  Goal: Absence of physical injury  Description  Absence of physical injury  9/13/2019 0011 by Alexis Morales RN  Outcome: Ongoing  9/12/2019 1332 by Janet Gr RN  Outcome: Ongoing     Problem: Nutrition  Goal: Optimal nutrition therapy  9/13/2019 0011 by Alexis Morales RN  Outcome: Ongoing  9/12/2019 1332 by Janet Gr RN  Outcome: Ongoing  9/12/2019 1129 by Majo Crow RD, LD  Outcome: Ongoing     Problem: Skin Integrity:  Goal: Will show no infection signs and symptoms  Description  Will show no infection signs and symptoms  9/13/2019 0011 by Alexis Morales RN  Outcome: Ongoing  9/12/2019 1332 by Janet Gr RN  Outcome: Ongoing  Goal: Absence of new skin breakdown  Description  Absence of new skin breakdown  9/13/2019 0011 by Alexis Morales RN  Outcome: Ongoing  9/12/2019 1332 by Janet Gr RN  Outcome: Ongoing

## 2019-09-13 NOTE — PROGRESS NOTES
INPATIENT CONSULTATION:    IDENTIFYING DATA/REASON FOR CONSULTATION   PATIENT:  Avinash Case  MRN:  [id number]  ADMIT DATE: 9/11/2019  TIME OF EVALUATION: 9/13/2019 6:54 AM  HOSPITAL STAY:   LOS: 2 days   CONSULTING PHYSICIAN: Dora Deng MD   REASON FOR CONSULTATION: concern for bile leak    Subjective:    Patient has no complaints this morning. Tolerated ERCP procedure yesterday. MEDICATIONS   SCHEDULED:    atorvastatin 20 mg Daily   sacubitril-valsartan 1 tablet BID   sodium chloride flush 10 mL 2 times per day   enoxaparin 40 mg Daily     FLUIDS/DRIPS:     sodium chloride 50 mL/hr at 09/12/19 1803     PRNs:   sodium chloride flush 10 mL PRN   acetaminophen 650 mg Q4H PRN   ondansetron 4 mg Q6H PRN   morphine 2 mg Q4H PRN   HYDROcodone 5 mg - acetaminophen 1 tablet Q4H PRN   Or     HYDROcodone 5 mg - acetaminophen 2 tablet Q4H PRN     ALLERGIES:  No Known Allergies      PHYSICAL EXAM     Vitals:    09/12/19 1647 09/12/19 2031 09/13/19 0051 09/13/19 0517   BP: (!) 169/79 (!) 140/63 (!) 120/53 (!) 171/75   Pulse: 70 85 86 65   Resp: 18 16 18 18   Temp: 97.4 °F (36.3 °C) 97.6 °F (36.4 °C) 98 °F (36.7 °C) 97.5 °F (36.4 °C)   TempSrc: Oral Oral Oral Oral   SpO2: 98% 98% 98% 94%   Weight:    197 lb 8.5 oz (89.6 kg)   Height:           I/O last 3 completed shifts: In: 900 [I.V.:900]  Out: 150 [Urine:150]    Physical Exam:  General appearance: alert, cooperative, no distress, appears stated age  Eyes: Anicteric  Head: Normocephalic, without obvious abnormality  Lungs: clear to auscultation bilaterally, Normal Effort  Heart: regular rate and rhythm, normal S1 and S2, no murmurs or rubs  Abdomen: soft, non-distended, non-tender. Bowel sounds normal. No masses,  no organomegaly.    Extremities: atraumatic, no cyanosis or edema  Skin: warm and dry, no jaundice  Neuro: Grossly intact, A&OX3    LABS AND IMAGING   Laboratory   Recent Labs     09/11/19  1205   WBC 5.9   HGB 12.8*   HCT 37.6*   MCV 89.5    Recent Labs     09/11/19  1205      K 3.6      CO2 25   BUN 22*   CREATININE 1.1     Recent Labs     09/11/19  1205   AST 17   ALT 21   BILIDIR 0.3   BILITOT 0.9   ALKPHOS 108     No results for input(s): LIPASE, AMYLASE in the last 72 hours. No results for input(s): PROTIME, INR in the last 72 hours. Imaging  FL ERCP BILIARY S&I   Final Result   ERCP images demonstrating common bile duct stent placement. Please refer to   the procedure report for further details. XR CHEST PORTABLE   Final Result   1. Left subclavian pacemaker in proper position, and appears MR compatible. 2. No acute cardiopulmonary disease. Endoscopy  ERCP 9/12/19  1. Normal cholangiogram.  Given the pre-operative concern for bile leak with bile drainage from BRANDON drain, I did perform biliary sphincterotomy and placed a 10Fr 9 cm Advantix biliary stent. ASSESSMENT AND RECOMMENDATIONS   Wild Vu is a 75 y.o. male with PMH of CAD, CHF, HTN, CVA, and recent cholocystectomy 9/3/19, IOC unable to be completed with post-operative HIDA without leak, who presents to [facility] in direct admit from the surgery clinic for concern for bile peritonitis. We have been consulted regarding concern for post-operative bile leak. ERCP completed yesterday, cholangiogram was normal, a 10Fr 9 cm Advantix biliary stent. 1. Elevated bilirubin in ascites:  ERCP did not show bile leak but with physical exams findings of bile from drain a sphincterotomy and and biliary stent was placed. Pt tolerated procedure. LFTs normal.  White count normal.  No fevers. No abd pain  2. S/p cholecystectomy. RECOMMENDATIONS:   Low fat diet as tolerated  Repeat ERCP in 4 weeks for stent removal and re-evaluation. If you have any questions or need any further information, please feel free to contact us [phone]. Thank you for allowing us to participate in the care of Wild Vu.     Jl MAHONEY

## 2019-09-13 NOTE — PROGRESS NOTES
sulema(attempted to notify RN Shelly Gordillo, yet unavailable when this writer called her, sitter cam is present )    Patient Diagnosis(es): R UQ pain, bile leak, pt pulled drain out at home      has a past medical history of Chronic midline low back pain without sciatica, Congestive heart failure with left ventricular diastolic dysfunction, NYHA class 3 ([address] Coronary artery disease involving native coronary artery of native heart without angina pectoris, Erectile dysfunction of non-organic origin, Essential hypertension, Hemorrhagic stroke ([address] Hypercholesterolemia, Ischemic cardiomyopathy, Lumbar disc disease with radiculopathy, Major depression single episode, in partial remission ([address] MS (multiple sclerosis) ([address] Non morbid obesity due to excess calories, Rotator cuff tear, and TIA (transient ischemic attack). has a past surgical history that includes Rotator cuff repair; tumor removal; lumbar laminectomy (jan 2014); Coronary angioplasty with stent (1-14-15); Cardiac defibrillator placement (12/2016); pacemaker placement; Cholecystectomy, laparoscopic (N/A, 9/3/2019); ERCP (N/A, 9/12/2019); and ERCP (N/A, 9/12/2019). Restrictions  Restrictions/Precautions: Fall Risk    Subjective  Chart Reviewed: Yes  Patient assessed for rehabilitation services?: Yes  Additional Pertinent Hx: 9/3/19  clarence whaley  Response to previous treatment: Patient with no complaints from previous session  Family / Caregiver Present: No  Diagnosis: R UQ pain, bile leak, pt pulled drain out at home  Subjective: Patient met b/s, lying supine, agreeable to OT treat, does not report or indicate any pain     Orientation  Overall Orientation Status: Impaired  Orientation Level: Oriented to person;Disoriented to place; Disoriented to time;Disoriented to situation    Objective  ADL  Grooming: Setup;Supervision;Verbal cueing(this writer assisted with hair washing, patient washes face, thompson hair, and completes oral care with transfers  Patient Goals   Patient goals :  \"To be able to move around and to golf\"       Therapy Time   Individual Concurrent Group Co-treatment   Time In 0725         Time Out 0805         Minutes 40         Timed Code Treatment Minutes: [address] [id number]

## 2019-09-16 NOTE — PROGRESS NOTES
Subjective:      Patient ID: Jeanna Bateman is a 75 y.o. male. HPI     Chronic Low Back Pain:  Patient is S/P lumbar laminectomy in 2014. Kwame Hankins takes Norco 5-325 every 6 hrs as needed. Kwame Hankins feels that the medication keeps him functional. Annette Hinds does not take it very often. Hospital Follow Up / Acute Cholecystitis:  Patient presented to ER on 8-10-19 with a 1 week history of RUQ abdominal pain. CT scan showed acute cholecystitis and CBC showed a WBC of 11.9 with left shift. He was admitted and treated with IV Zosyn. He was seen by surgery and was scheduled for cholecystectomy but was noted to have troponin elevation and surgery was canceled. He was discharged on 8-14-19 to follow up with Cardiology as an outpatient. He saw Dr. Francisco Castaneda on 8-26-19. He was scheduled for a stress test which was done on 8-28-19 after which he was cleared by Cardiology for surgery. He again presented to ER on 9-1-19 with RUQ pain, nausea, vomiting. He was again diagnosed with acute cholecystitis and admitted. He was treated with IV antibiotics and he underwent a laparoscopic cholecystectomy on 9-3-19 per Dr. Luis Don. He was discharged on 9-6-19. He was seen by Dr. Luis Don in the office on 9-11-19 and directly admitted with a postoperative bile leak. He had an ERCP done on 9-12-19 per Dr. Dasia Rodriguez showing a normal cholangiogram but a biliary sphincterotomy with stent was done. He will have a repeat ERCP in 4 weeks. He was again discharged on 9-13-19. He no longer has abdominal pain but has some diarrhea. Review of Systems   Constitutional: Negative for chills and fever. Gastrointestinal: Positive for diarrhea. Negative for abdominal pain, blood in stool, constipation, nausea and vomiting. Musculoskeletal: Positive for back pain. /79   Pulse 81   Ht 5' 6\" (1.676 m)   Wt 198 lb (89.8 kg)   BMI 31.96 kg/m²    Objective:   Physical Exam   Constitutional: He is oriented to person, place, and time.  He appears

## 2020-01-01 ENCOUNTER — VIRTUAL VISIT (OUTPATIENT)
Dept: CARDIOLOGY CLINIC | Age: 65
End: 2020-01-01
Payer: MEDICARE

## 2020-01-01 ENCOUNTER — APPOINTMENT (OUTPATIENT)
Dept: CT IMAGING | Age: 65
DRG: 246 | End: 2020-01-01
Payer: MEDICARE

## 2020-01-01 ENCOUNTER — TELEPHONE (OUTPATIENT)
Dept: FAMILY MEDICINE CLINIC | Age: 65
End: 2020-01-01

## 2020-01-01 ENCOUNTER — APPOINTMENT (OUTPATIENT)
Dept: CT IMAGING | Age: 65
End: 2020-01-01
Payer: MEDICARE

## 2020-01-01 ENCOUNTER — TELEPHONE (OUTPATIENT)
Dept: CARDIOLOGY CLINIC | Age: 65
End: 2020-01-01

## 2020-01-01 ENCOUNTER — APPOINTMENT (OUTPATIENT)
Dept: MRI IMAGING | Age: 65
DRG: 246 | End: 2020-01-01
Payer: MEDICARE

## 2020-01-01 ENCOUNTER — APPOINTMENT (OUTPATIENT)
Dept: GENERAL RADIOLOGY | Age: 65
End: 2020-01-01
Payer: MEDICARE

## 2020-01-01 ENCOUNTER — CARE COORDINATION (OUTPATIENT)
Dept: CARE COORDINATION | Age: 65
End: 2020-01-01

## 2020-01-01 ENCOUNTER — CARE COORDINATION (OUTPATIENT)
Dept: CASE MANAGEMENT | Age: 65
End: 2020-01-01

## 2020-01-01 ENCOUNTER — OFFICE VISIT (OUTPATIENT)
Dept: CARDIOLOGY CLINIC | Age: 65
End: 2020-01-01
Payer: MEDICARE

## 2020-01-01 ENCOUNTER — HOSPITAL ENCOUNTER (INPATIENT)
Age: 65
LOS: 10 days | Discharge: HOME HEALTH | DRG: 246 | End: 2020-05-22
Attending: EMERGENCY MEDICINE | Admitting: INTERNAL MEDICINE
Payer: MEDICARE

## 2020-01-01 ENCOUNTER — NURSE ONLY (OUTPATIENT)
Dept: CARDIOLOGY CLINIC | Age: 65
End: 2020-01-01
Payer: MEDICARE

## 2020-01-01 ENCOUNTER — OFFICE VISIT (OUTPATIENT)
Dept: FAMILY MEDICINE CLINIC | Age: 65
End: 2020-01-01
Payer: MEDICARE

## 2020-01-01 ENCOUNTER — TELEPHONE (OUTPATIENT)
Dept: OTHER | Facility: CLINIC | Age: 65
End: 2020-01-01

## 2020-01-01 ENCOUNTER — HOSPITAL ENCOUNTER (EMERGENCY)
Age: 65
Discharge: HOME | End: 2020-05-28
Attending: EMERGENCY MEDICINE
Payer: MEDICARE

## 2020-01-01 ENCOUNTER — TELEPHONE (OUTPATIENT)
Dept: PRIMARY CARE CLINIC | Age: 65
End: 2020-01-01

## 2020-01-01 ENCOUNTER — APPOINTMENT (OUTPATIENT)
Dept: GENERAL RADIOLOGY | Age: 65
DRG: 246 | End: 2020-01-01
Payer: MEDICARE

## 2020-01-01 VITALS
DIASTOLIC BLOOD PRESSURE: 62 MMHG | BODY MASS INDEX: 33.27 KG/M2 | WEIGHT: 207 LBS | HEIGHT: 66 IN | SYSTOLIC BLOOD PRESSURE: 110 MMHG | HEART RATE: 78 BPM | OXYGEN SATURATION: 98 %

## 2020-01-01 VITALS
BODY MASS INDEX: 31.55 KG/M2 | OXYGEN SATURATION: 99 % | SYSTOLIC BLOOD PRESSURE: 127 MMHG | DIASTOLIC BLOOD PRESSURE: 47 MMHG | RESPIRATION RATE: 16 BRPM | HEART RATE: 50 BPM | HEIGHT: 66 IN | WEIGHT: 196.31 LBS | TEMPERATURE: 97.9 F

## 2020-01-01 VITALS — WEIGHT: 209 LBS | SYSTOLIC BLOOD PRESSURE: 120 MMHG | DIASTOLIC BLOOD PRESSURE: 80 MMHG | BODY MASS INDEX: 33.73 KG/M2

## 2020-01-01 VITALS
DIASTOLIC BLOOD PRESSURE: 88 MMHG | HEART RATE: 61 BPM | BODY MASS INDEX: 34.23 KG/M2 | OXYGEN SATURATION: 98 % | SYSTOLIC BLOOD PRESSURE: 144 MMHG | HEIGHT: 66 IN | WEIGHT: 213 LBS

## 2020-01-01 VITALS
OXYGEN SATURATION: 93 % | RESPIRATION RATE: 18 BRPM | WEIGHT: 199.08 LBS | SYSTOLIC BLOOD PRESSURE: 125 MMHG | HEART RATE: 60 BPM | BODY MASS INDEX: 31.99 KG/M2 | HEIGHT: 66 IN | TEMPERATURE: 97 F | DIASTOLIC BLOOD PRESSURE: 78 MMHG

## 2020-01-01 VITALS
OXYGEN SATURATION: 98 % | BODY MASS INDEX: 33.12 KG/M2 | WEIGHT: 211 LBS | HEIGHT: 67 IN | DIASTOLIC BLOOD PRESSURE: 78 MMHG | HEART RATE: 73 BPM | SYSTOLIC BLOOD PRESSURE: 122 MMHG

## 2020-01-01 VITALS — BODY MASS INDEX: 34.38 KG/M2 | HEIGHT: 66 IN

## 2020-01-01 DIAGNOSIS — I25.5 ISCHEMIC CARDIOMYOPATHY: ICD-10-CM

## 2020-01-01 LAB
A/G RATIO: 1.6 (ref 1.1–2.2)
A/G RATIO: 1.6 (ref 1.1–2.2)
A/G RATIO: 1.7 (ref 1.1–2.2)
ALBUMIN SERPL-MCNC: 4 G/DL (ref 3.4–5)
ALBUMIN SERPL-MCNC: 4 G/DL (ref 3.4–5)
ALBUMIN SERPL-MCNC: 4.1 G/DL (ref 3.4–5)
ALP BLD-CCNC: 101 U/L (ref 40–129)
ALP BLD-CCNC: 91 U/L (ref 40–129)
ALP BLD-CCNC: 95 U/L (ref 40–129)
ALT SERPL-CCNC: 12 U/L (ref 10–40)
ALT SERPL-CCNC: 23 U/L (ref 10–40)
ALT SERPL-CCNC: 25 U/L (ref 10–40)
ALT SERPL-CCNC: 48 U/L (ref 10–40)
ANION GAP SERPL CALCULATED.3IONS-SCNC: 10 MMOL/L (ref 3–16)
ANION GAP SERPL CALCULATED.3IONS-SCNC: 10 MMOL/L (ref 3–16)
ANION GAP SERPL CALCULATED.3IONS-SCNC: 11 MMOL/L (ref 3–16)
ANION GAP SERPL CALCULATED.3IONS-SCNC: 15 MMOL/L (ref 3–16)
ANION GAP SERPL CALCULATED.3IONS-SCNC: 16 MMOL/L (ref 3–16)
ANION GAP SERPL CALCULATED.3IONS-SCNC: 19 MMOL/L (ref 3–16)
ANION GAP SERPL CALCULATED.3IONS-SCNC: 9 MMOL/L (ref 3–16)
APTT: 123.1 SEC (ref 24.2–36.2)
APTT: 135.2 SEC (ref 24.2–36.2)
APTT: 27.7 SEC (ref 24.2–36.2)
APTT: 29.1 SEC (ref 24.2–36.2)
APTT: 46.7 SEC (ref 24.2–36.2)
APTT: 48.3 SEC (ref 24.2–36.2)
APTT: 48.6 SEC (ref 24.2–36.2)
APTT: 49.8 SEC (ref 24.2–36.2)
APTT: 51.2 SEC (ref 24.2–36.2)
APTT: 52.3 SEC (ref 24.2–36.2)
APTT: 60.8 SEC (ref 24.2–36.2)
APTT: 69.1 SEC (ref 24.2–36.2)
APTT: 69.2 SEC (ref 24.2–36.2)
APTT: 70.4 SEC (ref 24.2–36.2)
APTT: 74.5 SEC (ref 24.2–36.2)
APTT: 81.6 SEC (ref 24.2–36.2)
APTT: 82.4 SEC (ref 24.2–36.2)
AST SERPL-CCNC: 12 U/L (ref 15–37)
AST SERPL-CCNC: 19 U/L (ref 15–37)
AST SERPL-CCNC: 35 U/L (ref 15–37)
AST SERPL-CCNC: 79 U/L (ref 15–37)
BASE EXCESS VENOUS: -4 MMOL/L (ref -3–3)
BASOPHILS ABSOLUTE: 0 K/UL (ref 0–0.2)
BASOPHILS ABSOLUTE: 0.1 K/UL (ref 0–0.2)
BASOPHILS ABSOLUTE: 0.1 K/UL (ref 0–0.2)
BASOPHILS RELATIVE PERCENT: 0.6 %
BASOPHILS RELATIVE PERCENT: 0.6 %
BASOPHILS RELATIVE PERCENT: 0.7 %
BETA-HYDROXYBUTYRATE: 0.5 MMOL/L (ref 0–0.27)
BILIRUB SERPL-MCNC: 0.7 MG/DL (ref 0–1)
BILIRUB SERPL-MCNC: 0.7 MG/DL (ref 0–1)
BILIRUB SERPL-MCNC: 1 MG/DL (ref 0–1)
BILIRUBIN URINE: NEGATIVE
BILIRUBIN URINE: NEGATIVE
BLOOD, URINE: ABNORMAL
BLOOD, URINE: ABNORMAL
BUN BLDV-MCNC: 11 MG/DL (ref 7–20)
BUN BLDV-MCNC: 16 MG/DL (ref 7–20)
BUN BLDV-MCNC: 16 MG/DL (ref 7–20)
BUN BLDV-MCNC: 17 MG/DL (ref 7–20)
BUN BLDV-MCNC: 19 MG/DL (ref 7–20)
BUN BLDV-MCNC: 20 MG/DL (ref 7–20)
BUN BLDV-MCNC: 21 MG/DL (ref 7–20)
BUN BLDV-MCNC: 21 MG/DL (ref 7–20)
BUN BLDV-MCNC: 31 MG/DL (ref 7–20)
CALCIUM SERPL-MCNC: 10 MG/DL (ref 8.3–10.6)
CALCIUM SERPL-MCNC: 9 MG/DL (ref 8.3–10.6)
CALCIUM SERPL-MCNC: 9.3 MG/DL (ref 8.3–10.6)
CALCIUM SERPL-MCNC: 9.5 MG/DL (ref 8.3–10.6)
CALCIUM SERPL-MCNC: 9.5 MG/DL (ref 8.3–10.6)
CALCIUM SERPL-MCNC: 9.6 MG/DL (ref 8.3–10.6)
CALCIUM SERPL-MCNC: 9.6 MG/DL (ref 8.3–10.6)
CARBOXYHEMOGLOBIN: 3.1 % (ref 0–1.5)
CHLORIDE BLD-SCNC: 100 MMOL/L (ref 99–110)
CHLORIDE BLD-SCNC: 102 MMOL/L (ref 99–110)
CHLORIDE BLD-SCNC: 103 MMOL/L (ref 99–110)
CHLORIDE BLD-SCNC: 103 MMOL/L (ref 99–110)
CHLORIDE BLD-SCNC: 105 MMOL/L (ref 99–110)
CHLORIDE BLD-SCNC: 106 MMOL/L (ref 99–110)
CHLORIDE BLD-SCNC: 98 MMOL/L (ref 99–110)
CHLORIDE BLD-SCNC: 99 MMOL/L (ref 99–110)
CHLORIDE BLD-SCNC: 99 MMOL/L (ref 99–110)
CHOLESTEROL, TOTAL: 101 MG/DL (ref 0–199)
CLARITY: ABNORMAL
CLARITY: CLEAR
CO2: 14 MMOL/L (ref 21–32)
CO2: 19 MMOL/L (ref 21–32)
CO2: 21 MMOL/L (ref 21–32)
CO2: 22 MMOL/L (ref 21–32)
CO2: 23 MMOL/L (ref 21–32)
CO2: 23 MMOL/L (ref 21–32)
CO2: 24 MMOL/L (ref 21–32)
CO2: 24 MMOL/L (ref 21–32)
CO2: 25 MMOL/L (ref 21–32)
COLOR: YELLOW
COLOR: YELLOW
CREAT SERPL-MCNC: 0.9 MG/DL (ref 0.8–1.3)
CREAT SERPL-MCNC: 1 MG/DL (ref 0.8–1.3)
CREAT SERPL-MCNC: 1.1 MG/DL (ref 0.8–1.3)
CREAT SERPL-MCNC: 1.2 MG/DL (ref 0.8–1.3)
CREAT SERPL-MCNC: 1.4 MG/DL (ref 0.8–1.3)
EKG ATRIAL RATE: 114 BPM
EKG ATRIAL RATE: 57 BPM
EKG ATRIAL RATE: 63 BPM
EKG DIAGNOSIS: NORMAL
EKG P AXIS: 112 DEGREES
EKG P AXIS: 47 DEGREES
EKG P-R INTERVAL: 110 MS
EKG P-R INTERVAL: 216 MS
EKG Q-T INTERVAL: 408 MS
EKG Q-T INTERVAL: 502 MS
EKG Q-T INTERVAL: 532 MS
EKG QRS DURATION: 122 MS
EKG QRS DURATION: 150 MS
EKG QRS DURATION: 172 MS
EKG QTC CALCULATION (BAZETT): 488 MS
EKG QTC CALCULATION (BAZETT): 544 MS
EKG QTC CALCULATION (BAZETT): 562 MS
EKG R AXIS: -28 DEGREES
EKG R AXIS: -33 DEGREES
EKG R AXIS: 255 DEGREES
EKG T AXIS: 201 DEGREES
EKG T AXIS: 59 DEGREES
EKG T AXIS: 97 DEGREES
EKG VENTRICULAR RATE: 114 BPM
EKG VENTRICULAR RATE: 57 BPM
EKG VENTRICULAR RATE: 63 BPM
EOSINOPHILS ABSOLUTE: 0 K/UL (ref 0–0.6)
EOSINOPHILS ABSOLUTE: 0.1 K/UL (ref 0–0.6)
EOSINOPHILS ABSOLUTE: 0.2 K/UL (ref 0–0.6)
EOSINOPHILS RELATIVE PERCENT: 0 %
EOSINOPHILS RELATIVE PERCENT: 1.3 %
EOSINOPHILS RELATIVE PERCENT: 2.3 %
EPITHELIAL CELLS, UA: 0 /HPF (ref 0–5)
EPITHELIAL CELLS, UA: 1 /HPF (ref 0–5)
ESTIMATED AVERAGE GLUCOSE: 108.3 MG/DL
GFR AFRICAN AMERICAN: >60
GFR NON-AFRICAN AMERICAN: 51
GFR NON-AFRICAN AMERICAN: >60
GLOBULIN: 2.4 G/DL
GLOBULIN: 2.5 G/DL
GLOBULIN: 2.6 G/DL
GLUCOSE BLD-MCNC: 109 MG/DL (ref 70–99)
GLUCOSE BLD-MCNC: 110 MG/DL (ref 70–99)
GLUCOSE BLD-MCNC: 114 MG/DL (ref 70–99)
GLUCOSE BLD-MCNC: 116 MG/DL (ref 70–99)
GLUCOSE BLD-MCNC: 116 MG/DL (ref 70–99)
GLUCOSE BLD-MCNC: 117 MG/DL (ref 70–99)
GLUCOSE BLD-MCNC: 119 MG/DL (ref 70–99)
GLUCOSE BLD-MCNC: 138 MG/DL (ref 70–99)
GLUCOSE BLD-MCNC: 153 MG/DL (ref 70–99)
GLUCOSE BLD-MCNC: 212 MG/DL (ref 70–99)
GLUCOSE BLD-MCNC: 86 MG/DL (ref 70–99)
GLUCOSE URINE: 100 MG/DL
GLUCOSE URINE: NEGATIVE MG/DL
HBA1C MFR BLD: 5.4 %
HCO3 VENOUS: 18.2 MMOL/L (ref 23–29)
HCT VFR BLD CALC: 41.5 % (ref 40.5–52.5)
HCT VFR BLD CALC: 41.5 % (ref 40.5–52.5)
HCT VFR BLD CALC: 41.8 % (ref 40.5–52.5)
HCT VFR BLD CALC: 42.5 % (ref 40.5–52.5)
HCT VFR BLD CALC: 42.8 % (ref 40.5–52.5)
HCT VFR BLD CALC: 43.1 % (ref 40.5–52.5)
HDLC SERPL-MCNC: 29 MG/DL (ref 40–60)
HEMOGLOBIN: 14 G/DL (ref 13.5–17.5)
HEMOGLOBIN: 14 G/DL (ref 13.5–17.5)
HEMOGLOBIN: 14.1 G/DL (ref 13.5–17.5)
HEMOGLOBIN: 14.3 G/DL (ref 13.5–17.5)
HEMOGLOBIN: 14.3 G/DL (ref 13.5–17.5)
HEMOGLOBIN: 14.6 G/DL (ref 13.5–17.5)
HYALINE CASTS: 2 /LPF (ref 0–8)
HYALINE CASTS: 5 /LPF (ref 0–8)
INR BLD: 1.06 (ref 0.86–1.14)
INR BLD: 1.88 (ref 0.86–1.14)
KETONES, URINE: 15 MG/DL
KETONES, URINE: NEGATIVE MG/DL
LDL CHOLESTEROL CALCULATED: 52 MG/DL
LEFT VENTRICULAR EJECTION FRACTION MODE: NORMAL
LEUKOCYTE ESTERASE, URINE: NEGATIVE
LEUKOCYTE ESTERASE, URINE: NEGATIVE
LV EF: 25 %
LV EF: 25 %
LVEF MODALITY: NORMAL
LYMPHOCYTES ABSOLUTE: 0.4 K/UL (ref 1–5.1)
LYMPHOCYTES ABSOLUTE: 0.5 K/UL (ref 1–5.1)
LYMPHOCYTES ABSOLUTE: 1.9 K/UL (ref 1–5.1)
LYMPHOCYTES RELATIVE PERCENT: 14 %
LYMPHOCYTES RELATIVE PERCENT: 3.3 %
LYMPHOCYTES RELATIVE PERCENT: 8.3 %
MAGNESIUM: 1.7 MG/DL (ref 1.8–2.4)
MAGNESIUM: 2 MG/DL (ref 1.8–2.4)
MAGNESIUM: 2.1 MG/DL (ref 1.8–2.4)
MAGNESIUM: 2.2 MG/DL (ref 1.8–2.4)
MAGNESIUM: 2.4 MG/DL (ref 1.8–2.4)
MCH RBC QN AUTO: 30.6 PG (ref 26–34)
MCH RBC QN AUTO: 30.7 PG (ref 26–34)
MCH RBC QN AUTO: 30.8 PG (ref 26–34)
MCH RBC QN AUTO: 30.8 PG (ref 26–34)
MCH RBC QN AUTO: 30.9 PG (ref 26–34)
MCH RBC QN AUTO: 31.1 PG (ref 26–34)
MCHC RBC AUTO-ENTMCNC: 33.2 G/DL (ref 31–36)
MCHC RBC AUTO-ENTMCNC: 33.7 G/DL (ref 31–36)
MCHC RBC AUTO-ENTMCNC: 33.7 G/DL (ref 31–36)
MCHC RBC AUTO-ENTMCNC: 33.8 G/DL (ref 31–36)
MCHC RBC AUTO-ENTMCNC: 33.9 G/DL (ref 31–36)
MCHC RBC AUTO-ENTMCNC: 34.2 G/DL (ref 31–36)
MCV RBC AUTO: 89.5 FL (ref 80–100)
MCV RBC AUTO: 91 FL (ref 80–100)
MCV RBC AUTO: 91.2 FL (ref 80–100)
MCV RBC AUTO: 91.4 FL (ref 80–100)
MCV RBC AUTO: 92.1 FL (ref 80–100)
MCV RBC AUTO: 92.8 FL (ref 80–100)
METHEMOGLOBIN VENOUS: 0.5 %
MICROSCOPIC EXAMINATION: YES
MICROSCOPIC EXAMINATION: YES
MONOCYTES ABSOLUTE: 0.7 K/UL (ref 0–1.3)
MONOCYTES ABSOLUTE: 0.8 K/UL (ref 0–1.3)
MONOCYTES ABSOLUTE: 1 K/UL (ref 0–1.3)
MONOCYTES RELATIVE PERCENT: 12 %
MONOCYTES RELATIVE PERCENT: 4.9 %
MONOCYTES RELATIVE PERCENT: 8.3 %
NEUTROPHILS ABSOLUTE: 10.7 K/UL (ref 1.7–7.7)
NEUTROPHILS ABSOLUTE: 11 K/UL (ref 1.7–7.7)
NEUTROPHILS ABSOLUTE: 4.9 K/UL (ref 1.7–7.7)
NEUTROPHILS RELATIVE PERCENT: 76.8 %
NEUTROPHILS RELATIVE PERCENT: 79.1 %
NEUTROPHILS RELATIVE PERCENT: 87.8 %
NITRITE, URINE: NEGATIVE
NITRITE, URINE: NEGATIVE
O2 CONTENT, VEN: 20 VOL %
O2 SAT, VEN: 100 %
O2 THERAPY: ABNORMAL
PCO2, VEN: 25.8 MMHG (ref 40–50)
PDW BLD-RTO: 13.2 % (ref 12.4–15.4)
PDW BLD-RTO: 14 % (ref 12.4–15.4)
PDW BLD-RTO: 14 % (ref 12.4–15.4)
PDW BLD-RTO: 14.1 % (ref 12.4–15.4)
PDW BLD-RTO: 14.2 % (ref 12.4–15.4)
PDW BLD-RTO: 14.3 % (ref 12.4–15.4)
PERFORMED ON: ABNORMAL
PERFORMED ON: ABNORMAL
PH UA: 5 (ref 5–8)
PH UA: 5 (ref 5–8)
PH VENOUS: 7.46 (ref 7.35–7.45)
PHOSPHORUS: 1.7 MG/DL (ref 2.5–4.9)
PHOSPHORUS: 2.1 MG/DL (ref 2.5–4.9)
PHOSPHORUS: 3 MG/DL (ref 2.5–4.9)
PHOSPHORUS: 3.5 MG/DL (ref 2.5–4.9)
PLATELET # BLD: 192 K/UL (ref 135–450)
PLATELET # BLD: 194 K/UL (ref 135–450)
PLATELET # BLD: 195 K/UL (ref 135–450)
PLATELET # BLD: 207 K/UL (ref 135–450)
PLATELET # BLD: 208 K/UL (ref 135–450)
PLATELET # BLD: 209 K/UL (ref 135–450)
PLATELET # BLD: 212 K/UL (ref 135–450)
PLATELET # BLD: 225 K/UL (ref 135–450)
PLATELET # BLD: 250 K/UL (ref 135–450)
PMV BLD AUTO: 7.9 FL (ref 5–10.5)
PMV BLD AUTO: 8 FL (ref 5–10.5)
PMV BLD AUTO: 8.2 FL (ref 5–10.5)
PMV BLD AUTO: 8.5 FL (ref 5–10.5)
PMV BLD AUTO: 8.5 FL (ref 5–10.5)
PMV BLD AUTO: 9.1 FL (ref 5–10.5)
PO2, VEN: 179 MMHG (ref 25–40)
POC ACT LR: 206 SEC
POC ACT LR: 273 SEC
POC ACT LR: 281 SEC
POC ACT LR: 320 SEC
POC ACT LR: 334 SEC
POTASSIUM REFLEX MAGNESIUM: 4.2 MMOL/L (ref 3.5–5.1)
POTASSIUM SERPL-SCNC: 3.3 MMOL/L (ref 3.5–5.1)
POTASSIUM SERPL-SCNC: 3.7 MMOL/L (ref 3.5–5.1)
POTASSIUM SERPL-SCNC: 3.8 MMOL/L (ref 3.5–5.1)
POTASSIUM SERPL-SCNC: 4 MMOL/L (ref 3.5–5.1)
POTASSIUM SERPL-SCNC: 4.1 MMOL/L (ref 3.5–5.1)
POTASSIUM SERPL-SCNC: 4.3 MMOL/L (ref 3.5–5.1)
POTASSIUM SERPL-SCNC: 4.4 MMOL/L (ref 3.5–5.1)
POTASSIUM SERPL-SCNC: 4.6 MMOL/L (ref 3.5–5.1)
PRO-BNP: 356 PG/ML (ref 0–124)
PRO-BNP: 454 PG/ML (ref 0–124)
PRO-BNP: 952 PG/ML (ref 0–124)
PROTEIN UA: 30 MG/DL
PROTEIN UA: NEGATIVE MG/DL
PROTHROMBIN TIME: 12.3 SEC (ref 10–13.2)
PROTHROMBIN TIME: 22 SEC (ref 10–13.2)
RBC # BLD: 4.5 M/UL (ref 4.2–5.9)
RBC # BLD: 4.56 M/UL (ref 4.2–5.9)
RBC # BLD: 4.57 M/UL (ref 4.2–5.9)
RBC # BLD: 4.65 M/UL (ref 4.2–5.9)
RBC # BLD: 4.66 M/UL (ref 4.2–5.9)
RBC # BLD: 4.78 M/UL (ref 4.2–5.9)
RBC UA: 1 /HPF (ref 0–4)
RBC UA: 14 /HPF (ref 0–4)
REASON FOR REJECTION: NORMAL
REASON FOR REJECTION: NORMAL
REJECTED TEST: NORMAL
REJECTED TEST: NORMAL
SODIUM BLD-SCNC: 132 MMOL/L (ref 136–145)
SODIUM BLD-SCNC: 133 MMOL/L (ref 136–145)
SODIUM BLD-SCNC: 135 MMOL/L (ref 136–145)
SODIUM BLD-SCNC: 136 MMOL/L (ref 136–145)
SODIUM BLD-SCNC: 138 MMOL/L (ref 136–145)
SODIUM BLD-SCNC: 139 MMOL/L (ref 136–145)
SPECIFIC GRAVITY UA: 1.02 (ref 1–1.03)
SPECIFIC GRAVITY UA: 1.03 (ref 1–1.03)
TCO2 CALC VENOUS: 43 MMOL/L
TOTAL PROTEIN: 6.4 G/DL (ref 6.4–8.2)
TOTAL PROTEIN: 6.5 G/DL (ref 6.4–8.2)
TOTAL PROTEIN: 6.7 G/DL (ref 6.4–8.2)
TRIGL SERPL-MCNC: 99 MG/DL (ref 0–150)
TROPONIN: 0.02 NG/ML
TROPONIN: 0.56 NG/ML
TROPONIN: 0.93 NG/ML
TROPONIN: <0.01 NG/ML
TSH REFLEX: 0.81 UIU/ML (ref 0.27–4.2)
URINE REFLEX TO CULTURE: ABNORMAL
URINE TYPE: ABNORMAL
URINE TYPE: ABNORMAL
UROBILINOGEN, URINE: 0.2 E.U./DL
UROBILINOGEN, URINE: 1 E.U./DL
VLDLC SERPL CALC-MCNC: 20 MG/DL
WBC # BLD: 12.5 K/UL (ref 4–11)
WBC # BLD: 13.5 K/UL (ref 4–11)
WBC # BLD: 5.2 K/UL (ref 4–11)
WBC # BLD: 6.1 K/UL (ref 4–11)
WBC # BLD: 6.4 K/UL (ref 4–11)
WBC # BLD: 6.7 K/UL (ref 4–11)
WBC UA: 2 /HPF (ref 0–5)
WBC UA: 3 /HPF (ref 0–5)

## 2020-01-01 PROCEDURE — G8417 CALC BMI ABV UP PARAM F/U: HCPCS | Performed by: INTERNAL MEDICINE

## 2020-01-01 PROCEDURE — 85730 THROMBOPLASTIN TIME PARTIAL: CPT

## 2020-01-01 PROCEDURE — 70553 MRI BRAIN STEM W/O & W/DYE: CPT

## 2020-01-01 PROCEDURE — 6370000000 HC RX 637 (ALT 250 FOR IP): Performed by: INTERNAL MEDICINE

## 2020-01-01 PROCEDURE — 6360000002 HC RX W HCPCS: Performed by: NURSE PRACTITIONER

## 2020-01-01 PROCEDURE — A9577 INJ MULTIHANCE: HCPCS | Performed by: PSYCHIATRY & NEUROLOGY

## 2020-01-01 PROCEDURE — 97530 THERAPEUTIC ACTIVITIES: CPT

## 2020-01-01 PROCEDURE — 92523 SPEECH SOUND LANG COMPREHEN: CPT

## 2020-01-01 PROCEDURE — 2000000000 HC ICU R&B

## 2020-01-01 PROCEDURE — 99152 MOD SED SAME PHYS/QHP 5/>YRS: CPT

## 2020-01-01 PROCEDURE — 99214 OFFICE O/P EST MOD 30 MIN: CPT | Performed by: INTERNAL MEDICINE

## 2020-01-01 PROCEDURE — 6360000004 HC RX CONTRAST MEDICATION: Performed by: INTERNAL MEDICINE

## 2020-01-01 PROCEDURE — 1036F TOBACCO NON-USER: CPT | Performed by: FAMILY MEDICINE

## 2020-01-01 PROCEDURE — 6370000000 HC RX 637 (ALT 250 FOR IP): Performed by: HOSPITALIST

## 2020-01-01 PROCEDURE — 80053 COMPREHEN METABOLIC PANEL: CPT

## 2020-01-01 PROCEDURE — 4040F PNEUMOC VAC/ADMIN/RCVD: CPT | Performed by: NURSE PRACTITIONER

## 2020-01-01 PROCEDURE — 99233 SBSQ HOSP IP/OBS HIGH 50: CPT | Performed by: INTERNAL MEDICINE

## 2020-01-01 PROCEDURE — 92610 EVALUATE SWALLOWING FUNCTION: CPT

## 2020-01-01 PROCEDURE — 94760 N-INVAS EAR/PLS OXIMETRY 1: CPT

## 2020-01-01 PROCEDURE — 92921 HC PRQ CARDIAC ANGIO ADDL ART: CPT

## 2020-01-01 PROCEDURE — 2580000003 HC RX 258: Performed by: INTERNAL MEDICINE

## 2020-01-01 PROCEDURE — 93290 INTERROG DEV EVAL ICPMS IP: CPT | Performed by: INTERNAL MEDICINE

## 2020-01-01 PROCEDURE — 92526 ORAL FUNCTION THERAPY: CPT

## 2020-01-01 PROCEDURE — 85025 COMPLETE CBC W/AUTO DIFF WBC: CPT

## 2020-01-01 PROCEDURE — 6360000002 HC RX W HCPCS: Performed by: PHYSICIAN ASSISTANT

## 2020-01-01 PROCEDURE — 93005 ELECTROCARDIOGRAM TRACING: CPT | Performed by: EMERGENCY MEDICINE

## 2020-01-01 PROCEDURE — 83735 ASSAY OF MAGNESIUM: CPT

## 2020-01-01 PROCEDURE — 80048 BASIC METABOLIC PNL TOTAL CA: CPT

## 2020-01-01 PROCEDURE — 93458 L HRT ARTERY/VENTRICLE ANGIO: CPT

## 2020-01-01 PROCEDURE — 84100 ASSAY OF PHOSPHORUS: CPT

## 2020-01-01 PROCEDURE — 1036F TOBACCO NON-USER: CPT | Performed by: INTERNAL MEDICINE

## 2020-01-01 PROCEDURE — 2580000003 HC RX 258: Performed by: PSYCHIATRY & NEUROLOGY

## 2020-01-01 PROCEDURE — 99223 1ST HOSP IP/OBS HIGH 75: CPT | Performed by: PSYCHIATRY & NEUROLOGY

## 2020-01-01 PROCEDURE — 92928 PRQ TCAT PLMT NTRAC ST 1 LES: CPT | Performed by: INTERNAL MEDICINE

## 2020-01-01 PROCEDURE — 99285 EMERGENCY DEPT VISIT HI MDM: CPT

## 2020-01-01 PROCEDURE — 93005 ELECTROCARDIOGRAM TRACING: CPT | Performed by: INTERNAL MEDICINE

## 2020-01-01 PROCEDURE — 2500000003 HC RX 250 WO HCPCS

## 2020-01-01 PROCEDURE — 6360000004 HC RX CONTRAST MEDICATION: Performed by: PSYCHIATRY & NEUROLOGY

## 2020-01-01 PROCEDURE — 99291 CRITICAL CARE FIRST HOUR: CPT | Performed by: INTERNAL MEDICINE

## 2020-01-01 PROCEDURE — A9505 TL201 THALLIUM: HCPCS | Performed by: INTERNAL MEDICINE

## 2020-01-01 PROCEDURE — 99223 1ST HOSP IP/OBS HIGH 75: CPT | Performed by: INTERNAL MEDICINE

## 2020-01-01 PROCEDURE — 83880 ASSAY OF NATRIURETIC PEPTIDE: CPT

## 2020-01-01 PROCEDURE — 2580000003 HC RX 258: Performed by: PHYSICIAN ASSISTANT

## 2020-01-01 PROCEDURE — 1123F ACP DISCUSS/DSCN MKR DOCD: CPT | Performed by: INTERNAL MEDICINE

## 2020-01-01 PROCEDURE — 36415 COLL VENOUS BLD VENIPUNCTURE: CPT

## 2020-01-01 PROCEDURE — B2111ZZ FLUOROSCOPY OF MULTIPLE CORONARY ARTERIES USING LOW OSMOLAR CONTRAST: ICD-10-PCS | Performed by: INTERNAL MEDICINE

## 2020-01-01 PROCEDURE — G8482 FLU IMMUNIZE ORDER/ADMIN: HCPCS | Performed by: FAMILY MEDICINE

## 2020-01-01 PROCEDURE — 3017F COLORECTAL CA SCREEN DOC REV: CPT | Performed by: FAMILY MEDICINE

## 2020-01-01 PROCEDURE — 02703ZZ DILATION OF CORONARY ARTERY, ONE ARTERY, PERCUTANEOUS APPROACH: ICD-10-PCS | Performed by: INTERNAL MEDICINE

## 2020-01-01 PROCEDURE — C1887 CATHETER, GUIDING: HCPCS

## 2020-01-01 PROCEDURE — 96365 THER/PROPH/DIAG IV INF INIT: CPT

## 2020-01-01 PROCEDURE — 93306 TTE W/DOPPLER COMPLETE: CPT

## 2020-01-01 PROCEDURE — 85027 COMPLETE CBC AUTOMATED: CPT

## 2020-01-01 PROCEDURE — 3017F COLORECTAL CA SCREEN DOC REV: CPT | Performed by: INTERNAL MEDICINE

## 2020-01-01 PROCEDURE — C1769 GUIDE WIRE: HCPCS

## 2020-01-01 PROCEDURE — 6370000000 HC RX 637 (ALT 250 FOR IP): Performed by: PHYSICIAN ASSISTANT

## 2020-01-01 PROCEDURE — 71045 X-RAY EXAM CHEST 1 VIEW: CPT

## 2020-01-01 PROCEDURE — 85049 AUTOMATED PLATELET COUNT: CPT

## 2020-01-01 PROCEDURE — 99232 SBSQ HOSP IP/OBS MODERATE 35: CPT | Performed by: PSYCHIATRY & NEUROLOGY

## 2020-01-01 PROCEDURE — G8417 CALC BMI ABV UP PARAM F/U: HCPCS | Performed by: FAMILY MEDICINE

## 2020-01-01 PROCEDURE — 4040F PNEUMOC VAC/ADMIN/RCVD: CPT | Performed by: INTERNAL MEDICINE

## 2020-01-01 PROCEDURE — G8482 FLU IMMUNIZE ORDER/ADMIN: HCPCS | Performed by: INTERNAL MEDICINE

## 2020-01-01 PROCEDURE — 93010 ELECTROCARDIOGRAM REPORT: CPT | Performed by: INTERNAL MEDICINE

## 2020-01-01 PROCEDURE — C9600 PERC DRUG-EL COR STENT SING: HCPCS

## 2020-01-01 PROCEDURE — 2709999900 HC NON-CHARGEABLE SUPPLY

## 2020-01-01 PROCEDURE — 2580000003 HC RX 258

## 2020-01-01 PROCEDURE — C1874 STENT, COATED/COV W/DEL SYS: HCPCS

## 2020-01-01 PROCEDURE — 92978 ENDOLUMINL IVUS OCT C 1ST: CPT | Performed by: INTERNAL MEDICINE

## 2020-01-01 PROCEDURE — B2151ZZ FLUOROSCOPY OF LEFT HEART USING LOW OSMOLAR CONTRAST: ICD-10-PCS | Performed by: INTERNAL MEDICINE

## 2020-01-01 PROCEDURE — C1894 INTRO/SHEATH, NON-LASER: HCPCS

## 2020-01-01 PROCEDURE — 6360000002 HC RX W HCPCS

## 2020-01-01 PROCEDURE — 6370000000 HC RX 637 (ALT 250 FOR IP)

## 2020-01-01 PROCEDURE — G8427 DOCREV CUR MEDS BY ELIG CLIN: HCPCS | Performed by: FAMILY MEDICINE

## 2020-01-01 PROCEDURE — 93000 ELECTROCARDIOGRAM COMPLETE: CPT | Performed by: INTERNAL MEDICINE

## 2020-01-01 PROCEDURE — 36415 COLL VENOUS BLD VENIPUNCTURE: CPT | Performed by: FAMILY MEDICINE

## 2020-01-01 PROCEDURE — 93296 REM INTERROG EVL PM/IDS: CPT | Performed by: INTERNAL MEDICINE

## 2020-01-01 PROCEDURE — C1753 CATH, INTRAVAS ULTRASOUND: HCPCS

## 2020-01-01 PROCEDURE — C9601 PERC DRUG-EL COR STENT BRAN: HCPCS

## 2020-01-01 PROCEDURE — 027135Z DILATION OF CORONARY ARTERY, TWO ARTERIES WITH TWO DRUG-ELUTING INTRALUMINAL DEVICES, PERCUTANEOUS APPROACH: ICD-10-PCS | Performed by: INTERNAL MEDICINE

## 2020-01-01 PROCEDURE — 4A023N7 MEASUREMENT OF CARDIAC SAMPLING AND PRESSURE, LEFT HEART, PERCUTANEOUS APPROACH: ICD-10-PCS | Performed by: INTERNAL MEDICINE

## 2020-01-01 PROCEDURE — 84484 ASSAY OF TROPONIN QUANT: CPT

## 2020-01-01 PROCEDURE — 99153 MOD SED SAME PHYS/QHP EA: CPT

## 2020-01-01 PROCEDURE — 2580000003 HC RX 258: Performed by: HOSPITALIST

## 2020-01-01 PROCEDURE — 83036 HEMOGLOBIN GLYCOSYLATED A1C: CPT

## 2020-01-01 PROCEDURE — 85610 PROTHROMBIN TIME: CPT

## 2020-01-01 PROCEDURE — 82010 KETONE BODYS QUAN: CPT

## 2020-01-01 PROCEDURE — 2500000003 HC RX 250 WO HCPCS: Performed by: HOSPITALIST

## 2020-01-01 PROCEDURE — 4040F PNEUMOC VAC/ADMIN/RCVD: CPT | Performed by: FAMILY MEDICINE

## 2020-01-01 PROCEDURE — 81001 URINALYSIS AUTO W/SCOPE: CPT

## 2020-01-01 PROCEDURE — 85347 COAGULATION TIME ACTIVATED: CPT

## 2020-01-01 PROCEDURE — 78452 HT MUSCLE IMAGE SPECT MULT: CPT | Performed by: INTERNAL MEDICINE

## 2020-01-01 PROCEDURE — C1725 CATH, TRANSLUMIN NON-LASER: HCPCS

## 2020-01-01 PROCEDURE — 1123F ACP DISCUSS/DSCN MKR DOCD: CPT | Performed by: FAMILY MEDICINE

## 2020-01-01 PROCEDURE — 1123F ACP DISCUSS/DSCN MKR DOCD: CPT | Performed by: NURSE PRACTITIONER

## 2020-01-01 PROCEDURE — 97535 SELF CARE MNGMENT TRAINING: CPT

## 2020-01-01 PROCEDURE — 99214 OFFICE O/P EST MOD 30 MIN: CPT | Performed by: NURSE PRACTITIONER

## 2020-01-01 PROCEDURE — 97116 GAIT TRAINING THERAPY: CPT

## 2020-01-01 PROCEDURE — G8427 DOCREV CUR MEDS BY ELIG CLIN: HCPCS | Performed by: INTERNAL MEDICINE

## 2020-01-01 PROCEDURE — 70450 CT HEAD/BRAIN W/O DYE: CPT

## 2020-01-01 PROCEDURE — G8417 CALC BMI ABV UP PARAM F/U: HCPCS | Performed by: NURSE PRACTITIONER

## 2020-01-01 PROCEDURE — 84443 ASSAY THYROID STIM HORMONE: CPT

## 2020-01-01 PROCEDURE — G8482 FLU IMMUNIZE ORDER/ADMIN: HCPCS | Performed by: NURSE PRACTITIONER

## 2020-01-01 PROCEDURE — B240ZZ3 ULTRASONOGRAPHY OF SINGLE CORONARY ARTERY, INTRAVASCULAR: ICD-10-PCS | Performed by: INTERNAL MEDICINE

## 2020-01-01 PROCEDURE — G8427 DOCREV CUR MEDS BY ELIG CLIN: HCPCS | Performed by: NURSE PRACTITIONER

## 2020-01-01 PROCEDURE — 1036F TOBACCO NON-USER: CPT | Performed by: NURSE PRACTITIONER

## 2020-01-01 PROCEDURE — 82803 BLOOD GASES ANY COMBINATION: CPT

## 2020-01-01 PROCEDURE — 3430000000 HC RX DIAGNOSTIC RADIOPHARMACEUTICAL: Performed by: INTERNAL MEDICINE

## 2020-01-01 PROCEDURE — 97165 OT EVAL LOW COMPLEX 30 MIN: CPT

## 2020-01-01 PROCEDURE — 99222 1ST HOSP IP/OBS MODERATE 55: CPT | Performed by: INTERNAL MEDICINE

## 2020-01-01 PROCEDURE — 99024 POSTOP FOLLOW-UP VISIT: CPT | Performed by: INTERNAL MEDICINE

## 2020-01-01 PROCEDURE — 97161 PT EVAL LOW COMPLEX 20 MIN: CPT

## 2020-01-01 PROCEDURE — 93284 PRGRMG EVAL IMPLANTABLE DFB: CPT | Performed by: INTERNAL MEDICINE

## 2020-01-01 PROCEDURE — 3017F COLORECTAL CA SCREEN DOC REV: CPT | Performed by: NURSE PRACTITIONER

## 2020-01-01 PROCEDURE — 92978 ENDOLUMINL IVUS OCT C 1ST: CPT

## 2020-01-01 PROCEDURE — 93295 DEV INTERROG REMOTE 1/2/MLT: CPT | Performed by: INTERNAL MEDICINE

## 2020-01-01 PROCEDURE — 99213 OFFICE O/P EST LOW 20 MIN: CPT | Performed by: NURSE PRACTITIONER

## 2020-01-01 PROCEDURE — 99214 OFFICE O/P EST MOD 30 MIN: CPT | Performed by: FAMILY MEDICINE

## 2020-01-01 PROCEDURE — 92929 PR PRQ TRLUML CORONARY STENT W/ANGIO ADDL ART/BRNCH: CPT | Performed by: INTERNAL MEDICINE

## 2020-01-01 RX ORDER — OXYCODONE HYDROCHLORIDE AND ACETAMINOPHEN 5; 325 MG/1; MG/1
2 TABLET ORAL EVERY 4 HOURS PRN
Status: DISCONTINUED | OUTPATIENT
Start: 2020-01-01 | End: 2020-01-01 | Stop reason: HOSPADM

## 2020-01-01 RX ORDER — HYDROCODONE BITARTRATE AND ACETAMINOPHEN 5; 325 MG/1; MG/1
1 TABLET ORAL EVERY 6 HOURS PRN
COMMUNITY

## 2020-01-01 RX ORDER — HEPARIN SODIUM 1000 [USP'U]/ML
4000 INJECTION, SOLUTION INTRAVENOUS; SUBCUTANEOUS ONCE
Status: COMPLETED | OUTPATIENT
Start: 2020-01-01 | End: 2020-01-01

## 2020-01-01 RX ORDER — POTASSIUM CHLORIDE 20 MEQ/1
20 TABLET, EXTENDED RELEASE ORAL
Status: DISCONTINUED | OUTPATIENT
Start: 2020-01-01 | End: 2020-01-01 | Stop reason: HOSPADM

## 2020-01-01 RX ORDER — ASPIRIN 81 MG/1
81 TABLET ORAL DAILY
Status: DISCONTINUED | OUTPATIENT
Start: 2020-01-01 | End: 2020-01-01 | Stop reason: SDUPTHER

## 2020-01-01 RX ORDER — AMIODARONE HYDROCHLORIDE 200 MG/1
200 TABLET ORAL 2 TIMES DAILY
Status: DISCONTINUED | OUTPATIENT
Start: 2020-01-01 | End: 2020-01-01

## 2020-01-01 RX ORDER — SODIUM CHLORIDE 0.9 % (FLUSH) 0.9 %
10 SYRINGE (ML) INJECTION PRN
Status: DISCONTINUED | OUTPATIENT
Start: 2020-01-01 | End: 2020-01-01 | Stop reason: HOSPADM

## 2020-01-01 RX ORDER — ATORVASTATIN CALCIUM 20 MG/1
20 TABLET, FILM COATED ORAL DAILY
Qty: 30 TABLET | Refills: 3 | Status: SHIPPED | OUTPATIENT
Start: 2020-01-01

## 2020-01-01 RX ORDER — CARVEDILOL 6.25 MG/1
12.5 TABLET ORAL 2 TIMES DAILY
Status: DISCONTINUED | OUTPATIENT
Start: 2020-01-01 | End: 2020-01-01

## 2020-01-01 RX ORDER — FUROSEMIDE 40 MG/1
40 TABLET ORAL 2 TIMES DAILY
Qty: 6 TABLET | Refills: 0 | Status: SHIPPED | OUTPATIENT
Start: 2020-01-01 | End: 2020-01-01

## 2020-01-01 RX ORDER — HEPARIN SODIUM 10000 [USP'U]/100ML
10.53 INJECTION, SOLUTION INTRAVENOUS CONTINUOUS
Status: DISCONTINUED | OUTPATIENT
Start: 2020-01-01 | End: 2020-01-01

## 2020-01-01 RX ORDER — DEXTROSE MONOHYDRATE 25 G/50ML
12.5 INJECTION, SOLUTION INTRAVENOUS PRN
Status: DISCONTINUED | OUTPATIENT
Start: 2020-01-01 | End: 2020-01-01 | Stop reason: ALTCHOICE

## 2020-01-01 RX ORDER — CARVEDILOL 6.25 MG/1
6.25 TABLET ORAL 2 TIMES DAILY WITH MEALS
COMMUNITY
End: 2020-01-01 | Stop reason: DRUGHIGH

## 2020-01-01 RX ORDER — AMIODARONE HYDROCHLORIDE 200 MG/1
200 TABLET ORAL ONCE
Status: COMPLETED | OUTPATIENT
Start: 2020-01-01 | End: 2020-01-01

## 2020-01-01 RX ORDER — ACETAMINOPHEN 325 MG/1
650 TABLET ORAL EVERY 4 HOURS PRN
Status: DISCONTINUED | OUTPATIENT
Start: 2020-01-01 | End: 2020-01-01 | Stop reason: HOSPADM

## 2020-01-01 RX ORDER — ONDANSETRON 2 MG/ML
4 INJECTION INTRAMUSCULAR; INTRAVENOUS EVERY 6 HOURS PRN
Status: DISCONTINUED | OUTPATIENT
Start: 2020-01-01 | End: 2020-01-01 | Stop reason: SDUPTHER

## 2020-01-01 RX ORDER — CARVEDILOL 3.12 MG/1
3.12 TABLET ORAL 2 TIMES DAILY
Status: DISCONTINUED | OUTPATIENT
Start: 2020-01-01 | End: 2020-01-01

## 2020-01-01 RX ORDER — ACETAMINOPHEN 325 MG/1
650 TABLET ORAL EVERY 6 HOURS PRN
Status: DISCONTINUED | OUTPATIENT
Start: 2020-01-01 | End: 2020-01-01 | Stop reason: ALTCHOICE

## 2020-01-01 RX ORDER — MAGNESIUM SULFATE IN WATER 40 MG/ML
INJECTION, SOLUTION INTRAVENOUS
Status: COMPLETED
Start: 2020-01-01 | End: 2020-01-01

## 2020-01-01 RX ORDER — POLYETHYLENE GLYCOL 3350 17 G/17G
17 POWDER, FOR SOLUTION ORAL DAILY PRN
Status: DISCONTINUED | OUTPATIENT
Start: 2020-01-01 | End: 2020-01-01 | Stop reason: HOSPADM

## 2020-01-01 RX ORDER — AMIODARONE HYDROCHLORIDE 200 MG/1
400 TABLET ORAL 2 TIMES DAILY
Status: DISCONTINUED | OUTPATIENT
Start: 2020-01-01 | End: 2020-01-01

## 2020-01-01 RX ORDER — CLOPIDOGREL 300 MG/1
600 TABLET, FILM COATED ORAL ONCE
Status: COMPLETED | OUTPATIENT
Start: 2020-01-01 | End: 2020-01-01

## 2020-01-01 RX ORDER — INSULIN LISPRO 100 [IU]/ML
0-3 INJECTION, SOLUTION INTRAVENOUS; SUBCUTANEOUS NIGHTLY
Status: DISCONTINUED | OUTPATIENT
Start: 2020-01-01 | End: 2020-01-01 | Stop reason: ALTCHOICE

## 2020-01-01 RX ORDER — AMIODARONE HYDROCHLORIDE 200 MG/1
TABLET ORAL
Status: COMPLETED
Start: 2020-01-01 | End: 2020-01-01

## 2020-01-01 RX ORDER — MAGNESIUM SULFATE IN WATER 40 MG/ML
2 INJECTION, SOLUTION INTRAVENOUS ONCE
Status: COMPLETED | OUTPATIENT
Start: 2020-01-01 | End: 2020-01-01

## 2020-01-01 RX ORDER — OXYCODONE HYDROCHLORIDE AND ACETAMINOPHEN 5; 325 MG/1; MG/1
1 TABLET ORAL EVERY 4 HOURS PRN
Status: DISCONTINUED | OUTPATIENT
Start: 2020-01-01 | End: 2020-01-01 | Stop reason: HOSPADM

## 2020-01-01 RX ORDER — AMIODARONE HYDROCHLORIDE 200 MG/1
200 TABLET ORAL DAILY
Status: DISCONTINUED | OUTPATIENT
Start: 2020-01-01 | End: 2020-01-01 | Stop reason: HOSPADM

## 2020-01-01 RX ORDER — CARVEDILOL 6.25 MG/1
3.12 TABLET ORAL DAILY
Qty: 30 TABLET | Refills: 0
Start: 2020-01-01 | End: 2020-01-01

## 2020-01-01 RX ORDER — CARVEDILOL 3.12 MG/1
3.12 TABLET ORAL 2 TIMES DAILY
Qty: 60 TABLET | Refills: 3 | Status: ON HOLD
Start: 2020-01-01 | End: 2020-01-01 | Stop reason: HOSPADM

## 2020-01-01 RX ORDER — ONDANSETRON 2 MG/ML
4 INJECTION INTRAMUSCULAR; INTRAVENOUS EVERY 6 HOURS PRN
Status: DISCONTINUED | OUTPATIENT
Start: 2020-01-01 | End: 2020-01-01 | Stop reason: HOSPADM

## 2020-01-01 RX ORDER — SODIUM CHLORIDE 0.9 % (FLUSH) 0.9 %
10 SYRINGE (ML) INJECTION EVERY 12 HOURS SCHEDULED
Status: DISCONTINUED | OUTPATIENT
Start: 2020-01-01 | End: 2020-01-01 | Stop reason: HOSPADM

## 2020-01-01 RX ORDER — POTASSIUM CHLORIDE 7.45 MG/ML
10 INJECTION INTRAVENOUS PRN
Status: DISCONTINUED | OUTPATIENT
Start: 2020-01-01 | End: 2020-01-01 | Stop reason: HOSPADM

## 2020-01-01 RX ORDER — ATORVASTATIN CALCIUM 20 MG/1
20 TABLET, FILM COATED ORAL DAILY
Status: DISCONTINUED | OUTPATIENT
Start: 2020-01-01 | End: 2020-01-01 | Stop reason: HOSPADM

## 2020-01-01 RX ORDER — LANOLIN ALCOHOL/MO/W.PET/CERES
1000 CREAM (GRAM) TOPICAL DAILY
Status: DISCONTINUED | OUTPATIENT
Start: 2020-01-01 | End: 2020-01-01 | Stop reason: HOSPADM

## 2020-01-01 RX ORDER — VITAMIN B COMPLEX
2000 TABLET ORAL DAILY
Status: DISCONTINUED | OUTPATIENT
Start: 2020-01-01 | End: 2020-01-01 | Stop reason: HOSPADM

## 2020-01-01 RX ORDER — AMIODARONE HYDROCHLORIDE 200 MG/1
200 TABLET ORAL DAILY
Qty: 30 TABLET | Refills: 3 | Status: SHIPPED | OUTPATIENT
Start: 2020-01-01

## 2020-01-01 RX ORDER — POTASSIUM CHLORIDE 20 MEQ/1
40 TABLET, EXTENDED RELEASE ORAL
Status: COMPLETED | OUTPATIENT
Start: 2020-01-01 | End: 2020-01-01

## 2020-01-01 RX ORDER — ACETAMINOPHEN 650 MG/1
650 SUPPOSITORY RECTAL EVERY 6 HOURS PRN
Status: DISCONTINUED | OUTPATIENT
Start: 2020-01-01 | End: 2020-01-01 | Stop reason: HOSPADM

## 2020-01-01 RX ORDER — FUROSEMIDE 20 MG/1
20 TABLET ORAL DAILY
Qty: 60 TABLET | Refills: 3 | Status: SHIPPED | OUTPATIENT
Start: 2020-01-01

## 2020-01-01 RX ORDER — INSULIN LISPRO 100 [IU]/ML
0-6 INJECTION, SOLUTION INTRAVENOUS; SUBCUTANEOUS
Status: DISCONTINUED | OUTPATIENT
Start: 2020-01-01 | End: 2020-01-01 | Stop reason: ALTCHOICE

## 2020-01-01 RX ORDER — CARVEDILOL 6.25 MG/1
6.25 TABLET ORAL 2 TIMES DAILY
Status: DISCONTINUED | OUTPATIENT
Start: 2020-01-01 | End: 2020-01-01

## 2020-01-01 RX ORDER — ASPIRIN 325 MG
325 TABLET ORAL ONCE
Status: COMPLETED | OUTPATIENT
Start: 2020-01-01 | End: 2020-01-01

## 2020-01-01 RX ORDER — HEPARIN SODIUM 1000 [USP'U]/ML
2000 INJECTION, SOLUTION INTRAVENOUS; SUBCUTANEOUS PRN
Status: DISCONTINUED | OUTPATIENT
Start: 2020-01-01 | End: 2020-01-01

## 2020-01-01 RX ORDER — SACUBITRIL AND VALSARTAN 49; 51 MG/1; MG/1
1 TABLET, FILM COATED ORAL 2 TIMES DAILY
Qty: 60 TABLET | Refills: 3 | Status: ON HOLD | OUTPATIENT
Start: 2020-01-01 | End: 2020-01-01 | Stop reason: HOSPADM

## 2020-01-01 RX ORDER — VENLAFAXINE HYDROCHLORIDE 37.5 MG/1
75 CAPSULE, EXTENDED RELEASE ORAL
Status: DISCONTINUED | OUTPATIENT
Start: 2020-01-01 | End: 2020-01-01 | Stop reason: HOSPADM

## 2020-01-01 RX ORDER — SODIUM CHLORIDE 0.9 % (FLUSH) 0.9 %
10 SYRINGE (ML) INJECTION EVERY 12 HOURS SCHEDULED
Status: DISCONTINUED | OUTPATIENT
Start: 2020-01-01 | End: 2020-01-01 | Stop reason: SDUPTHER

## 2020-01-01 RX ORDER — DEXTROSE MONOHYDRATE 50 MG/ML
100 INJECTION, SOLUTION INTRAVENOUS PRN
Status: DISCONTINUED | OUTPATIENT
Start: 2020-01-01 | End: 2020-01-01 | Stop reason: ALTCHOICE

## 2020-01-01 RX ORDER — VENLAFAXINE HYDROCHLORIDE 75 MG/1
75 CAPSULE, EXTENDED RELEASE ORAL
Qty: 30 CAPSULE | Refills: 3 | Status: SHIPPED | OUTPATIENT
Start: 2020-01-01

## 2020-01-01 RX ORDER — HYDROCODONE BITARTRATE AND ACETAMINOPHEN 5; 325 MG/1; MG/1
1 TABLET ORAL EVERY 6 HOURS PRN
Qty: 120 TABLET | Refills: 0 | Status: SHIPPED | OUTPATIENT
Start: 2020-01-01 | End: 2020-01-01

## 2020-01-01 RX ORDER — FUROSEMIDE 20 MG/1
20 TABLET ORAL DAILY
Status: DISCONTINUED | OUTPATIENT
Start: 2020-01-01 | End: 2020-01-01 | Stop reason: HOSPADM

## 2020-01-01 RX ORDER — CLOPIDOGREL BISULFATE 75 MG/1
75 TABLET ORAL DAILY
Status: DISCONTINUED | OUTPATIENT
Start: 2020-01-01 | End: 2020-01-01 | Stop reason: HOSPADM

## 2020-01-01 RX ORDER — HEPARIN SODIUM 1000 [USP'U]/ML
4000 INJECTION, SOLUTION INTRAVENOUS; SUBCUTANEOUS PRN
Status: DISCONTINUED | OUTPATIENT
Start: 2020-01-01 | End: 2020-01-01

## 2020-01-01 RX ORDER — FUROSEMIDE 20 MG/1
20 TABLET ORAL DAILY
Qty: 90 TABLET | Refills: 3 | Status: ON HOLD | OUTPATIENT
Start: 2020-01-01 | End: 2020-01-01 | Stop reason: HOSPADM

## 2020-01-01 RX ORDER — SODIUM BICARBONATE 650 MG/1
1300 TABLET ORAL 4 TIMES DAILY
Status: DISPENSED | OUTPATIENT
Start: 2020-01-01 | End: 2020-01-01

## 2020-01-01 RX ORDER — POTASSIUM CHLORIDE 7.45 MG/ML
10 INJECTION INTRAVENOUS ONCE
Status: DISCONTINUED | OUTPATIENT
Start: 2020-01-01 | End: 2020-01-01

## 2020-01-01 RX ORDER — POTASSIUM CHLORIDE 20 MEQ/1
40 TABLET, EXTENDED RELEASE ORAL PRN
Status: DISCONTINUED | OUTPATIENT
Start: 2020-01-01 | End: 2020-01-01 | Stop reason: HOSPADM

## 2020-01-01 RX ORDER — ASPIRIN 81 MG/1
81 TABLET ORAL DAILY
Status: DISCONTINUED | OUTPATIENT
Start: 2020-01-01 | End: 2020-01-01

## 2020-01-01 RX ORDER — OXYCODONE HYDROCHLORIDE AND ACETAMINOPHEN 5; 325 MG/1; MG/1
1 TABLET ORAL EVERY 4 HOURS PRN
Status: DISCONTINUED | OUTPATIENT
Start: 2020-01-01 | End: 2020-01-01 | Stop reason: SDUPTHER

## 2020-01-01 RX ORDER — HYDROCODONE BITARTRATE AND ACETAMINOPHEN 5; 325 MG/1; MG/1
1 TABLET ORAL EVERY 6 HOURS PRN
Status: DISCONTINUED | OUTPATIENT
Start: 2020-01-01 | End: 2020-01-01 | Stop reason: ALTCHOICE

## 2020-01-01 RX ORDER — POTASSIUM CHLORIDE 20 MEQ/1
10 TABLET, EXTENDED RELEASE ORAL DAILY
Qty: 90 TABLET | Refills: 3 | Status: SHIPPED | OUTPATIENT
Start: 2020-01-01

## 2020-01-01 RX ORDER — SODIUM CHLORIDE 0.9 % (FLUSH) 0.9 %
10 SYRINGE (ML) INJECTION ONCE
Status: COMPLETED | OUTPATIENT
Start: 2020-01-01 | End: 2020-01-01

## 2020-01-01 RX ORDER — ACETAMINOPHEN 325 MG/1
650 TABLET ORAL EVERY 4 HOURS PRN
Status: DISCONTINUED | OUTPATIENT
Start: 2020-01-01 | End: 2020-01-01 | Stop reason: SDUPTHER

## 2020-01-01 RX ORDER — THALLOUS CHLORIDE TL-201 1 MCI/ML
4 INJECTION, POWDER, LYOPHILIZED, FOR SOLUTION INTRAVENOUS
Status: COMPLETED | OUTPATIENT
Start: 2020-01-01 | End: 2020-01-01

## 2020-01-01 RX ORDER — CARVEDILOL 6.25 MG/1
12.5 TABLET ORAL 2 TIMES DAILY
Status: DISCONTINUED | OUTPATIENT
Start: 2020-01-01 | End: 2020-01-01 | Stop reason: HOSPADM

## 2020-01-01 RX ORDER — SODIUM CHLORIDE 0.9 % (FLUSH) 0.9 %
10 SYRINGE (ML) INJECTION PRN
Status: DISCONTINUED | OUTPATIENT
Start: 2020-01-01 | End: 2020-01-01

## 2020-01-01 RX ORDER — NICOTINE POLACRILEX 4 MG
15 LOZENGE BUCCAL PRN
Status: DISCONTINUED | OUTPATIENT
Start: 2020-01-01 | End: 2020-01-01 | Stop reason: ALTCHOICE

## 2020-01-01 RX ORDER — SACUBITRIL AND VALSARTAN 49; 51 MG/1; MG/1
1 TABLET, FILM COATED ORAL 2 TIMES DAILY
Qty: 60 TABLET | Refills: 0 | Status: SHIPPED | OUTPATIENT
Start: 2020-01-01

## 2020-01-01 RX ORDER — CARVEDILOL 12.5 MG/1
12.5 TABLET ORAL 2 TIMES DAILY
Qty: 60 TABLET | Refills: 3 | Status: SHIPPED | OUTPATIENT
Start: 2020-01-01

## 2020-01-01 RX ORDER — SODIUM CHLORIDE 9 MG/ML
INJECTION, SOLUTION INTRAVENOUS CONTINUOUS
Status: ACTIVE | OUTPATIENT
Start: 2020-01-01 | End: 2020-01-01

## 2020-01-01 RX ORDER — OXYCODONE HYDROCHLORIDE AND ACETAMINOPHEN 5; 325 MG/1; MG/1
2 TABLET ORAL EVERY 4 HOURS PRN
Status: DISCONTINUED | OUTPATIENT
Start: 2020-01-01 | End: 2020-01-01 | Stop reason: SDUPTHER

## 2020-01-01 RX ORDER — CLOPIDOGREL BISULFATE 75 MG/1
75 TABLET ORAL DAILY
Qty: 30 TABLET | Refills: 3 | Status: SHIPPED | OUTPATIENT
Start: 2020-01-01

## 2020-01-01 RX ORDER — SODIUM CHLORIDE 0.9 % (FLUSH) 0.9 %
10 SYRINGE (ML) INJECTION PRN
Status: DISCONTINUED | OUTPATIENT
Start: 2020-01-01 | End: 2020-01-01 | Stop reason: SDUPTHER

## 2020-01-01 RX ORDER — SODIUM CHLORIDE 0.9 % (FLUSH) 0.9 %
10 SYRINGE (ML) INJECTION EVERY 12 HOURS SCHEDULED
Status: DISCONTINUED | OUTPATIENT
Start: 2020-01-01 | End: 2020-01-01

## 2020-01-01 RX ADMIN — POTASSIUM CHLORIDE 20 MEQ: 1500 TABLET, EXTENDED RELEASE ORAL at 09:51

## 2020-01-01 RX ADMIN — SODIUM BICARBONATE 1300 MG: 650 TABLET ORAL at 17:34

## 2020-01-01 RX ADMIN — SODIUM PHOSPHATE, MONOBASIC, MONOHYDRATE 15 MMOL: 276; 142 INJECTION, SOLUTION INTRAVENOUS at 13:07

## 2020-01-01 RX ADMIN — POTASSIUM CHLORIDE 20 MEQ: 1500 TABLET, EXTENDED RELEASE ORAL at 10:37

## 2020-01-01 RX ADMIN — ASPIRIN 81 MG: 81 TABLET, COATED ORAL at 08:41

## 2020-01-01 RX ADMIN — Medication 10 ML: at 20:48

## 2020-01-01 RX ADMIN — VENLAFAXINE HYDROCHLORIDE 75 MG: 37.5 CAPSULE, EXTENDED RELEASE ORAL at 10:09

## 2020-01-01 RX ADMIN — Medication 10 ML: at 08:42

## 2020-01-01 RX ADMIN — Medication 10 ML: at 20:55

## 2020-01-01 RX ADMIN — CARVEDILOL 12.5 MG: 6.25 TABLET, FILM COATED ORAL at 20:59

## 2020-01-01 RX ADMIN — FUROSEMIDE 20 MG: 20 TABLET ORAL at 08:29

## 2020-01-01 RX ADMIN — CARVEDILOL 6.25 MG: 6.25 TABLET, FILM COATED ORAL at 22:11

## 2020-01-01 RX ADMIN — POTASSIUM CHLORIDE 20 MEQ: 1500 TABLET, EXTENDED RELEASE ORAL at 10:10

## 2020-01-01 RX ADMIN — APIXABAN 5 MG: 5 TABLET, FILM COATED ORAL at 08:49

## 2020-01-01 RX ADMIN — ATORVASTATIN CALCIUM 20 MG: 20 TABLET, FILM COATED ORAL at 20:46

## 2020-01-01 RX ADMIN — Medication 10 ML: at 08:56

## 2020-01-01 RX ADMIN — POTASSIUM CHLORIDE 20 MEQ: 1500 TABLET, EXTENDED RELEASE ORAL at 08:32

## 2020-01-01 RX ADMIN — HEPARIN SODIUM 8 UNITS/KG/HR: 10000 INJECTION, SOLUTION INTRAVENOUS at 17:14

## 2020-01-01 RX ADMIN — Medication 10 ML: at 09:17

## 2020-01-01 RX ADMIN — CYANOCOBALAMIN TAB 1000 MCG 1000 MCG: 1000 TAB at 09:16

## 2020-01-01 RX ADMIN — CARVEDILOL 3.12 MG: 3.12 TABLET, FILM COATED ORAL at 09:52

## 2020-01-01 RX ADMIN — Medication 10 ML: at 19:54

## 2020-01-01 RX ADMIN — ATORVASTATIN CALCIUM 20 MG: 20 TABLET, FILM COATED ORAL at 20:59

## 2020-01-01 RX ADMIN — CARVEDILOL 3.12 MG: 3.12 TABLET, FILM COATED ORAL at 09:16

## 2020-01-01 RX ADMIN — ASPIRIN 81 MG: 81 TABLET, COATED ORAL at 10:33

## 2020-01-01 RX ADMIN — ASPIRIN 81 MG: 81 TABLET, COATED ORAL at 09:17

## 2020-01-01 RX ADMIN — SACUBITRIL AND VALSARTAN 1 TABLET: 49; 51 TABLET, FILM COATED ORAL at 09:53

## 2020-01-01 RX ADMIN — MELATONIN 2000 UNITS: at 08:29

## 2020-01-01 RX ADMIN — SACUBITRIL AND VALSARTAN 1 TABLET: 49; 51 TABLET, FILM COATED ORAL at 20:39

## 2020-01-01 RX ADMIN — AMIODARONE HYDROCHLORIDE 0.5 MG/MIN: 50 INJECTION, SOLUTION INTRAVENOUS at 06:20

## 2020-01-01 RX ADMIN — FUROSEMIDE 20 MG: 20 TABLET ORAL at 09:17

## 2020-01-01 RX ADMIN — POTASSIUM CHLORIDE 20 MEQ: 1500 TABLET, EXTENDED RELEASE ORAL at 08:56

## 2020-01-01 RX ADMIN — ATORVASTATIN CALCIUM 20 MG: 20 TABLET, FILM COATED ORAL at 21:43

## 2020-01-01 RX ADMIN — CARVEDILOL 12.5 MG: 6.25 TABLET, FILM COATED ORAL at 20:46

## 2020-01-01 RX ADMIN — VENLAFAXINE HYDROCHLORIDE 75 MG: 37.5 CAPSULE, EXTENDED RELEASE ORAL at 08:57

## 2020-01-01 RX ADMIN — Medication 10 ML: at 09:30

## 2020-01-01 RX ADMIN — POTASSIUM CHLORIDE 20 MEQ: 1500 TABLET, EXTENDED RELEASE ORAL at 08:41

## 2020-01-01 RX ADMIN — Medication 10 ML: at 10:35

## 2020-01-01 RX ADMIN — HEPARIN SODIUM 10.53 UNITS/KG/HR: 10000 INJECTION, SOLUTION INTRAVENOUS at 20:26

## 2020-01-01 RX ADMIN — ATORVASTATIN CALCIUM 20 MG: 20 TABLET, FILM COATED ORAL at 22:11

## 2020-01-01 RX ADMIN — AMIODARONE HYDROCHLORIDE 200 MG: 200 TABLET ORAL at 08:23

## 2020-01-01 RX ADMIN — SACUBITRIL AND VALSARTAN 1 TABLET: 49; 51 TABLET, FILM COATED ORAL at 08:41

## 2020-01-01 RX ADMIN — POTASSIUM CHLORIDE 20 MEQ: 1500 TABLET, EXTENDED RELEASE ORAL at 17:35

## 2020-01-01 RX ADMIN — HEPARIN SODIUM 10.53 UNITS/KG/HR: 10000 INJECTION, SOLUTION INTRAVENOUS at 19:52

## 2020-01-01 RX ADMIN — Medication 10 ML: at 13:19

## 2020-01-01 RX ADMIN — CARVEDILOL 12.5 MG: 6.25 TABLET, FILM COATED ORAL at 08:40

## 2020-01-01 RX ADMIN — CYANOCOBALAMIN TAB 1000 MCG 1000 MCG: 1000 TAB at 08:29

## 2020-01-01 RX ADMIN — CARVEDILOL 3.12 MG: 3.12 TABLET, FILM COATED ORAL at 20:16

## 2020-01-01 RX ADMIN — HEPARIN SODIUM 10.5 UNITS/KG/HR: 10000 INJECTION, SOLUTION INTRAVENOUS at 18:40

## 2020-01-01 RX ADMIN — AMIODARONE HYDROCHLORIDE 200 MG: 200 TABLET ORAL at 20:32

## 2020-01-01 RX ADMIN — Medication 10 ML: at 21:41

## 2020-01-01 RX ADMIN — Medication 10 ML: at 12:08

## 2020-01-01 RX ADMIN — VENLAFAXINE HYDROCHLORIDE 75 MG: 37.5 CAPSULE, EXTENDED RELEASE ORAL at 12:08

## 2020-01-01 RX ADMIN — FUROSEMIDE 20 MG: 20 TABLET ORAL at 08:57

## 2020-01-01 RX ADMIN — THALLOUS CHLORIDE TL-201 4 MILLICURIE: 1 INJECTION, POWDER, LYOPHILIZED, FOR SOLUTION INTRAVENOUS at 07:50

## 2020-01-01 RX ADMIN — HEPARIN SODIUM 14 UNITS/KG/HR: 10000 INJECTION, SOLUTION INTRAVENOUS at 12:10

## 2020-01-01 RX ADMIN — AMIODARONE HYDROCHLORIDE 400 MG: 200 TABLET ORAL at 08:29

## 2020-01-01 RX ADMIN — AMIODARONE HYDROCHLORIDE 200 MG: 200 TABLET ORAL at 10:35

## 2020-01-01 RX ADMIN — OXYCODONE HYDROCHLORIDE AND ACETAMINOPHEN 1 TABLET: 5; 325 TABLET ORAL at 20:16

## 2020-01-01 RX ADMIN — GADOBENATE DIMEGLUMINE 20 ML: 529 INJECTION, SOLUTION INTRAVENOUS at 13:18

## 2020-01-01 RX ADMIN — ASPIRIN 81 MG: 81 TABLET, COATED ORAL at 09:52

## 2020-01-01 RX ADMIN — CARVEDILOL 3.12 MG: 3.12 TABLET, FILM COATED ORAL at 01:10

## 2020-01-01 RX ADMIN — CARVEDILOL 12.5 MG: 6.25 TABLET, FILM COATED ORAL at 08:22

## 2020-01-01 RX ADMIN — Medication 10 ML: at 08:17

## 2020-01-01 RX ADMIN — FUROSEMIDE 20 MG: 20 TABLET ORAL at 10:34

## 2020-01-01 RX ADMIN — POTASSIUM CHLORIDE 20 MEQ: 1500 TABLET, EXTENDED RELEASE ORAL at 08:23

## 2020-01-01 RX ADMIN — VENLAFAXINE HYDROCHLORIDE 75 MG: 37.5 CAPSULE, EXTENDED RELEASE ORAL at 10:31

## 2020-01-01 RX ADMIN — SACUBITRIL AND VALSARTAN 1 TABLET: 49; 51 TABLET, FILM COATED ORAL at 20:46

## 2020-01-01 RX ADMIN — ATORVASTATIN CALCIUM 20 MG: 20 TABLET, FILM COATED ORAL at 20:16

## 2020-01-01 RX ADMIN — IOPAMIDOL 137 ML: 755 INJECTION, SOLUTION INTRAVENOUS at 16:39

## 2020-01-01 RX ADMIN — SACUBITRIL AND VALSARTAN 1 TABLET: 49; 51 TABLET, FILM COATED ORAL at 08:56

## 2020-01-01 RX ADMIN — AMIODARONE HYDROCHLORIDE 1 MG/MIN: 50 INJECTION, SOLUTION INTRAVENOUS at 21:16

## 2020-01-01 RX ADMIN — CYANOCOBALAMIN TAB 1000 MCG 1000 MCG: 1000 TAB at 08:40

## 2020-01-01 RX ADMIN — CARVEDILOL 12.5 MG: 6.25 TABLET, FILM COATED ORAL at 10:04

## 2020-01-01 RX ADMIN — ATORVASTATIN CALCIUM 20 MG: 20 TABLET, FILM COATED ORAL at 20:38

## 2020-01-01 RX ADMIN — APIXABAN 5 MG: 5 TABLET, FILM COATED ORAL at 20:54

## 2020-01-01 RX ADMIN — ASPIRIN 81 MG: 81 TABLET, COATED ORAL at 08:57

## 2020-01-01 RX ADMIN — AMIODARONE HYDROCHLORIDE 200 MG: 200 TABLET ORAL at 08:49

## 2020-01-01 RX ADMIN — MELATONIN 2000 UNITS: at 10:06

## 2020-01-01 RX ADMIN — SODIUM BICARBONATE 1300 MG: 650 TABLET ORAL at 09:17

## 2020-01-01 RX ADMIN — APIXABAN 5 MG: 5 TABLET, FILM COATED ORAL at 08:34

## 2020-01-01 RX ADMIN — MAGNESIUM SULFATE HEPTAHYDRATE 2 G: 40 INJECTION, SOLUTION INTRAVENOUS at 21:12

## 2020-01-01 RX ADMIN — CARVEDILOL 3.12 MG: 3.12 TABLET, FILM COATED ORAL at 17:46

## 2020-01-01 RX ADMIN — FUROSEMIDE 20 MG: 20 TABLET ORAL at 08:41

## 2020-01-01 RX ADMIN — MELATONIN 2000 UNITS: at 09:53

## 2020-01-01 RX ADMIN — POTASSIUM CHLORIDE 20 MEQ: 1500 TABLET, EXTENDED RELEASE ORAL at 08:29

## 2020-01-01 RX ADMIN — SACUBITRIL AND VALSARTAN 1 TABLET: 49; 51 TABLET, FILM COATED ORAL at 09:16

## 2020-01-01 RX ADMIN — SACUBITRIL AND VALSARTAN 1 TABLET: 49; 51 TABLET, FILM COATED ORAL at 08:22

## 2020-01-01 RX ADMIN — SACUBITRIL AND VALSARTAN 1 TABLET: 49; 51 TABLET, FILM COATED ORAL at 21:40

## 2020-01-01 RX ADMIN — SACUBITRIL AND VALSARTAN 1 TABLET: 49; 51 TABLET, FILM COATED ORAL at 20:59

## 2020-01-01 RX ADMIN — CLOPIDOGREL 75 MG: 75 TABLET, FILM COATED ORAL at 08:41

## 2020-01-01 RX ADMIN — MELATONIN 2000 UNITS: at 09:16

## 2020-01-01 RX ADMIN — SACUBITRIL AND VALSARTAN 1 TABLET: 49; 51 TABLET, FILM COATED ORAL at 20:17

## 2020-01-01 RX ADMIN — FUROSEMIDE 20 MG: 20 TABLET ORAL at 09:52

## 2020-01-01 RX ADMIN — AMIODARONE HYDROCHLORIDE 400 MG: 200 TABLET ORAL at 20:38

## 2020-01-01 RX ADMIN — CARVEDILOL 12.5 MG: 6.25 TABLET, FILM COATED ORAL at 08:30

## 2020-01-01 RX ADMIN — AMIODARONE HYDROCHLORIDE 0.5 MG/MIN: 50 INJECTION, SOLUTION INTRAVENOUS at 22:22

## 2020-01-01 RX ADMIN — AMIODARONE HYDROCHLORIDE 400 MG: 200 TABLET ORAL at 10:10

## 2020-01-01 RX ADMIN — Medication 10 ML: at 22:35

## 2020-01-01 RX ADMIN — AMIODARONE HYDROCHLORIDE 400 MG: 200 TABLET ORAL at 20:46

## 2020-01-01 RX ADMIN — MAGNESIUM SULFATE IN WATER 2 G: 40 INJECTION, SOLUTION INTRAVENOUS at 21:12

## 2020-01-01 RX ADMIN — VENLAFAXINE HYDROCHLORIDE 75 MG: 37.5 CAPSULE, EXTENDED RELEASE ORAL at 08:40

## 2020-01-01 RX ADMIN — MELATONIN 2000 UNITS: at 08:57

## 2020-01-01 RX ADMIN — SODIUM CHLORIDE: 9 INJECTION, SOLUTION INTRAVENOUS at 17:36

## 2020-01-01 RX ADMIN — FUROSEMIDE 20 MG: 20 TABLET ORAL at 08:33

## 2020-01-01 RX ADMIN — Medication 10 ML: at 21:00

## 2020-01-01 RX ADMIN — CYANOCOBALAMIN TAB 1000 MCG 1000 MCG: 1000 TAB at 10:37

## 2020-01-01 RX ADMIN — HEPARIN SODIUM 4000 UNITS: 1000 INJECTION, SOLUTION INTRAVENOUS; SUBCUTANEOUS at 02:58

## 2020-01-01 RX ADMIN — AMIODARONE HYDROCHLORIDE 400 MG: 200 TABLET ORAL at 21:39

## 2020-01-01 RX ADMIN — CARVEDILOL 12.5 MG: 6.25 TABLET, FILM COATED ORAL at 20:38

## 2020-01-01 RX ADMIN — SODIUM CHLORIDE: 9 INJECTION, SOLUTION INTRAVENOUS at 14:36

## 2020-01-01 RX ADMIN — AMIODARONE HYDROCHLORIDE 200 MG: 200 TABLET ORAL at 17:34

## 2020-01-01 RX ADMIN — SACUBITRIL AND VALSARTAN 1 TABLET: 49; 51 TABLET, FILM COATED ORAL at 08:35

## 2020-01-01 RX ADMIN — SODIUM BICARBONATE 1300 MG: 650 TABLET ORAL at 23:34

## 2020-01-01 RX ADMIN — FUROSEMIDE 20 MG: 20 TABLET ORAL at 10:08

## 2020-01-01 RX ADMIN — Medication 10 ML: at 10:41

## 2020-01-01 RX ADMIN — Medication 10 ML: at 09:16

## 2020-01-01 RX ADMIN — HEPARIN SODIUM 10.53 UNITS/KG/HR: 10000 INJECTION, SOLUTION INTRAVENOUS at 02:59

## 2020-01-01 RX ADMIN — AMIODARONE HYDROCHLORIDE 200 MG: 200 TABLET ORAL at 09:17

## 2020-01-01 RX ADMIN — SACUBITRIL AND VALSARTAN 1 TABLET: 49; 51 TABLET, FILM COATED ORAL at 10:32

## 2020-01-01 RX ADMIN — MELATONIN 2000 UNITS: at 08:23

## 2020-01-01 RX ADMIN — ASPIRIN 81 MG: 81 TABLET, COATED ORAL at 10:07

## 2020-01-01 RX ADMIN — MELATONIN 2000 UNITS: at 08:42

## 2020-01-01 RX ADMIN — VENLAFAXINE HYDROCHLORIDE 75 MG: 37.5 CAPSULE, EXTENDED RELEASE ORAL at 08:29

## 2020-01-01 RX ADMIN — CARVEDILOL 12.5 MG: 6.25 TABLET, FILM COATED ORAL at 10:51

## 2020-01-01 RX ADMIN — CARVEDILOL 12.5 MG: 6.25 TABLET, FILM COATED ORAL at 08:56

## 2020-01-01 RX ADMIN — CYANOCOBALAMIN TAB 1000 MCG 1000 MCG: 1000 TAB at 12:08

## 2020-01-01 RX ADMIN — HEPARIN SODIUM 10.53 UNITS/KG/HR: 10000 INJECTION, SOLUTION INTRAVENOUS at 00:20

## 2020-01-01 RX ADMIN — ASPIRIN 325 MG ORAL TABLET 325 MG: 325 PILL ORAL at 23:34

## 2020-01-01 RX ADMIN — IOPAMIDOL 76 ML: 755 INJECTION, SOLUTION INTRAVENOUS at 14:12

## 2020-01-01 RX ADMIN — CYANOCOBALAMIN TAB 1000 MCG 1000 MCG: 1000 TAB at 08:57

## 2020-01-01 RX ADMIN — CARVEDILOL 12.5 MG: 6.25 TABLET, FILM COATED ORAL at 20:54

## 2020-01-01 RX ADMIN — POTASSIUM CHLORIDE 40 MEQ: 1500 TABLET, EXTENDED RELEASE ORAL at 23:33

## 2020-01-01 RX ADMIN — CLOPIDOGREL 75 MG: 75 TABLET, FILM COATED ORAL at 08:24

## 2020-01-01 RX ADMIN — Medication 10 ML: at 20:39

## 2020-01-01 RX ADMIN — Medication 10 ML: at 08:26

## 2020-01-01 RX ADMIN — SACUBITRIL AND VALSARTAN 1 TABLET: 49; 51 TABLET, FILM COATED ORAL at 10:10

## 2020-01-01 RX ADMIN — HEPARIN SODIUM 10 UNITS/KG/HR: 10000 INJECTION, SOLUTION INTRAVENOUS at 06:45

## 2020-01-01 RX ADMIN — HEPARIN SODIUM 10.53 UNITS/KG/HR: 10000 INJECTION, SOLUTION INTRAVENOUS at 18:14

## 2020-01-01 RX ADMIN — SODIUM PHOSPHATE, MONOBASIC, MONOHYDRATE 20 MMOL: 276; 142 INJECTION, SOLUTION INTRAVENOUS at 09:15

## 2020-01-01 RX ADMIN — Medication 10 ML: at 08:31

## 2020-01-01 RX ADMIN — AMIODARONE HYDROCHLORIDE 0.5 MG/MIN: 50 INJECTION, SOLUTION INTRAVENOUS at 03:23

## 2020-01-01 RX ADMIN — CLOPIDOGREL BISULFATE 600 MG: 300 TABLET, FILM COATED ORAL at 10:28

## 2020-01-01 RX ADMIN — SACUBITRIL AND VALSARTAN 1 TABLET: 49; 51 TABLET, FILM COATED ORAL at 08:29

## 2020-01-01 RX ADMIN — Medication 10 ML: at 20:33

## 2020-01-01 RX ADMIN — MELATONIN 2000 UNITS: at 10:34

## 2020-01-01 RX ADMIN — Medication 10 ML: at 22:13

## 2020-01-01 RX ADMIN — HEPARIN SODIUM 2000 UNITS: 1000 INJECTION, SOLUTION INTRAVENOUS; SUBCUTANEOUS at 06:45

## 2020-01-01 RX ADMIN — AMIODARONE HYDROCHLORIDE 400 MG: 200 TABLET ORAL at 10:33

## 2020-01-01 RX ADMIN — MELATONIN 2000 UNITS: at 17:34

## 2020-01-01 RX ADMIN — SACUBITRIL AND VALSARTAN 1 TABLET: 49; 51 TABLET, FILM COATED ORAL at 20:32

## 2020-01-01 RX ADMIN — SACUBITRIL AND VALSARTAN 1 TABLET: 49; 51 TABLET, FILM COATED ORAL at 22:11

## 2020-01-01 RX ADMIN — POTASSIUM CHLORIDE 20 MEQ: 1500 TABLET, EXTENDED RELEASE ORAL at 12:08

## 2020-01-01 RX ADMIN — AMIODARONE HYDROCHLORIDE 400 MG: 200 TABLET ORAL at 20:59

## 2020-01-01 RX ADMIN — POTASSIUM CHLORIDE 40 MEQ: 1500 TABLET, EXTENDED RELEASE ORAL at 02:58

## 2020-01-01 RX ADMIN — AMIODARONE HYDROCHLORIDE 400 MG: 200 TABLET ORAL at 09:52

## 2020-01-01 RX ADMIN — CYANOCOBALAMIN TAB 1000 MCG 1000 MCG: 1000 TAB at 10:08

## 2020-01-01 RX ADMIN — CYANOCOBALAMIN TAB 1000 MCG 1000 MCG: 1000 TAB at 09:17

## 2020-01-01 RX ADMIN — CARVEDILOL 12.5 MG: 6.25 TABLET, FILM COATED ORAL at 21:40

## 2020-01-01 RX ADMIN — ATORVASTATIN CALCIUM 20 MG: 20 TABLET, FILM COATED ORAL at 20:32

## 2020-01-01 RX ADMIN — AMIODARONE HYDROCHLORIDE 400 MG: 200 TABLET ORAL at 08:56

## 2020-01-01 RX ADMIN — SACUBITRIL AND VALSARTAN 1 TABLET: 49; 51 TABLET, FILM COATED ORAL at 20:54

## 2020-01-01 RX ADMIN — ASPIRIN 81 MG: 81 TABLET, COATED ORAL at 08:29

## 2020-01-01 RX ADMIN — Medication 10 ML: at 08:33

## 2020-01-01 RX ADMIN — AMIODARONE HYDROCHLORIDE 400 MG: 200 TABLET ORAL at 22:11

## 2020-01-01 RX ADMIN — AMIODARONE HYDROCHLORIDE 400 MG: 200 TABLET ORAL at 20:17

## 2020-01-01 RX ADMIN — ATORVASTATIN CALCIUM 20 MG: 20 TABLET, FILM COATED ORAL at 20:54

## 2020-01-01 RX ADMIN — FUROSEMIDE 20 MG: 20 TABLET ORAL at 08:23

## 2020-01-01 RX ADMIN — HEPARIN SODIUM 8 UNITS/KG/HR: 10000 INJECTION, SOLUTION INTRAVENOUS at 19:29

## 2020-01-01 RX ADMIN — Medication 10 ML: at 17:35

## 2020-01-01 RX ADMIN — SACUBITRIL AND VALSARTAN 1 TABLET: 49; 51 TABLET, FILM COATED ORAL at 01:10

## 2020-01-01 ASSESSMENT — ENCOUNTER SYMPTOMS
DIARRHEA: 0
ABDOMINAL PAIN: 0
ABDOMINAL PAIN: 0
CONSTIPATION: 0
NAUSEA: 0
COLOR CHANGE: 0
CHEST TIGHTNESS: 0
WHEEZING: 0
COUGH: 0
RESPIRATORY NEGATIVE: 1
PHOTOPHOBIA: 0
RHINORRHEA: 0
NAUSEA: 0
SHORTNESS OF BREATH: 0
VOMITING: 0
DIARRHEA: 0
VOMITING: 0
WHEEZING: 0
COUGH: 0
SHORTNESS OF BREATH: 0
STRIDOR: 0
SHORTNESS OF BREATH: 0
BACK PAIN: 0

## 2020-01-01 ASSESSMENT — PAIN SCALES - GENERAL
PAINLEVEL_OUTOF10: 0

## 2020-01-08 NOTE — LETTER
Reviewed. Denies family history of sudden cardiac death, arrhythmia, premature CAD    Review of System:    · General ROS: negative for - chills, fever   · Psychological ROS: negative for - anxiety or depression  · Ophthalmic ROS: negative for - eye pain or loss of vision  · ENT ROS: negative for - epistaxis, headaches, nasal discharge, sore throat   · Allergy and Immunology ROS: negative for - hives, nasal congestion   · Hematological and Lymphatic ROS: negative for - bleeding problems, blood clots, bruising or jaundice  · Endocrine ROS: negative for - skin changes, temperature intolerance or unexpected weight changes  · Respiratory ROS: negative for - cough, hemoptysis, pleuritic pain, SOB, sputum changes or wheezing  · Cardiovascular ROS: Per HPI. · Gastrointestinal ROS: negative for - abdominal pain, blood in stools, diarrhea, hematemesis, melena, nausea/vomiting or swallowing difficulty/pain  · Genito-Urinary ROS: negative for - dysuria or incontinence  · Musculoskeletal ROS: negative for - joint swelling or muscle pain  · Neurological ROS: negative for - confusion, dizziness, gait disturbance, headaches, numbness/tingling, seizures, speech problems, tremors, visual changes or weakness  · Dermatological ROS: negative for - rash    Physical Examination:  There were no vitals filed for this visit. · Constitutional: Oriented. No distress. · Head: Normocephalic and atraumatic. · Mouth/Throat: Oropharynx is clear and moist.   · Eyes: Conjunctivae normal. EOM are normal.   · Neck: Normal range of motion. Neck supple. No rigidity. JVD present 13/14 cm. · Cardiovascular: Normal rate, paced rhythm, S1&S2 and intact distal pulses. · Pulmonary/Chest: Bilateral respiratory sounds clear. No wheezes. No rhonchi. · Abdominal: Soft. Bowel sounds present. No distension, No tenderness. · Musculoskeletal: No tenderness. 1-2 + BLE edema  · Lymphadenopathy: Has no cervical adenopathy. · Neurological: Alert and oriented. Cranial nerve appears intact, No Gross deficit   · Skin: Skin is warm and dry. No rash noted. · Psychiatric: Has a normal mood, affect and behavior     Labs:  Reviewed. ECG: reviewed, Electronic ventricular pacemaker with underlying sinus rhythm, v-rate of 67 bpm.    Studies:   1. Event monitor:   None on file     2. Echo: 6/30/17: Moderately dilated LV with EF 20-25%; Distal septal wall thinning with akinesis. Postero lateral and inferior akinesis as well. Mild mitral regurgitation is present. Mildly dilated left atrium. Trace aortic regurgitation is present. The RV is mildly enlarged with mildly reduced EF. Pacer / ICD wire is visualized in it. 3. Stress Test:  12/30/2014  Large inferior defect consistent with scar   No reversible ischemia   Dilated LV with severely reduced LV function   Non-diagnostic EKG response due to baseline abnormalities .      4. Cath: 1/12/2015  Severe LV systolic dysfunction with LVEF 30%. Basal to mid inferior wall akinesis. Short occlusion of large dominant Circumflex proximally with collaterals from right. Successful PCI using 2.75 X 18 and 2.75 X 38mm Xience Alpine CORBY resulting in STEFFI 3 flow and 0% residual stenosis. PCI of 75% large OM1 branch with 2.75mm X 8mm Xience Alpine CORBY dilated to 3mm.     The MCOT, echocardiogram, stress test, and coronary angiography/PCI were reviewed by myself and used for my plan of care. Procedures:  1. [employer] BiV ICD implanted 12/16/16 by Dr Rohan Parks    Assessment/Plan:     Ischemic cardiomyopathy   -s/p BiV ICD (12/16/16)   -Device interrogated today showed normal device function. Estimated longevity   -BiV paced 100 %   7 episodes of VT recorded   On Entresto 24/26 mg daily  His Carvedilol 6.25 mg BID was discontinued on 8/26/19 by Dr. Ema Huang due to excessive daytime sleepiness. We will restart carvedilol at 3.125 mg BID.  If he is unable to tolerate

## 2020-01-08 NOTE — PROGRESS NOTES
[address]   Cardiac Electrophysiology Consultation   Date: 1/8/2020  Reason for Consultation: ICD shock   Consult Requesting Physician: Dr. Michael Brand MD  Primary Care Physician: Xiao Vilchis DO    Chief Complaint:   No chief complaint on file. HPI: Heidy Rowe is a 75 y.o. with a past medical history of CHF, HTN, HLD, CAD s/p PCI who presents today for management of his ICD after he received a shocked for VT noted on his interogation 4/16/19. He typically follows with Dr. Maverick Zazueta for his routine cardiology care. He had a BiV ICD placed 12/16/16. Interval History: Today, he is accompanied by his wife for device management. He device interrogation revealed 2 episodes of VT/VF last occurred on 10/28/19 lasting 15 seconds at rate of 400 BPM and he was successful cardioverted by his ICD to AS/BV. He also had 5 treated  VT/VF which were successfully terminated with burst ATP. Last episode occurred on 12/24/19. It also showed increased Optivol level from 9/16/19-12/14/19. He is compliant with his medications and tolerating them well. He denies chest pain/pressure, tightness, shortness of breath, heart racing, palpitations, lightheadedness, dizziness, syncope, presyncope,  PND or orthopnea.      Past Medical History:   Diagnosis Date    Chronic midline low back pain without sciatica     Congestive heart failure with left ventricular diastolic dysfunction, NYHA class 3 (HCC)     Coronary artery disease involving native coronary artery of native heart without angina pectoris 1-14-15    Drug Eluting Stents x 3 / Dr. Luisa Ornelas Erectile dysfunction of non-organic origin     Essential hypertension     Hemorrhagic stroke ([address]     Hypercholesterolemia     Ischemic cardiomyopathy     Dr. Maverick Zazueta / Implanted ICD    Lumbar disc disease with radiculopathy     laminectomy dr Mell Villela  1/2014    Major depression single episode, in partial remission ([address]     MS (multiple sclerosis) ([address] Reviewed. Denies family history of sudden cardiac death, arrhythmia, premature CAD    Review of System:    · General ROS: negative for - chills, fever   · Psychological ROS: negative for - anxiety or depression  · Ophthalmic ROS: negative for - eye pain or loss of vision  · ENT ROS: negative for - epistaxis, headaches, nasal discharge, sore throat   · Allergy and Immunology ROS: negative for - hives, nasal congestion   · Hematological and Lymphatic ROS: negative for - bleeding problems, blood clots, bruising or jaundice  · Endocrine ROS: negative for - skin changes, temperature intolerance or unexpected weight changes  · Respiratory ROS: negative for - cough, hemoptysis, pleuritic pain, SOB, sputum changes or wheezing  · Cardiovascular ROS: Per HPI. · Gastrointestinal ROS: negative for - abdominal pain, blood in stools, diarrhea, hematemesis, melena, nausea/vomiting or swallowing difficulty/pain  · Genito-Urinary ROS: negative for - dysuria or incontinence  · Musculoskeletal ROS: negative for - joint swelling or muscle pain  · Neurological ROS: negative for - confusion, dizziness, gait disturbance, headaches, numbness/tingling, seizures, speech problems, tremors, visual changes or weakness  · Dermatological ROS: negative for - rash    Physical Examination:  There were no vitals filed for this visit. · Constitutional: Oriented. No distress. · Head: Normocephalic and atraumatic. · Mouth/Throat: Oropharynx is clear and moist.   · Eyes: Conjunctivae normal. EOM are normal.   · Neck: Normal range of motion. Neck supple. No rigidity. JVD present 13/14 cm. · Cardiovascular: Normal rate, paced rhythm, S1&S2 and intact distal pulses. · Pulmonary/Chest: Bilateral respiratory sounds clear. No wheezes. No rhonchi. · Abdominal: Soft. Bowel sounds present. No distension, No tenderness. · Musculoskeletal: No tenderness. 1-2 + BLE edema  · Lymphadenopathy: Has no cervical adenopathy.    · Neurological: Alert and oriented. Cranial nerve appears intact, No Gross deficit   · Skin: Skin is warm and dry. No rash noted. · Psychiatric: Has a normal mood, affect and behavior     Labs:  Reviewed. ECG: reviewed, Electronic ventricular pacemaker with underlying sinus rhythm, v-rate of 67 bpm.    Studies:   1. Event monitor:   None on file     2. Echo: 6/30/17: Moderately dilated LV with EF 20-25%; Distal septal wall thinning with akinesis. Postero lateral and inferior akinesis as well. Mild mitral regurgitation is present. Mildly dilated left atrium. Trace aortic regurgitation is present. The RV is mildly enlarged with mildly reduced EF. Pacer / ICD wire is visualized in it. 3. Stress Test:  12/30/2014  Large inferior defect consistent with scar   No reversible ischemia   Dilated LV with severely reduced LV function   Non-diagnostic EKG response due to baseline abnormalities .      4. Cath: 1/12/2015  Severe LV systolic dysfunction with LVEF 30%. Basal to mid inferior wall akinesis. Short occlusion of large dominant Circumflex proximally with collaterals from right. Successful PCI using 2.75 X 18 and 2.75 X 38mm Xience Alpine CORBY resulting in STEFFI 3 flow and 0% residual stenosis. PCI of 75% large OM1 branch with 2.75mm X 8mm Xience Alpine CORBY dilated to 3mm.     The MCOT, echocardiogram, stress test, and coronary angiography/PCI were reviewed by myself and used for my plan of care. Procedures:  1. [employer] BiV ICD implanted 12/16/16 by Dr Alis Connor    Assessment/Plan:     Ischemic cardiomyopathy   -s/p BiV ICD (12/16/16)   -Device interrogated today showed normal device function. Estimated longevity 6.4 years.   -BiV paced 100 %   -Device interrogation also revealed 2 episodes of VT/VF last occurred on 10/28/19 lasting 15 seconds at rate of 400 BPM and he was successful cardioverted by his ICD to AS/BV. He also had 5 treated  VT/VF which were successfully terminated with burst ATP. Last episode occurred on 12/24/19. -Device interogation also revealed increase in Optivol level. He appears to be fluid overloaded during today's exam 1-2 + BLE extremity edema 13-14 cm of JVD. -On Entresto 24/26 mg daily  -His Carvedilol 6.25 mg BID was discontinued on 8/26/19 by Dr. Miguel Hernandez due to excessive daytime sleepiness.   -We will restart carvedilol at 3.125 mg BID. If he is unable to tolerate will will DC carvedilol and start on Toprol.   -Start Lasix 40 mg BID for 3 days then check BMP, BNP and CBC on Friday, Follow up with Pia Casillas NP on Tues 1/14/20. Follow up in 6 months. Thank you for allowing me to participate in the care of Natalee Marley. This note was scribed in the presence of Dr. Caitlin Siddiqui MD by David Cruz RN. The scribe's documentation has been prepared under my direction and personally reviewed by me in its entirety. I confirm that the note above accurately reflects all work, physical examination, the discussion of treatments and procedures, and medical decision making performed by me.     Caitlin Siddiqui MD, MS, [facility]  Cardiac Electrophysiology  [address]  [address] [facility], [address] [place] [address] [address] [facility] [address]  [phone]

## 2020-01-10 NOTE — PROGRESS NOTES
Device interrogation performed by company representative. Please see scanned/PaceArt report for details. Report reviewed by Dr. Nolberto Alberts. Per Rep's Note:  - NSR  - 2 VT/VF episodes w/ shock. Last 10/28/19, lasting 15 sec. V-rate 400bpm. Successful conversion to AS/BVp.   - 5 treated VT/VF events. Successfully terminated with burst ATP.  Last: 12/24/19.  - increased OptiVol 9/16/19-12/14/19  - BiV pace: 99.9%

## 2020-01-14 PROBLEM — I47.29 VENTRICULAR TACHYCARDIA, NONSUSTAINED: Status: ACTIVE | Noted: 2020-01-01

## 2020-01-14 NOTE — PROGRESS NOTES
(1-14-15); Cardiac defibrillator placement (12/2016); pacemaker placement; Cholecystectomy, laparoscopic (N/A, 9/3/2019); ERCP (N/A, 9/12/2019); ERCP (N/A, 9/12/2019); ERCP (N/A, 10/17/2019); and ERCP (N/A, 10/17/2019). Social History:   reports that he quit smoking about 25 years ago. He has quit using smokeless tobacco. He reports current alcohol use. He reports that he does not use drugs. Family History:   Family History   Problem Relation Age of Onset    High Blood Pressure Father     Stroke Father     Diabetes Other        HomeMedications:  Prior to Admission medications    Medication Sig Start Date End Date Taking? Authorizing Provider   carvedilol (COREG) 6.25 MG tablet Take 0.5 tablets by mouth daily 1/8/20  Yes Don Hi MD   ENTRESTO 24-26 MG per tablet TAKE 1 TABLET BY MOUTH TWICE A DAY 12/5/19  Yes Rickie Da Silva MD   atorvastatin (LIPITOR) 20 MG tablet TAKE 1 TABLET EVERY DAY 7/24/19  Yes Eugene Cast DO   vitamin B-12 (CYANOCOBALAMIN) 1000 MCG tablet Take 1,000 mcg by mouth daily   Yes Historical Provider, MD   Cholecalciferol (VITAMIN D) 2000 UNITS CAPS capsule Take 1 capsule by mouth daily. Yes Historical Provider, MD   aspirin 81 MG EC tablet Take 81 mg by mouth daily. Yes Historical Provider, MD   furosemide (LASIX) 40 MG tablet Take 1 tablet by mouth 2 times daily for 3 days 1/8/20 1/11/20  Don Hi MD        Allergies:  Patient has no known allergies. LABS: Results reviewed with patient today.     CBC:   Lab Results   Component Value Date    WBC 5.2 01/13/2020    WBC 8.3 09/13/2019    WBC 5.9 09/11/2019    RBC 4.78 01/13/2020    RBC 4.39 09/13/2019    RBC 4.21 09/11/2019    HGB 14.6 01/13/2020    HGB 13.1 09/13/2019    HGB 12.8 09/11/2019    HCT 42.8 01/13/2020    HCT 39.1 09/13/2019    HCT 37.6 09/11/2019    MCV 89.5 01/13/2020    MCV 89.2 09/13/2019    MCV 89.5 09/11/2019    RDW 13.2 01/13/2020    RDW 13.8 09/13/2019    RDW 14.2 09/11/2019     01/13/2020     09/13/2019     09/11/2019     BMP:  Lab Results   Component Value Date     01/13/2020     09/13/2019     09/11/2019    K 3.7 01/13/2020    K 4.2 09/13/2019    K 3.6 09/11/2019    K 3.1 09/06/2019    K 3.7 09/05/2019    K 4.2 09/02/2019    CL 98 01/13/2020     09/13/2019     09/11/2019    CO2 25 01/13/2020    CO2 22 09/13/2019    CO2 25 09/11/2019    PHOS 2.8 07/10/2012    BUN 16 01/13/2020    BUN 18 09/13/2019    BUN 22 09/11/2019    CREATININE 1.1 01/13/2020    CREATININE 0.9 09/13/2019    CREATININE 1.1 09/11/2019     BNP:   Lab Results   Component Value Date    PROBNP 356 01/13/2020    PROBNP 1,446 09/05/2019    PROBNP 457 12/11/2014     Iron Studies:  No results found for: TIBC, FERRITIN  GLUCOSE:   Recent Labs     01/13/20  1247   GLUCOSE 114*     LIVER PROFILE:   Lab Results   Component Value Date    AST 24 09/13/2019    ALT 24 09/13/2019    LIPASE 25.0 09/02/2019    LABALBU 3.5 09/13/2019    BILIDIR 0.3 09/13/2019    BILITOT 0.6 09/13/2019    ALKPHOS 104 09/13/2019     PT/INR:   Lab Results   Component Value Date    PROTIME 12.3 12/08/2016    INR 1.08 12/08/2016     Cardiac Enzymes:  Lab Results   Component Value Date    CKTOTAL 51 01/04/2017    TROPONINI <0.01 09/02/2019     FASTING LIPID PANEL:  Lab Results   Component Value Date    CHOL 153 07/24/2019    HDL 28 07/24/2019    HDL 28 07/02/2010    LDLCALC 103 07/24/2019    TRIG 110 07/24/2019       Cardiac Imaging: Reports reviewed with patient today. ECHO 9/6/2019  Summary   Suboptimal image quality. Overall left ventricular systolic function appears moderate to severely   reduced. Ejection fraction is visually estimated to be 30-35%. Normal left   ventricular wall thickness and cavity size. There are regional wall motion   abnormalities with akinesis of the basal to apical inferior walls and basal   to mid inferolateral walls.  Abnormal (paradoxical) septal motion is present   likely due to right ventricular pacing. Normal right ventricular size and function. Trivial mitral and tricuspid regurgitation. Echo: 6/30/17: Moderately dilated LV with EF 20-25%; Distal septal wall thinning with akinesis. Postero lateral and inferior akinesis as well. Mild mitral regurgitation is present. Mildly dilated left atrium. Trace aortic regurgitation is present. The RV is mildly enlarged with mildly reduced EF. Pacer / ICD wire is visualized in it. Stress Test:  12/30/2014  Large inferior defect consistent with scar   No reversible ischemia   Dilated LV with severely reduced LV function   Non-diagnostic EKG response due to baseline abnormalities . Cath: 1/12/2015  Severe LV systolic dysfunction with LVEF 30%. Basal to mid inferior wall akinesis. Short occlusion of large dominant Circumflex proximally with collaterals from right. Successful PCI using 2.75 X 18 and 2.75 X 38mm Xience Alpine CORBY resulting in STEFFI 3 flow and 0% residual stenosis. PCI of 75% large OM1 branch with 2.75mm X 8mm Xience Alpine CORBY dilated to 3mm. Assessment/Plan:      1.) Chronic systolic heart failure: Euvolemic today per optivol. Responded well to lasix. Will add small daily dose. NYHA Class III  Diuretic: start lasix 20mg daily with K supplement  Beta Blocker: coreg  ACEi/ARB/ARNI: entresto  Aldosterone Antagonist: none  Cardiac Rehab: After 3 months stable w/ GDMT  2gm Na diet, daily weight, 64 oz fluid restriction  Avoid NSAIDS  ICD: Yes  [address] Referral:      2. ) NSVT: per ICD interrogation.    Management per Dr. Becca Abel  No further shock per patient report    3.) CAD S/P successful PCI/ CORBY:  DAPT: aspirin  Beta Blocker: coreg  ACEi/ARB/ARNI: entresto  Anti anginal: none  Lipid management: lipitor  Risk factor management: high blood pressure, high cholesterol, Diabetes, smoking, obesity, family hx  Lifestyle modification: Heart healthy diet, regular exercise, weight loss, smoking cessation, stress reduction    4.) Hypertension:   Goal BP <130/80. Met. Non pharmacologic interventions include:   -weight loss  -heart healthy low sodium and low fat diet that consist of mostly fruits, vegetables and grains (Dash diet)  -limited amount of alcohol (no more than 1 drink/day for women, 2 drinks/day for men)  -regular physical activity  -no smoking  -stress reduction    Instructions:   1. Medications: Add lasix 20mg daily with potassium supplement  2. Labs: None  3. Follow up: 1 months (Dr. Tuan Stark). 4. Daily weight: Call for increase 3 lbs/day or 5 lbs/week. 5. 2 gm sodium diet:  6. Fluid Restriction: 64 oz.   7.Referred to [employer] for Education:  Yes, completed    I appreciate the opportunity of cooperating in the care of this individual.    LAURA Garcia CNP, 1/14/2020,2:17 PM

## 2020-01-27 PROBLEM — R73.9 HYPERGLYCEMIA: Status: ACTIVE | Noted: 2020-01-01

## 2020-02-24 NOTE — PROGRESS NOTES
No reversible ischemia   Dilated LV with severely reduced LV function   Non-diagnostic EKG response due to baseline abnormalities . Echo 8/29/16  Overall left ventricular systolic function is severely reduced. Ejection   fraction is visually estimated to be 25-30 %. There is severe diffuse   hypokinesis with regional variation including akinesis of the inferior wall.   Mild-to-moderately dilated left ventricle. Diastolic filling parameters   suggests grade II diastolic dysfunction.   Normal right ventricular size and function.   The left atrium appears mildly dilated.   Trivial mitral regurgitation. Echo 6/30/17  Moderately dilated LV with EF 20-25%; Distal septal wall thinning with  akinesis. Postero lateral and inferior akinesis as well. Mild mitral regurgitation is present. Mildly dilated left atrium. Trace aortic regurgitation is present. The RV is mildly enlarged with mildly reduced EF. Pacer / ICD wire is  visualized in it.     Echo 8/28/18  Global left ventricular function is moderate-to-severely decreased with  ejection fraction estimated to be 25 %. The left atrium is severely dilated. Normal right ventricular size and function. Pacer / ICD wire is visualized in the right ventricle. Stress perfusion 8/28/19  Abnormal myocardial perfusion defect    Fixed inferior wall defect    no reversible ischemia    Decreased LV Systolic function    Overall findings represent a intermediate-high risk study. Echo 9/6/19  Suboptimal image quality. Overall left ventricular systolic function appears moderate to severely  reduced. Ejection fraction is visually estimated to be 30-35%. Normal left  ventricular wall thickness and cavity size. There are regional wall motion  abnormalities with akinesis of the basal to apical inferior walls and basal  to mid inferolateral walls. Abnormal (paradoxical) septal motion is present  likely due to right ventricular pacing.   Normal right ventricular size and function. Trivial mitral and tricuspid regurgitation.       Assessment:  1. CAD of native coronary arteries without angina, s/p PCI  2. Chronic Systolic CHF, class 3, s/p BiV AICD  3. Hyperlipidemia with goal LDL<70mg/dL  4. Left Bundle Branch Block  5. Short term memory loss  6. Sleep apnea, untreated    Plan:  He will need to complete a stress perfusion study prior to surgery as he is not participating in any regular exertion as a means to gauge his exercise capacity. He is currently well compensated from a heart failure standpoint. I discussed the likelihood of his untreated sleep apnea greatly contributing to his fatigue. I will also have him hold carvedilol given his excessive daytime fatigue. It is very difficult for me to believe that he does not have some element of short term memory loss as his compliance is poor along with other symptoms of dementia. His wife reports that the neurologist told him he was fine. I will see him back in office for follow up in 6 months.

## 2020-02-25 NOTE — PROGRESS NOTES
[facility]   Daily Progress Note  CC: \"I'm fine but have a cough. \"     Mr. Mary Cast is 62 y.o. male with a past medical history significant for prior TI/CVA, Multiple Sclerosis, hypertension, CAD s/p PCI and chronic systolic CHF. I performed a left heart catheterization for him on 1/12/15 demonstrating severe LV systolic dysfunction and an occluded Circumflex. The Circumflex was stented with two CORBY resulting in STEFFI 3 flow. A 75% large OM1 lesion was also intervened upon with a CORBY. He has a residual 70% lesion in a large first diagonal branch of the LAD (bifurcating LAD really). He was started on medical therapy for his LVEF of 30%. I referred him to Dr. Delfina Barksdale who put in an MRI compatible Medtronic BiVentricular pacemaker/AICD on 12/16/16. His LV function did not significantly improve when I repeated his echo, so I started him on Entresto therapy. He was admitted 8/10/19-8/14/19 with acute cholecystitis. He was to undergo cholecystectomy but troponin increased significantly (0.14) and surgery was cancelled. He returns to the office today in follow-up accompanied by his wife. Today, he reports feeing \"good. \" He denies any new cardiac complaints. He denies any chest pains or worsening shortness of breath. He denies any worsening memory issues. He is accompanied by his wife. She reports PND and coughing over the past few months. He denies any weight gain, shortness of breath or worsening LE edema. He denies any regular exercise and but states he likes to play golf. His wife states he has been very sedentary throughout the day. He was previously diagnosed with sleep apnea but did not wish to pursue CPAP therapy. Patient denies exertional chest pain/pressure, dyspnea at rest, PARRY, palpitations, lightheadedness, weight changes, changes in LE edema, and syncope.     Current Outpatient Medications   Medication Sig Dispense Refill    carvedilol (COREG) 6.25 MG tablet Take 6.25 mg by mouth 2 times daily (with meals)      HYDROcodone-acetaminophen (NORCO) 5-325 MG per tablet Take 1 tablet by mouth every 6 hours as needed for Pain for up to 30 days. 120 tablet 0    furosemide (LASIX) 20 MG tablet Take 1 tablet by mouth daily 90 tablet 3    potassium chloride (KLOR-CON M) 20 MEQ extended release tablet Take 0.5 tablets by mouth daily 90 tablet 3    ENTRESTO 24-26 MG per tablet TAKE 1 TABLET BY MOUTH TWICE A  tablet 3    atorvastatin (LIPITOR) 20 MG tablet TAKE 1 TABLET EVERY DAY 90 tablet 1    vitamin B-12 (CYANOCOBALAMIN) 1000 MCG tablet Take 1,000 mcg by mouth daily      Cholecalciferol (VITAMIN D) 2000 UNITS CAPS capsule Take 1 capsule by mouth daily.  aspirin 81 MG EC tablet Take 81 mg by mouth daily. No current facility-administered medications for this visit. Objective:   BP (!) 144/88   Pulse 61   Ht 5' 6\" (1.676 m)   Wt 213 lb (96.6 kg)   SpO2 98%   BMI 34.38 kg/m²      Physical Exam:  General:  Awake, alert, NAD  Skin:  Warm and dry  Neck:  JVD<8, no carotid bruits  Chest:  Clear to auscultation, no wheezes/rhonchi/rales  Cardiovascular:  RRR, normal S1/S2, no M/R/G  Abdomen:  Soft, nontender, +bowel sounds  Extremities:  No edema  Pulses: 2+ bilat carotid    2+ bilat radial, no hematoma at right radial cath site. 2+ bilat femoral      Lab Results   Component Value Date    CHOL 101 01/27/2020    HDL 29 01/27/2020    HDL 28 07/02/2010    TRIG 99 01/27/2020     ECG 06/2015: Sinus bradycardia with LBBB  ECG 8/24/16: Sinus bradycardia with frequent PVC's, LBBB  ECG 2/25/20: Paced     Procedures:     [facility] 1/12/15  Findings:   Severe LV systolic dysfunction with LVEF 30%. Basal to mid inferior wall akinesis. Short occlusion of large dominant Circumflex proximally with collaterals from right. Successful PCI using 2.75 X 18 and 2.75 X 38mm Xience Alpine CORBY resulting in STEFFI 3 flow and 0% residual stenosis.    PCI of 75% large OM1 branch with 2.75mm X 8mm Xience Alpine CORBY dilated to 3mm. Imaging:     Echo 12/19/14  Normal LV size and systolic function: EF is   55%. Grade I diastolic  dysfunction. Trivial mitral, aortic and tricuspid regurgitation is present. Stress perfusion 12/30/14  Large inferior defect consistent with scar   No reversible ischemia   Dilated LV with severely reduced LV function   Non-diagnostic EKG response due to baseline abnormalities . Echo 8/29/16  Overall left ventricular systolic function is severely reduced. Ejection   fraction is visually estimated to be 25-30 %. There is severe diffuse   hypokinesis with regional variation including akinesis of the inferior wall.   Mild-to-moderately dilated left ventricle. Diastolic filling parameters   suggests grade II diastolic dysfunction.   Normal right ventricular size and function.   The left atrium appears mildly dilated.   Trivial mitral regurgitation. Echo 6/30/17  Moderately dilated LV with EF 20-25%; Distal septal wall thinning with  akinesis. Postero lateral and inferior akinesis as well. Mild mitral regurgitation is present. Mildly dilated left atrium. Trace aortic regurgitation is present. The RV is mildly enlarged with mildly reduced EF. Pacer / ICD wire is  visualized in it.     Echo 8/28/18  Global left ventricular function is moderate-to-severely decreased with  ejection fraction estimated to be 25 %. The left atrium is severely dilated. Normal right ventricular size and function. Pacer / ICD wire is visualized in the right ventricle. Stress perfusion 8/28/19  Abnormal myocardial perfusion defect    Fixed inferior wall defect    no reversible ischemia    Decreased LV Systolic function    Overall findings represent a intermediate-high risk study. Echo 9/6/19  Suboptimal image quality. Overall left ventricular systolic function appears moderate to severely  reduced. Ejection fraction is visually estimated to be 30-35%. Normal left  ventricular wall thickness and cavity size. There are regional wall motion  abnormalities with akinesis of the basal to apical inferior walls and basal  to mid inferolateral walls. Abnormal (paradoxical) septal motion is present  likely due to right ventricular pacing. Normal right ventricular size and function. Trivial mitral and tricuspid regurgitation.     Assessment:  1. CAD of native coronary arteries without angina, s/p PCI  2. Chronic Systolic CHF, class 3, s/p BiV AICD  3. Hyperlipidemia with goal LDL<70mg/dL  4. Left Bundle Branch Block  5. Short term memory loss  6. Sleep apnea, untreated  7. BiV ICD    Plan:  Claudio Nino appears compensated today but reports worsening PND with a cough. I will have him increase his Entresto to 49-51 mg bid and continue the Lasix 20 mg daily. He is not participating in any regular exercise so I have asked him to increase his aerobic exercise as tolerated. I again encouraged him to follow up with pulmonary for the untreated sleep apnea but he declines. His wife denies any changes with his short term memory. His blood pressure is well controlled. He will continue to follow up with EP for device management. I will have him follow up with Jeovanny Ibanez NP in 3-4 weeks       This note was scribed in the presence of Dr Mable Marquez MD by Daisy Clay RN. Physician Attestation:  The scribes documentation has been prepared under my direction and personally reviewed by me in its entirety. I, Dr. Mable Marquez personally performed the services described in this documentation as scribed by my RN,  Daisy Clay in my presence, and I confirm that the note above accurately reflects all work, treatment, procedures, and medical decision making performed by me.

## 2020-02-25 NOTE — PATIENT INSTRUCTIONS
other fast foods. ? Pickles, olives, ketchup, and other condiments, especially soy sauce, unless labeled sodium-free or low-sodium. Where can you learn more? Go to [email or web address] and sign in to your [employer] account. Enter W114 in the [facility] box to learn more about \"Low Sodium Diet (2,000 Milligram): Care Instructions. \"     If you do not have an account, please click on the \"Sign Up Now\" link. Current as of: August 21, 2019  Content Version: 12.3  © 8831-5099 Healthwise, Incorporated. Care instructions adapted under license by [facility] ([facility]). If you have questions about a medical condition or this instruction, always ask your healthcare professional. [address] any warranty or liability for your use of this information.

## 2020-02-25 NOTE — PROGRESS NOTES
[facility]   Daily Progress Note      Mr. Sera Sheikh is 62 y.o. male with a past medical history significant for prior TI/CVA, Multiple Sclerosis, hypertension, CAD s/p PCI and chronic systolic CHF. I performed a left heart catheterization for him on 1/12/15 demonstrating severe LV systolic dysfunction and an occluded Circumflex. The Circumflex was stented with two CORBY resulting in STEFFI 3 flow. A 75% large OM1 lesion was also intervened upon with a CORBY. He has a residual 70% lesion in a large first diagonal branch of the LAD (bifurcating LAD really). He was started on medical therapy for his LVEF of 30%. I referred him to Dr. Tamera Brizuela who put in an MRI compatible Medtronic BiVentricular pacemaker/AICD on 12/16/16. His LV function did not significantly improve when I repeated his echo, so I started him on Entresto therapy. He was admitted 8/10/19-8/14/19 with acute cholecystitis. He was to undergo cholecystectomy but troponin increased significantly (0.14) and surgery was cancelled. He returns to the office today in follow-up accompanied by his wife. Today, he reports feeing \"okay\". He has been seen in office by neurology and not given a definitive answer regarding his memory issues per his wife's account. He denies any regular exercise and does not have to climb stairs at all during the day. His wife states he has been very sedentary and since resuming all medications he sleeps often during the day. He was previously diagnosed with sleep apnea but did not wish to pursue CPAP therapy. He denies any chest pain or awareness of his heart racing. He denies shortness of breath. Current Outpatient Medications   Medication Sig Dispense Refill    carvedilol (COREG) 6.25 MG tablet Take 6.25 mg by mouth 2 times daily (with meals)      HYDROcodone-acetaminophen (NORCO) 5-325 MG per tablet Take 1 tablet by mouth every 6 hours as needed for Pain for up to 30 days.  120 tablet 0    furosemide (LASIX) 20 MG tablet Take 1 tablet by mouth daily 90 tablet 3    potassium chloride (KLOR-CON M) 20 MEQ extended release tablet Take 0.5 tablets by mouth daily 90 tablet 3    ENTRESTO 24-26 MG per tablet TAKE 1 TABLET BY MOUTH TWICE A  tablet 3    atorvastatin (LIPITOR) 20 MG tablet TAKE 1 TABLET EVERY DAY 90 tablet 1    vitamin B-12 (CYANOCOBALAMIN) 1000 MCG tablet Take 1,000 mcg by mouth daily      Cholecalciferol (VITAMIN D) 2000 UNITS CAPS capsule Take 1 capsule by mouth daily.  aspirin 81 MG EC tablet Take 81 mg by mouth daily. No current facility-administered medications for this visit. Objective: There were no vitals taken for this visit. Physical Exam:  General:  Awake, alert, NAD  Skin:  Warm and dry  Neck:  JVD<8, no carotid bruits  Chest:  Clear to auscultation, no wheezes/rhonchi/rales  Cardiovascular:  RRR, normal S1/S2, no M/R/G  Abdomen:  Soft, nontender, +bowel sounds  Extremities:  No edema  Pulses: 2+ bilat carotid    2+ bilat radial, no hematoma at right radial cath site. 2+ bilat femoral      Lab Results   Component Value Date    CHOL 101 01/27/2020    HDL 29 01/27/2020    HDL 28 07/02/2010    TRIG 99 01/27/2020     ECG 06/2015: Sinus bradycardia with LBBB  ECG 8/24/16: Sinus bradycardia with frequent PVC's, LBBB      Procedures:     [facility] 1/12/15  Findings:   Severe LV systolic dysfunction with LVEF 30%. Basal to mid inferior wall akinesis. Short occlusion of large dominant Circumflex proximally with collaterals from right. Successful PCI using 2.75 X 18 and 2.75 X 38mm Xience Alpine CORBY resulting in STEFFI 3 flow and 0% residual stenosis. PCI of 75% large OM1 branch with 2.75mm X 8mm Xience Alpine CORBY dilated to 3mm. Imaging:     Echo 12/19/14  Normal LV size and systolic function: EF is   55%. Grade I diastolic  dysfunction. Trivial mitral, aortic and tricuspid regurgitation is present.     Stress perfusion 12/30/14  Large inferior defect consistent with scar No reversible ischemia   Dilated LV with severely reduced LV function   Non-diagnostic EKG response due to baseline abnormalities . Echo 8/29/16  Overall left ventricular systolic function is severely reduced. Ejection   fraction is visually estimated to be 25-30 %. There is severe diffuse   hypokinesis with regional variation including akinesis of the inferior wall.   Mild-to-moderately dilated left ventricle. Diastolic filling parameters   suggests grade II diastolic dysfunction.   Normal right ventricular size and function.   The left atrium appears mildly dilated.   Trivial mitral regurgitation. Echo 6/30/17  Moderately dilated LV with EF 20-25%; Distal septal wall thinning with  akinesis. Postero lateral and inferior akinesis as well. Mild mitral regurgitation is present. Mildly dilated left atrium. Trace aortic regurgitation is present. The RV is mildly enlarged with mildly reduced EF. Pacer / ICD wire is  visualized in it.     Echo 8/28/18  Global left ventricular function is moderate-to-severely decreased with  ejection fraction estimated to be 25 %. The left atrium is severely dilated. Normal right ventricular size and function. Pacer / ICD wire is visualized in the right ventricle. Stress perfusion 8/28/19  Abnormal myocardial perfusion defect    Fixed inferior wall defect    no reversible ischemia    Decreased LV Systolic function    Overall findings represent a intermediate-high risk study. Echo 9/6/19  Suboptimal image quality. Overall left ventricular systolic function appears moderate to severely  reduced. Ejection fraction is visually estimated to be 30-35%. Normal left  ventricular wall thickness and cavity size. There are regional wall motion  abnormalities with akinesis of the basal to apical inferior walls and basal  to mid inferolateral walls. Abnormal (paradoxical) septal motion is present  likely due to right ventricular pacing.   Normal right ventricular size and function. Trivial mitral and tricuspid regurgitation.       Assessment:  1. CAD of native coronary arteries without angina, s/p PCI  2. Chronic Systolic CHF, class 3, s/p BiV AICD  3. Hyperlipidemia with goal LDL<70mg/dL  4. Left Bundle Branch Block  5. Short term memory loss  6. Sleep apnea, untreated  7. BiV ICD    Plan:  He will need to complete a stress perfusion study prior to surgery as he is not participating in any regular exertion as a means to gauge his exercise capacity. He is currently well compensated from a heart failure standpoint. I discussed the likelihood of his untreated sleep apnea greatly contributing to his fatigue. I will also have him hold carvedilol given his excessive daytime fatigue. It is very difficult for me to believe that he does not have some element of short term memory loss as his compliance is poor along with other symptoms of dementia. His wife reports that the neurologist told him he was fine. I will see him back in office for follow up in 6 months.

## 2020-04-09 NOTE — PROGRESS NOTES
[address] VIRTUAL/VIDEO VISIT    4/9/2020    TELEHEALTH EVALUATION -- Audio/Visual (During [facility]-12 public health emergency)    HPI: Cosmo Valverde has requested an audio/video evaluation. He is a  75 y.o. male with a past medical history significant for TIA/CVA, multiple sclerosis, HTN, CAD/PCI circ with CORBY x 2, chronic systolic HF and S/P MRI compatible Medtronic BIV pacer and ICD in 12/2016 here for follow up. He was last seen by Dr. Chuy Oviedo on 2/25/2020 and his Lenette [place] was increased and diuretic continued. Here for follow up of medication changes. Overall feeling well since change in medication. Tolerating increase in Lenette [place]. Since last seen he has stopped the lasix. Using only as needed at this point if edema or some SOB noted. Cough much improved. Monitoring sodium and fluids closely. He is trying to increase ambulation around home. Denies chest pain/discomfort, SOB, orthopnea/PND, palpitations, dizziness, syncope, weight change (?) or claudication. Edema improved. Wife states he is doing much better since last seen. Has not had labs since increase in Lenette Candido. Monitoring sodium and fluid intake    Review of System:    · Constitutional: No fevers, chills. · Eyes: No visual changes or diplopia. No scleral icterus. · ENT: No Headaches. No mouth sores or sore throat. · Cardiovascular: No for chest pain, No for dyspnea on exertion, No for palpitations or No for loss of consciousness. No cough, hemoptysis, No for pleuritic pain, or phlebitis. · Respiratory: Yes for cough (much improved). No hematemesis. · Gastrointestinal: No abdominal pain, blood in stools. · Genitourinary: No dysuria, or hematuria. · Musculoskeletal: No gait disturbance,    · Integumentary: No rash or pruritis. · Neurological: No headache, change in muscle strength, numbness or tingling. · Psychiatric: No anxiety, or depression. · Endocrine: No temperature intolerance.  No excessive thirst, fluid  Essential hypertension     Hemorrhagic stroke ([facility] [address]     Hypercholesterolemia     Hyperglycemia 01/27/2020    Ischemic cardiomyopathy     Dr. Jigar Luna / Implanted ICD    Lumbar disc disease with radiculopathy     laminectomy dr Leon Dance  1/2014    Major depression single episode, in partial remission ([facility] [address]     MS (multiple sclerosis) ([facility] [address]     Dr. Darlene Aragon Non morbid obesity due to excess calories     Rotator cuff tear     Lt    TIA (transient ischemic attack)      Past Surgical History:   Procedure Laterality Date    CARDIAC DEFIBRILLATOR PLACEMENT  12/2016    CHOLECYSTECTOMY, LAPAROSCOPIC N/A 9/3/2019    LAPAROSCOPIC CHOLECYSTECTOMY performed by Sherman Hughes MD at [address]  1-14-15    Drug Eluting Stents x 3    ERCP N/A 9/12/2019    ERCP STENT INSERTION performed by Unruly Mosqueda MD at [address] ERCP N/A 9/12/2019    ERCP SPHINCTER/PAPILLOTOMY performed by Unruly Mosqueda MD at [address] ERCP N/A 10/17/2019    ERCP STENT REMOVAL performed by Unruly Mosqueda MD at [address] ERCP N/A 10/17/2019    ERCP BALLOON SWEEP performed by Unruly Mosqueda MD at [address] 2014    dr Donis Najjar      from arm     PHYSICAL EXAMINATION:  [ INSTRUCTIONS:  \"[x]\" Indicates a positive item  \"[]\" Indicates a negative item  -- DELETE ALL ITEMS NOT EXAMINED]  Vital Signs: (As obtained by patient/caregiver or practitioner observation)    Constitutional: [x] Appears well-developed and well-nourished [x] No apparent distress        Mental status  [x] Alert and awake  [x] Oriented to person/place/time     Eyes:  EOM    [x]  Normal  [] Abnormal-    HENT:   [x] Normocephalic, atraumatic.      Pulmonary/Chest: [x] Respiratory effort normal.  [x] No visualized signs of difficulty breathing or respiratory distress           Psychiatric:       [x] caregiver was present when appropriate. Due to this being a TeleHealth encounter (During [facility]-16 public health emergency), evaluation of the following organ systems was limited: Vitals/Constitutional/EENT/Resp/CV/GI//MS/Neuro/Skin/Heme-Lymph-Imm. Pursuant to the emergency declaration under the [address] [place] [address], [address] [place] [address] and the [employer] Act, this Virtual Visit was conducted with patient's (and/or legal guardian's) consent, to reduce the patient's risk of exposure to COVID-19 and provide necessary medical care. The patient (and/or legal guardian) has also been advised to contact this office for worsening conditions or problems, and seek emergency medical treatment and/or call 911 if deemed necessary. Services were provided through a video synchronous discussion virtually to substitute for in-person clinic visit. Patient and provider were located at their individual homes. --VIANEY Palacios CNP on 4/9/2020 at 3:07 PM    An electronic signature was used to authenticate this note.

## 2020-04-14 NOTE — PROGRESS NOTES
Carelink transmission shows normal sensing and pacing function. On April 1st, pt received ATP therapy 2x for VT. EP to review. See interrogation for more details. Optivol is within normal range.

## 2020-05-12 PROBLEM — I47.20 VENTRICULAR TACHYCARDIA: Status: ACTIVE | Noted: 2020-01-01

## 2020-05-13 NOTE — PROGRESS NOTES
Spoke with patient's wife on phone. Updated on current treatment and events since her 's admission. She asked appropriate questions and stated that all her questions were answered. Provided her with phone number to call directly to [facility].

## 2020-05-13 NOTE — PRE SEDATION
[facility] [address] [facility] [place], [place] 16.7 mL/hr at 05/13/20 6765 0.5 mg/min at 05/13/20 9272    sodium bicarbonate tablet 1,300 mg  1,300 mg Oral 4x Daily Fatemeh Ward MD   1,300 mg at 05/13/20 3354    aspirin EC tablet 81 mg  81 mg Oral Daily Fatemeh Ward MD   81 mg at 05/13/20 4268    atorvastatin (LIPITOR) tablet 20 mg  20 mg Oral Daily Fatemeh Ward MD        carvedilol (COREG) tablet 3.125 mg  3.125 mg Oral BID Fatemeh Ward MD   Stopped at 05/13/20 0843    Vitamin D (CHOLECALCIFEROL) tablet 2,000 Units  2,000 Units Oral Daily Fatemeh Ward MD        furosemide (LASIX) tablet 20 mg  20 mg Oral Daily Fatemeh Ward MD   20 mg at 05/13/20 0917    HYDROcodone-acetaminophen (NORCO) 5-325 MG per tablet 1 tablet  1 tablet Oral Q6H PRN Fatemeh Ward MD        potassium chloride (KLOR-CON M) extended release tablet 20 mEq  20 mEq Oral Daily with breakfast Fatemeh Ward MD        sacubitril-valsartan (ENTRESTO) 49-51 MG per tablet 1 tablet  1 tablet Oral BID Fatemeh Ward MD   1 tablet at 05/13/20 3809    vitamin B-12 (CYANOCOBALAMIN) tablet 1,000 mcg  1,000 mcg Oral Daily Fatemeh Ward MD   1,000 mcg at 05/13/20 3282    sodium chloride flush 0.9 % injection 10 mL  10 mL Intravenous 2 times per day Fatemeh Ward MD   10 mL at 05/13/20 0916    sodium chloride flush 0.9 % injection 10 mL  10 mL Intravenous PRN Fatemeh Ward MD   10 mL at 05/13/20 0817    acetaminophen (TYLENOL) tablet 650 mg  650 mg Oral Q6H PRN Fatemeh Ward MD        Or   [place]zar acetaminophen (TYLENOL) suppository 650 mg  650 mg Rectal Q6H PRN Fatemeh Ward MD        polyethylene glycol (GLYCOLAX) packet 17 g  17 g Oral Daily PRN Fatemeh Ward MD        enoxaparin (LOVENOX) injection 40 mg  40 mg Subcutaneous Daily Fatemeh Ward MD        insulin lispro (1 Unit Dial) 0-6 Units  0-6 Units Subcutaneous TID  Susanne Garcia MD        insulin lispro (1 Unit Dial) 0-3 Units

## 2020-05-13 NOTE — CONSULTS
Consulted for admission for vtach s/p multiple shocks. Cardio had not been consulted. Rec d/w cardio, then re-consult.

## 2020-05-13 NOTE — ED PROVIDER NOTES
I personally evaluated and examined the patient in conjunction with the JULIA and agree with the assessment, treatment plan and disposition of the patient as recorded by the JULIA. I reviewed pertinent nursing notes, triage notes, vital signs, past medical history, family and social history, medications, and allergies. Complete review of systems was conducted by the JULIA and/or myself. Review of systems is negative except as documented in the history of present illness. Brief HPI: This is a 78-year-old male presents the emergency department chief complaint of defibrillator going off. Brought in by [facility]. He was cutting the grass when this started. He was given a total of 450 mg of amiodarone. EMS reports that they think his defibrillator went off 12 times. He admits to some lightheadedness but no chest pain or shortness of breath. Physical Exam: General: Patient is in no acute distress   Head: Normocephalic, atraumatic, pupils are equal and reactive to light. EOMI. Neck: Neck is supple. No JVD noted. Heart: RRR no murmurs, rubs, or gallops   Lungs: CTA BL   Abdomen: soft, non-tender, non-distended   Extremities: no lower extremity edema. Capillary refill is less than 2 seconds   Skin: no cyanosis or pallor; no rashes noted   Neuro: CN's 2-12 are grossly intact. No focal neurologic deficit appreciated. Initially on the monitor patient appeared to be in V. tach. However he self converted. Magnesium and amiodarone drip were initiated. His rhythm appears to have stable on repeat evaluation. Blood pressure is stable. IV potassium to be repleted. Patient will be admitted for further management. Did order a pacer interrogation. EKG: EKG interpretation by ED physician what appears to be electronic ventricular paced rhythm with intermittent PVCs at 114 bpm.  No obvious ischemic changes.       CRITICAL CARE TIME: 35 minutes excluding billable procedure time: Treatment of patient with V. tach, complex medical decision making, multiple re-evaluations, potential for deterioration. FINAL IMPRESSION     1. Ventricular tachycardia ([facility] [address]    2. Defibrillator discharge            Electronically signed by:   Efrain Beebe DO  05/12/20 1920

## 2020-05-13 NOTE — PROGRESS NOTES
Pt troponin came back at 0.932 Dr Makenzie Carranza made aware stated attari will be seeing pt today

## 2020-05-13 NOTE — PROGRESS NOTES
Pt arrived to unit admitted to room 2918  Pt alert and oriented   Amiodarone running at 33.3 ml/h  Pt transported with [place] on put pads not on pt

## 2020-05-13 NOTE — CONSULTS
Facility-Administered Medications   Medication Dose Route Frequency Provider Last Rate Last Dose    heparin (porcine) injection 4,000 Units  4,000 Units Intravenous PRN VIANEY Ontiveros CNP        heparin (porcine) injection 2,000 Units  2,000 Units Intravenous PRN VIANEY Ontiveros CNP        heparin 25,000 units in dextrose 5% 250 mL infusion  10.53 Units/kg/hr Intravenous Continuous VIANEY Ontiveros CNP 10 mL/hr at 05/13/20 1000 10.54 Units/kg/hr at 05/13/20 1000    amiodarone (CORDARONE) tablet 200 mg  200 mg Oral BID Yohannes Leyva MD        amiodarone (CORDARONE) 450 mg in sodium chloride 0.9 % 250 mL infusion  0.5 mg/min Intravenous Continuous Ana Brito PA-C 16.7 mL/hr at 05/13/20 0620 0.5 mg/min at 05/13/20 1372    sodium bicarbonate tablet 1,300 mg  1,300 mg Oral 4x Daily Eri Steel MD   1,300 mg at 05/13/20 9508    aspirin EC tablet 81 mg  81 mg Oral Daily Eri Steel MD   81 mg at 05/13/20 9329    atorvastatin (LIPITOR) tablet 20 mg  20 mg Oral Daily Eri Steel MD        carvedilol (COREG) tablet 3.125 mg  3.125 mg Oral BID Eri Steel MD   Stopped at 05/13/20 0843    Vitamin D (CHOLECALCIFEROL) tablet 2,000 Units  2,000 Units Oral Daily Eri Steel MD        furosemide (LASIX) tablet 20 mg  20 mg Oral Daily Eri Steel MD   20 mg at 05/13/20 0917    HYDROcodone-acetaminophen (NORCO) 5-325 MG per tablet 1 tablet  1 tablet Oral Q6H PRN Eri Steel MD        potassium chloride (KLOR-CON M) extended release tablet 20 mEq  20 mEq Oral Daily with breakfast Eri Steel MD        sacubitril-valsartan (ENTRESTO) 49-51 MG per tablet 1 tablet  1 tablet Oral BID Eri Steel MD   1 tablet at 05/13/20 0916    vitamin B-12 (CYANOCOBALAMIN) tablet 1,000 mcg  1,000 mcg Oral Daily Eri Steel MD   1,000 mcg at 05/13/20 0917    sodium chloride flush 0.9 % injection 10 mL  10 mL Intravenous 2 times per day years, rare flare-ups  EYES:  Negative, no contacts or glasses  HEENT:  Partial bridge, patient not currently wearing, hearing loss but does not wear hearing aids  RESPIRATORY:  negative  CARDIOVASCULAR:  negative  GASTROINTESTINAL:  negative  GENITO-URINARY: no dysuria, trouble voiding, or hematuria  ENDOCRINE: negative  MUSCULOSKELETAL:  negative  HEMATOLOGICAL AND LYMPHATIC: negative  IMMUNOLOGICAL: negative  PSYCHOLOGICAL: tearful regarding hospital admission and not being able to have his wife present     PHYSICAL EXAM:    VITALS:  BP (!) 104/47   Pulse 51   Temp 97.1 °F (36.2 °C) (Temporal)   Resp 14   Ht 5' 6\" (1.676 m)   Wt 209 lb 3.5 oz (94.9 kg)   SpO2 98%   BMI 33.77 kg/m²      Right BP: TR band  Left /55    Eyes:  lids and lashes normal, pupils equal and round, extra ocular muscles intact, sclera clear, conjunctiva normal    Head/ENT:  Normocephalic, atraumatic, normal dentition (bridge not in), normal gums, & palate, oropharynx without erythema or exudates    Neck:  supple, symmetrical, trachea midline, no lymphadenopathy, no jugular venous distension, no carotid bruits and MASSES:  no masses    Lungs:  no increased work of breathing, good air exchange, no retractions and clear to auscultation, no palpable / percussible abnormalities, on room air 97%    Cardiovascular:  Ventricular paced, no murmur, click, rub or gallop, no S3 or S4     Pulses:  Right dorsalis pedis 2, Left dorsalis pedis 2, Right posterior tibial 1, Left Posterior tibial 1, Right Femoral 2, Left Femoral 2, Right radial 2, and Left radial 2    Abdomen:  Soft, hypoactive bowel sounds, non-tender, no hepatosplenomegaly, aorta likely normal and no bruits present    Musculoskeletal:  Back is straight and non-tender,  No CVAT, full ROM of upper and lower extremities.     Extremities:   No clubbing, or cyanosis, or edema     Skin: warm and normal turgor, no ulcers, infections, or rashes, no rashes, no ecchymoses, no petechiae, no nodules, no jaundice    Neurological: awake, alert and oriented x 3, motor 5/5 bilateral upper and lower extremities, sensation grossly intact    Psychiatric: Patient tearful, mood and affect appear appropriate    DATA:  Echo:   Left ventricle is mildly dilated. Severely depressed systolic function with an estimated ejection fraction of  25%. There is hypokinesis of inferior/inferolateral walls. Grade II diastolic dysfunction with elevated LV filling pressures. Mild mitral regurgitation. Aortic valve appears sclerotic but opens adequately. The right ventricle is normal in size and function. Pacer / ICD wire is visualized in the right ventricle.     EKG:   Atrial sensed and ventricular paced rhythm      CXR:   Pacemaker device is stable.  Minimal vascular congestion without florid pulmonary edema.  Stable heart size and mediastinal contours.      STRESS LAB: 8/28/2019  Abnormal myocardial perfusion defect  Fixed inferior wall defect, no reversible ischemia  Decreased LV Systolic function  Overall findings represent a intermediate-high risk study. EF 22%    ANGIOGRAM:   Anatomy:   LM-distal 50%   LAD-Proximal 60%, mid 80% LAD. Dual LAD system. Cx-Dominant, 100%   Ramus-  large vessel, patent stent  RCA-Small non dominant  LPDA- Collaterals to LPDA  LVEF- 25  LVG- Inferior akinesis, global hypokinesis  LVEDP- 16     Adult Cardiac Risk:   Mortality 6.17  Renal Failure 6.19  Permanent Stroke 2.989  Prolonged Ventilation 26.313  DSW Infection 1.34  Reoperation 2.75  Morbidity or Mortality 32.73  Short Length of Stay 13.83  Long Length of Stay 23.03     CHADS-VASC: 6     ASSESSMENT AND PLAN:    3V CAD with h/o MI and fixed inferior wall defect, CHF, ICM with AICD and BiV pacer, HTN, HLD, h/o MS, hemorrhagic CVA and depression, VT, PAF    D/W Dr. Leal Fore and reviewed LHC and old GXT with him. Patient is high risk for complication with surgery.   Will check viability study to see if anterior wall would benefit

## 2020-05-13 NOTE — H&P
Hospital Medicine History and Physical    5/12/2020    Date of Admission: 5/12/2020    Date of Service: Pt seen/examined on 5/12/2020 and admitted to inpatient. Please note: For prompt resolution of any issues, kindly send any Perfect Serve messages (after 2 AM) to [id number] and daytime attending physician (after 7 AM). I will not be available to address Perfect Serve messages after  2 AM.     Assessment/plan:  1. Ventricular tachycardia. Could be secondary to multiple electrolyte abnormalities. Correct hypokalemia, anion gap metabolic acidosis. Continue bicarb infusion. Continue beta-blocker. Check serial troponin. Obtain echocardiogram.  Check TSH. Cardiology has been consulted from the emergency room. Admit to CVU for close monitoring. AICD interrogation currently underway in the emergency room. 2. Syncope and collapse. Likely secondary to #1. Maintain on fall precautions. ECHO pending. Cardio eval in AM.   3. Hyperglycemia. Glucose 212 in the emergency room. Does have anion gap metabolic acidosis which is likely secondary to volume depletion. Check urine for ketones, check beta hydroxybutyrate acid, venous blood gas. In the meantime, start sliding scale insulin, monitor Accu-Chek closely and adjust insulin dose as needed. Check hemoglobin A1c.  4. Multiple electrolyte abnormalities: Hypokalemia, anion gap metabolic acidosis. 40 mEq of p.o. potassium x2 doses. Patient currently receiving magnesium infusion in the emergency room. Start bicarb tablets. Recheck electrolytes in the morning. 5. Chronic diastolic CHF. Does not appear to be volume overloaded or in CHF exacerbation. Maintain on fluid restrictions. Continue beta-blocker, Entresto. 6. Other comorbidities: History of CAD, obesity with BMI of 34.8 kg/m², essential hypertension, hyperlipidemia, history of CVA, history of MS, status post AICD placement.     Activities: Up with assist  Prophylaxis: Subcutaneous Lovenox  Code status: Full code    ==========================================================  Chief complaint:  Chief Complaint   Patient presents with    Irregular Heart Beat     Pt brought in by [facility] from home. Patient was cutting grass when defibrillator fired. Per squad patient received total of 450 amiodorone, and shocked 3 times. History of Presenting Illness: This is a pleasant 75 y.o. male with history of chronic systolic heart failure (most recent echocardiogram from September 2019 with ejection fraction 30 to 35%), history of CAD, essential hypertension, hyperlipidemia, history of CVA, obesity with BMI of 34.8 kg/m², status post AICD placement, who presents to the emergency room with complaints of palpitations and defibrillator discharge. Patient was mowing grass at home when he first experienced some palpitations which was followed by defibrillator discharge. He did pass out as he does not recollect most of what happened after EMS were called. En route, patient was started on amiodarone. He also required 3 short deliveries following repeat episodes of what appears to be ventricular tachycardia. Patient did not experience syncope. On presentation to the emergency room, he has multiple lab abnormalities, including hypokalemia, anion gap metabolic acidosis. Glucose was notably elevated at 212.     Past Medical History:      Diagnosis Date    Chronic midline low back pain without sciatica     Congestive heart failure with left ventricular diastolic dysfunction, NYHA class 3 (HCC)     Coronary artery disease involving native coronary artery of native heart without angina pectoris 1-14-15    Drug Eluting Stents x 3 / Dr. Casey Fuentes Erectile dysfunction of non-organic origin     Essential hypertension     Hemorrhagic stroke ([facility] [address]     Hypercholesterolemia     Hyperglycemia 01/27/2020    Ischemic cardiomyopathy     Dr. Jaya Macias / Implanted ICD    Lumbar disc disease with radiculopathy DO Estephanie   vitamin B-12 (CYANOCOBALAMIN) 1000 MCG tablet Take 1,000 mcg by mouth daily    Historical Provider, MD   Cholecalciferol (VITAMIN D) 2000 UNITS CAPS capsule Take 1 capsule by mouth daily. Historical Provider, MD   aspirin 81 MG EC tablet Take 81 mg by mouth daily. Historical Provider, MD       Allergy(ies):  Patient has no known allergies. Social History:  TOBACCO:  reports that he quit smoking about 25 years ago. He has quit using smokeless tobacco.  ETOH:  reports current alcohol use. Family History:      Problem Relation Age of Onset    High Blood Pressure Father     Stroke Father     Diabetes Other        Review of Systems:  Pertinent positives are listed in HPI. At least 10-point ROS reviewed and were negative. Vitals and physical examination:  BP (!) 143/75   Pulse 100   Temp 97 °F (36.1 °C) (Oral)   Resp 22   Ht 5' 6\" (1.676 m)   Wt 215 lb (97.5 kg)   SpO2 95%   BMI 34.70 kg/m²   Gen/overall appearance: Not in acute distress. Alert. Oriented x3. Head: Normocephalic, atraumatic  Eyes: EOMI, good acuity  ENT: Oral mucosa moist  Neck: No JVD, thyromegaly  CVS: Nml S1S2, no MRG, RRR  Pulm: Clear bilaterally. No crackles/wheezes  Gastrointestinal: Soft, NT/ND, +BS  Musculoskeletal: No edema. Warm  Neuro: No focal deficit. Moves extremity spontaneously. Psychiatry: Appropriate affect. Not agitated. Skin: Warm, dry with normal turgor. No rash  Capillary refill: Brisk,< 3 seconds   Peripheral Pulses: +2 palpable, equal bilaterally       Labs/imaging/EKG:  CBC:   Recent Labs     05/12/20 2110   WBC 13.5*   HGB 14.3        BMP:    Recent Labs     05/12/20 2110   *   K 3.3*      CO2 14*   BUN 21*   CREATININE 1.2   GLUCOSE 212*     Hepatic:   Recent Labs     05/12/20 2110   AST 35   ALT 25   BILITOT 0.7   ALKPHOS 95     CXR; Per my review, no acute cardiopulmonary findings. Official read pending.     EKG: Paced ventricular rhythm, nonspecific ST/T

## 2020-05-13 NOTE — ED PROVIDER NOTES
normal.   Eyes:      General:         Right eye: No discharge. Left eye: No discharge. Neck:      Musculoskeletal: Normal range of motion and neck supple. Cardiovascular:      Rate and Rhythm: Regular rhythm. Tachycardia present. Heart sounds: Normal heart sounds. Pulmonary:      Effort: Pulmonary effort is normal. No respiratory distress. Breath sounds: Normal breath sounds. No wheezing or rhonchi. Chest:      Chest wall: No tenderness. Abdominal:      General: Bowel sounds are normal. There is no distension. Palpations: Abdomen is soft. Tenderness: There is no abdominal tenderness. Musculoskeletal: Normal range of motion. Skin:     General: Skin is warm. Neurological:      Mental Status: He is alert and oriented to person, place, and time.    Psychiatric:         Behavior: Behavior normal.         DIAGNOSTIC RESULTS   LABS:    Labs Reviewed   CBC WITH AUTO DIFFERENTIAL - Abnormal; Notable for the following components:       Result Value    WBC 13.5 (*)     Neutrophils Absolute 10.7 (*)     All other components within normal limits    Narrative:     Performed at:  [facility] [address], [place], [facility] [address]   Phone [phone]   COMPREHENSIVE METABOLIC PANEL - Abnormal; Notable for the following components:    Sodium 135 (*)     Potassium 3.3 (*)     CO2 14 (*)     Anion Gap 19 (*)     Glucose 212 (*)     BUN 21 (*)     All other components within normal limits    Narrative:     Beryle Piptiffanys  [employer] tel. [phone],  Chemistry results called to and read back by david Price, 05/12/2020  21:52, by [facility]  Performed at:  [facility] [address] [address]   Phone [phone]   BRAIN NATRIURETIC PEPTIDE - Abnormal; Notable for the following components:    Pro- (*)     All other components within normal limits    Narrative:     Beryle Pippins  [employer] tel. [phone],  Chemistry results called to and read back by david Garcia, 05/12/2020  21:52, by [facility]  Performed at:  [facility] [address] [facility] [address]   Phone [phone]   TROPONIN    Narrative:     Yuko Jesus  ERF tel. [phone],  Chemistry results called to and read back by david Garcia, 05/12/2020  21:52, by [facility]  Performed at:  [facility]  [address] [facility], [address]   Phone [phone]   APTT    Narrative:     Performed at:  [facility] [address]   Phone [phone]   PROTIME-INR    Narrative:     Performed at:  [facility] [address] [address]   Phone [phone]   MAGNESIUM   COMPREHENSIVE METABOLIC PANEL   MAGNESIUM   PHOSPHORUS   CBC WITH AUTO DIFFERENTIAL   TROPONIN   TROPONIN   HEMOGLOBIN A1C   BLOOD GAS, VENOUS   URINALYSIS   BETA-HYDROXYBUTYRATE   TSH WITH REFLEX   POCT GLUCOSE   POCT GLUCOSE   POCT GLUCOSE   POCT GLUCOSE   POCT GLUCOSE   POCT GLUCOSE       All other labs were within normal range or not returned as of this dictation. EKG: All EKG's are interpreted by the Emergency Department Physician in the absence of a cardiologist.  Please see their note for interpretation of EKG. RADIOLOGY:   Non-plain film images such as CT, Ultrasound and MRI are read by the radiologist. Plain radiographic images are visualized and preliminarily interpreted by the ED Provider with the below findings:        Interpretation per the Radiologist below, if available at the time of this note:    XR CHEST PORTABLE   Preliminary Result   Vascular congestion. No results found.         PROCEDURES   Unless otherwise noted below, none     Procedures    CRITICAL CARE TIME   N/A    CONSULTS:  IP CONSULT TO HOSPITALIST  IP CONSULT TO CARDIOLOGY      EMERGENCY DEPARTMENT COURSE and DIFFERENTIAL DIAGNOSIS/MDM: Vitals:    Vitals:    05/12/20 2107 05/12/20 2115 05/12/20 2130   BP: 122/89 127/79 (!) 143/75   Pulse: 109 109 100   Resp: 22     Temp: 97 °F (36.1 °C)     TempSrc: Oral     SpO2: 92% 95% 95%   Weight: 215 lb (97.5 kg)     Height: 5' 6\" (1.676 m)         Patient was given the following medications:  Medications   amiodarone (CORDARONE) 450 mg in sodium chloride 0.9 % 250 mL infusion (1 mg/min Intravenous New Bag 5/12/20 2116)     Followed by   amiodarone (CORDARONE) 450 mg in sodium chloride 0.9 % 250 mL infusion (has no administration in time range)   aspirin tablet 325 mg (has no administration in time range)   potassium chloride (KLOR-CON M) extended release tablet 40 mEq (has no administration in time range)   sodium bicarbonate tablet 1,300 mg (has no administration in time range)   sodium chloride flush 0.9 % injection 10 mL (has no administration in time range)   sodium chloride flush 0.9 % injection 10 mL (has no administration in time range)   acetaminophen (TYLENOL) tablet 650 mg (has no administration in time range)     Or   acetaminophen (TYLENOL) suppository 650 mg (has no administration in time range)   polyethylene glycol (GLYCOLAX) packet 17 g (has no administration in time range)   enoxaparin (LOVENOX) injection 40 mg (has no administration in time range)   insulin lispro (1 Unit Dial) 0-6 Units (has no administration in time range)   insulin lispro (1 Unit Dial) 0-3 Units (has no administration in time range)   glucose (GLUTOSE) 40 % oral gel 15 g (has no administration in time range)   dextrose 50 % IV solution (has no administration in time range)   glucagon (rDNA) injection 1 mg (has no administration in time range)   dextrose 5 % solution (has no administration in time range)   magnesium sulfate 2 g in 50 mL IVPB premix (0 g Intravenous Stopped 5/12/20 2146)       Patient presents for evaluation after being defibrillated while cutting the grass.   On exam, he looks like he does not feel well, pale and diaphoretic. He is tachycardic initially V. tach, however, seemed to convert to sinus tachycardia electronic ventricular pacemaker prior to intervention. Vitals are otherwise stable and he is afebrile. Lungs are clear to auscultation, chest is nontender and abdomen is benign. Started on amiodarone drip and given 2 g of magnesium. Pacer pads in place. Defibrillator/pacemaker to be interrogated by nursing staff. Please see attending note for EKG interpretation. CBC and CMP are remarkable for a bicarb of 14, potassium 3.3. He is hyperglycemic at 212. Mild white count of 13.5. Troponin is negative. Troponin is negative. Coags are within normal limits. BNP 92.  Chest x-ray shows pulmonary vascular congestion. He remained paced and asymptomatic.additional stay in ED. Believe he warrants admission for further evaluation and management of symptoms. My attending spoke with the cardiology nurse practitioner who is agreeable to this plan will consult the patient. Hospitals will resume care the patient at this time. Patient was informed and is agreeable. He is stable for admission. Critical Care  There was a high probability of life-threatening clinical deterioration in the patient's condition requiring my urgent intervention. Total critical care time with the patient was 34 minutes excluding separately reportable procedures. Critical care required due to patients cardiac instability and concern for decompensation        FINAL IMPRESSION      1. Ventricular tachycardia ([address]    2.  Defibrillator discharge          DISPOSITION/PLAN   DISPOSITION        PATIENT REFERREDTO:  Samantha Begrman, [facility],[address]  [phone]            DISCHARGE MEDICATIONS:  New Prescriptions    No medications on file       DISCONTINUED MEDICATIONS:  Discontinued Medications    No medications on file              (Please note that portions of this note were completed with a voice recognition program.  Efforts were made to edit the dictations but occasionally words are mis-transcribed.)    Neel March PA-C (electronically signed)           [place], [place]  05/12/20 6952

## 2020-05-13 NOTE — CONSULTS
[facility]  [phone]      Chief Complaint   Patient presents with    Irregular Heart Beat     Pt brought in by [employer]  EMS from home. Patient was cutting grass when defibrillator fired. Per squad patient received total of 450 amiodorone, and shocked 3 times. History of Present Illness:  Aj Kelley is a 75 y.o. patient who presented to the hospital with complaints of syncope while mowing the grass when the ICD fired. He called EMS. Found to have VT. Had more ICD shocks. I have been asked to provide consultation regarding further management and testing. He has h/o Cardiomyopathy, ef of 35%, CAD. PCI and ICD in 2016. ICD fired 12/19. Past Medical History:   has a past medical history of Chronic midline low back pain without sciatica, Congestive heart failure with left ventricular diastolic dysfunction, NYHA class 3 ([address] Coronary artery disease involving native coronary artery of native heart without angina pectoris, Erectile dysfunction of non-organic origin, Essential hypertension, Hemorrhagic stroke ([address] Hypercholesterolemia, Hyperglycemia, Ischemic cardiomyopathy, Lumbar disc disease with radiculopathy, Major depression single episode, in partial remission ([address] MS (multiple sclerosis) ([address] Non morbid obesity due to excess calories, Rotator cuff tear, TIA (transient ischemic attack), and Ventricular tachycardia ([address] Surgical History:   has a past surgical history that includes Rotator cuff repair; tumor removal; lumbar laminectomy (jan 2014); Coronary angioplasty with stent (1-14-15); Cardiac defibrillator placement (12/2016); pacemaker placement; Cholecystectomy, laparoscopic (N/A, 9/3/2019); ERCP (N/A, 9/12/2019); ERCP (N/A, 9/12/2019); ERCP (N/A, 10/17/2019); and ERCP (N/A, 10/17/2019). Social History:   reports that he quit smoking about 25 years ago. He has quit using smokeless tobacco. He reports current alcohol use.  He reports that he does mmol Intravenous Once Dorys Block MD 62.5 mL/hr at 05/13/20 0915 20 mmol at 05/13/20 0915    amiodarone (CORDARONE) 450 mg in sodium chloride 0.9 % 250 mL infusion  0.5 mg/min Intravenous Continuous Ashley Almonte PA-C 16.7 mL/hr at 05/13/20 6296 0.5 mg/min at 05/13/20 0072    sodium bicarbonate tablet 1,300 mg  1,300 mg Oral 4x Daily Alejo Bautista MD   1,300 mg at 05/13/20 8662    aspirin EC tablet 81 mg  81 mg Oral Daily Alejo Bautista MD   81 mg at 05/13/20 1443    atorvastatin (LIPITOR) tablet 20 mg  20 mg Oral Daily Alejo Bautista MD        carvedilol (COREG) tablet 3.125 mg  3.125 mg Oral BID Alejo Bautista MD   Stopped at 05/13/20 0843    Vitamin D (CHOLECALCIFEROL) tablet 2,000 Units  2,000 Units Oral Daily Alejo Bautista MD        furosemide (LASIX) tablet 20 mg  20 mg Oral Daily Alejo Bautista MD   20 mg at 05/13/20 0917    HYDROcodone-acetaminophen (NORCO) 5-325 MG per tablet 1 tablet  1 tablet Oral Q6H PRN Alejo Bautista MD        potassium chloride (KLOR-CON M) extended release tablet 20 mEq  20 mEq Oral Daily with breakfast Alejo Bautista MD        sacubitril-valsartan (ENTRESTO) 49-51 MG per tablet 1 tablet  1 tablet Oral BID Alejo Bautista MD   1 tablet at 05/13/20 8014    vitamin B-12 (CYANOCOBALAMIN) tablet 1,000 mcg  1,000 mcg Oral Daily Alejo Bautista MD   1,000 mcg at 05/13/20 5576    sodium chloride flush 0.9 % injection 10 mL  10 mL Intravenous 2 times per day Alejo Bautista MD   10 mL at 05/13/20 0916    sodium chloride flush 0.9 % injection 10 mL  10 mL Intravenous PRN Alejo Bautista MD   10 mL at 05/13/20 0817    acetaminophen (TYLENOL) tablet 650 mg  650 mg Oral Q6H PRN Alejo Bautista MD        Or    acetaminophen (TYLENOL) suppository 650 mg  650 mg Rectal Q6H PRN Alejo Bautista MD        polyethylene glycol (GLYCOLAX) packet 17 g  17 g Oral Daily PRN Alejo Bautista MD        enoxaparin (LOVENOX) injection 40 mg ventricular pacemakerUnderlying sinus tach with some atrial paced beatsConfirmed     Pt has CAD with following history:  CABG: (date/details),   Valve replacement (date/details)   GXT/Myoview/Lexiscan: (2019)  (results)    Abnormal myocardial perfusion defect    Fixed inferior wall defect    no reversible ischemia    Decreased LV Systolic function    Overall findings represent a intermediate-high risk study. Stress/echo: (2019) (result)    Suboptimal image quality. Overall left ventricular systolic function appears moderate to severely   reduced. Ejection fraction is visually estimated to be 30-35%. Normal left   ventricular wall thickness and cavity size. There are regional wall motion   abnormalities with akinesis of the basal to apical inferior walls and basal   to mid inferolateral walls. Abnormal (paradoxical) septal motion is present   likely due to right ventricular pacing. Normal right ventricular size and function. Trivial mitral and tricuspid regurgitation. CATH/PCI:  (date)- (results)  - (# and type of stents) -   ECHO: (today) results EF   25 %. Left ventricle is mildly dilated. Severely depressed systolic function with an estimated ejection fraction of   25%. There is hypokinesis of inferior/inferolateral walls. Grade II diastolic dysfunction with elevated LV filling pressures. Mild mitral regurgitation. Aortic valve appears sclerotic but opens adequately. The right ventricle is normal in size and function. Pacer / ICD wire is visualized in the right ventricle. AZAR (date) (results)  EP procedures/studies/ablation:  (specific data). Device information:  Event monitor: (date/results)    All testing and labs listed below were personally reviewed.     Assessment  Patient Active Problem List   Diagnosis    TIA (transient ischemic attack)    Hemorrhagic stroke ([address]    MS (multiple sclerosis) (HCC)    Rotator cuff tear    Lumbar disc disease with radiculopathy    Ischemic

## 2020-05-13 NOTE — OP NOTE
Patient:  Rachel Sanchez   :   1955    Procedural Summary  ~Consent:   Obtained written and verbal consent      Risks/benefits explained in detail  ~Procedure:    Left Heart Catheterization  ~Medications:    Procedural sedation with minimal conscious sedation  ~Complications:   None  ~Blood Loss:    <10cc  ~Specimens:    None obtained  ~Pre-sedation re-evaluation: Performed immediately prior to procedure. Medication and Procedural Reconciliation:  An independent trained observer pushed medications at my direction. We monitored the patient's level of consciousness and vital signs/physiologic status throughout the procedure duration (see start and stop times below). Sedation: 2 mg Versed, 100 mcg Fentanyl  Sedation start: 1339  Sedation stop: 1405    Cardiac Cath LVG:  Anatomy:   LM-distal 50%   LAD-Proximal 60%, mid 80% LAD. Dual LAD system. Cx-Dominant, 100%   Ramus-  large vessel, patent stent  RCA-Small non dominant  LPDA- Collaterals to LPDA  LVEF- 25  LVG- Inferior akinesis, global hypokinesis  LVEDP- 16      Contrast: 76  Flouro Time: 3.8  Access: R radial    Impression  ~Coronary Angiography w/ severe 3VD, Left dominant  ~LVG with LVEF of 25 and inferior regional wall motion abnormalities    Recommendation  ~Aggressive medical treatment and risk factor modification  ~1. Restart heparin gtt. Post cath IVF. Bedrest.  2. Recommend beta blocker, high potency statin, aspirin  3. Referral to cardiac rehab placed  4. Patient has been advised on the importance of regular exercise of at least 20-30 minutes daily. 5. Patient counseled about and offered assistance for smoking cessation   6. Recommend CABG. Discussed with CT surgery. Concern for inferior scar non viable territory. If not candidate for CABG will discuss complex PCI to LAD.         Cassidy Tim MD 2020 2:36 PM

## 2020-05-14 NOTE — PROGRESS NOTES
(MILK OF MAGNESIA) 400 MG/5ML suspension 30 mL, Daily PRN  ondansetron (ZOFRAN) injection 4 mg, Q6H PRN  amiodarone (CORDARONE) 450 mg in sodium chloride 0.9 % 250 mL infusion, Continuous  aspirin EC tablet 81 mg, Daily  atorvastatin (LIPITOR) tablet 20 mg, Daily  carvedilol (COREG) tablet 3.125 mg, BID  Vitamin D (CHOLECALCIFEROL) tablet 2,000 Units, Daily  furosemide (LASIX) tablet 20 mg, Daily  potassium chloride (KLOR-CON M) extended release tablet 20 mEq, Daily with breakfast  sacubitril-valsartan (ENTRESTO) 49-51 MG per tablet 1 tablet, BID  vitamin B-12 (CYANOCOBALAMIN) tablet 1,000 mcg, Daily  acetaminophen (TYLENOL) suppository 650 mg, Q6H PRN  polyethylene glycol (GLYCOLAX) packet 17 g, Daily PRN        Objective:  BP (!) 117/55   Pulse 65   Temp 98.2 °F (36.8 °C) (Temporal)   Resp 14   Ht 5' 6\" (1.676 m)   Wt 207 lb 10.8 oz (94.2 kg)   SpO2 96%   BMI 33.52 kg/m²     Intake/Output Summary (Last 24 hours) at 5/14/2020 0817  Last data filed at 5/14/2020 0400  Gross per 24 hour   Intake 1697 ml   Output 1175 ml   Net 522 ml      Wt Readings from Last 3 Encounters:   05/14/20 207 lb 10.8 oz (94.2 kg)   02/25/20 213 lb (96.6 kg)   01/27/20 209 lb (94.8 kg)       General appearance:  Appears comfortable  Eyes: Sclera clear. Pupils equal.  ENT: Moist oral mucosa. Trachea midline, no adenopathy. Cardiovascular: Regular rhythm, normal S1, S2. No murmur. No edema in lower extremities, AICD intact in left upper anterior chest wall  Respiratory: Not using accessory muscles. Good inspiratory effort. Clear to auscultation bilaterally, no wheeze or crackles. GI: Abdomen soft, no tenderness, not distended, normal bowel sounds  Musculoskeletal: No cyanosis in digits, neck supple  Neurology: CN 2-12 grossly intact. No speech or motor deficits  Psych: Normal affect.  Alert and oriented in time, place and person  Skin: Warm, dry, normal turgor    Labs and Tests:  CBC:   Recent Labs     05/12/20  2110 05/13/20  0045

## 2020-05-14 NOTE — PROGRESS NOTES
[address]   Electrophysiology Progress Note     Admit Date: 2020     Reason for follow up: Atrial fibrillation, Ventricular tachycardia     HPI and Interval History:   Patient seen and examined. Clinical notes reviewed. Telemetry reviewed. No new complaint today. No major events overnight. Denies having chest pain, shortness of breath, dyspnea on exertion, Orthopnea, PND at the time of this visit. 3V disease on cath    Review of System:  All other systems reviewed and are negative except for that noted above. Pertinent negatives are:     · General: negative for fever, chills   · Ophthalmic ROS: negative for - eye pain or loss of vision  · ENT ROS: negative for - headaches, sore throat   · Respiratory: negative for - cough, sputum  · Cardiovascular: Reviewed in HPI  · Gastrointestinal: negative for - abdominal pain, diarrhea, N/V  · Hematology: negative for - bleeding, blood clots, bruising or jaundice  · Genito-Urinary:  negative for - Dysuria or incontinence  · Musculoskeletal: negative for - Joint swelling, muscle pain  · Neurological: negative for - confusion, dizziness, headaches   · Psychiatric: No anxiety, no depression. · Dermatological: negative for - rash      Physical Examination:  Vitals:    20 1230   BP: 108/86   Pulse: 79   Resp: 14   Temp: 98.1 °F (36.7 °C)   SpO2: 98%      In: 1933 [P.O.:600; I.V.:1083]  Out: 1025    Wt Readings from Last 3 Encounters:   20 207 lb 10.8 oz (94.2 kg)   20 213 lb (96.6 kg)   20 209 lb (94.8 kg)     Temp  Av.7 °F (36.5 °C)  Min: 97.1 °F (36.2 °C)  Max: 98.2 °F (36.8 °C)  Pulse  Av.1  Min: 50  Max: 79  BP  Min: 96/47  Max: 133/53  SpO2  Av.2 %  Min: 96 %  Max: 100 %    Intake/Output Summary (Last 24 hours) at 2020 1312  Last data filed at 2020 1230  Gross per 24 hour   Intake 1933 ml   Output 1025 ml   Net 908 ml       · Telemetry: Sinus rhythm paced  · Constitutional: Oriented. No distress. baseline     - Hyponatremia              Adequate intake     - Cardiac resynchronization therapy-defibrillator(CRT-D)             The CIED was interrogated and programmed and I supervised and reviewed all the data. All findings and changes are in device interrogation sheat and reflect my personal interpretation and changes and is scanned to Epic.                  We changed the pacing to LV first by 40 msec              Also ATP sequence changed from 8 to 10 beats     - HTN              BP is well controlled. Continue current meds.     - SAMANTHA              CPAP       I independently reviewed  cardiac cath      NOTE: This report was transcribed using voice recognition software. Every effort was made to ensure accuracy, however, inadvertent computerized transcription errors may be present.

## 2020-05-14 NOTE — PROGRESS NOTES
[facility] Daily Progress Note      Admit Date:  5/12/2020    Chief Complaint   Patient presents with    Irregular Heart Beat     Pt brought in by [facility] from home. Patient was cutting grass when defibrillator fired. Per squad patient received total of 450 amiodorone, and shocked 3 times. Subjective:  Mr. Gideon Sky denies exertional chest pain, SOB/PARRY, PND, palpitations, light-headedness, or edema. More alert today. Can comprehend his current condition. Able to repeat plan.      Objective:   BP (!) 117/55   Pulse 65   Temp 98.2 °F (36.8 °C) (Temporal)   Resp 14   Ht 5' 6\" (1.676 m)   Wt 207 lb 10.8 oz (94.2 kg)   SpO2 96%   BMI 33.52 kg/m²       Intake/Output Summary (Last 24 hours) at 5/14/2020 1201  Last data filed at 5/14/2020 1030  Gross per 24 hour   Intake 1933 ml   Output 975 ml   Net 958 ml       TELEMETRY: paced     Physical Exam:  General:  Awake, alert, oriented x 3, NAD  Skin:  Warm and dry  Neck:  JVD flat  Chest:  normal air entry  Cardiovascular:  RRR S1S2, no S3, no mrmr  Abdomen:  Soft, ND, NT, No HSM  Extremities:  No edema    Medications:    amiodarone  400 mg Oral BID    sodium phosphate IVPB  15 mmol Intravenous Once    sodium chloride flush  10 mL Intravenous 2 times per day    aspirin  81 mg Oral Daily    atorvastatin  20 mg Oral Daily    carvedilol  3.125 mg Oral BID    Vitamin D  2,000 Units Oral Daily    furosemide  20 mg Oral Daily    potassium chloride  20 mEq Oral Daily with breakfast    sacubitril-valsartan  1 tablet Oral BID    vitamin B-12  1,000 mcg Oral Daily      heparin (porcine) 10.54 Units/kg/hr (05/14/20 0911)     heparin (porcine), heparin (porcine), sodium chloride flush, acetaminophen, oxyCODONE-acetaminophen **OR** oxyCODONE-acetaminophen, magnesium hydroxide, ondansetron, [DISCONTINUED] acetaminophen **OR** acetaminophen, polyethylene glycol    Lab Data:  CBC:   Recent Labs     05/12/20  2110 05/13/20  0045   WBC 13.5* 12.5*   HGB 14.3 14.3   HCT 43.1 42.5   MCV 92.8 91.2    208     BMP:   Recent Labs     05/12/20 2110 05/13/20  0045 05/14/20  0240   * 135* 136   K 3.3* 4.1 4.3    106 105   CO2 14* 19* 21   PHOS  --  1.7* 2.1*   BUN 21* 21* 19   CREATININE 1.2 1.0 1.0     LIVER PROFILE:   Recent Labs     05/12/20 2110 05/13/20  0045   AST 35 79*   ALT 25 48*   BILITOT 0.7 1.0   ALKPHOS 95 101     PT/INR:   Recent Labs     05/12/20 2110   PROTIME 12.3   INR 1.06     APTT:   Recent Labs     05/13/20  0900 05/14/20  0240 05/14/20  0745   APTT 74.5* 49.8* 48.6*     BNP:  No results for input(s): BNP in the last 72 hours. IMAGING:   Left ventricle is mildly dilated. Severely depressed systolic function with an estimated ejection fraction of   25%. There is hypokinesis of inferior/inferolateral walls. Grade II diastolic dysfunction with elevated LV filling pressures. Mild mitral regurgitation. Aortic valve appears sclerotic but opens adequately. The right ventricle is normal in size and function. Pacer / ICD wire is visualized in the right ventricle. Cardiac Cath LVG:  Anatomy:   LM-distal 50%   LAD-Proximal 60%, mid 80% LAD. Dual LAD system. Cx-Dominant, 100%   Ramus-  large vessel, patent stent  RCA-Small non dominant  LPDA- Collaterals to LPDA  LVEF- 25  LVG- Inferior akinesis, global hypokinesis  LVEDP- 16    Assessment/Plan:  Active Problems:    Chronic systolic heart failure (HCC)  Plan: NYHA class 2 symptoms. Progressive decline in functional capacity over the past year. Pt is sedentary due to dyspnea and fatigue. Cont lasix. Ventricular tachycardia ([address]  Plan: Likely ischemic in etiology. Patient over exerted himself mowing lawn. Recommend Amiodarone, switch IV to PO. Defibrillator discharge  Plan: for VT. Mgmt per EP    PAF (paroxysmal atrial fibrillation) ([facility])  Plan: per EP      CAD: ischemic cardiomyopathy, severe multi vessel CAD. Recommend CABG.  H/o Inferior infarct s/p PCI but LCX stent is now occluded, a . Severe LAD disease. LVEF dropped from 35 to 25%. H/o fixed defect on nuclear stress test. Unclear if cabg with complete revascularization including bypass to inferior wall will benefit him. He also has high risk of CABG per CT surgery. Will perform viability study to assess inferior wall. If viable than perform CABG. If non viable than will proceed with PCI. Cont aspirin, statin, coreg, entresto    Viability to be done tomorrow and Saturday. Critical Care   Due to the high probability of clinically significant life threating deterioration of the patient's condition that required my urgent intervention, a total critical care time of >35 minutes was used. This time excludes any time that may have been spent performing procedures. This includes but not limited to vital sign monitoring, telemetry monitoring, continuous pulse oximety, IV medication, clinical response to the IV medications, documentation time , consultation time, interpretation of lab data, review of nursing notes and old record review.      All questions and concerns were addressed to the patient/family. Alternatives to my treatment were discussed. The note was completed using EMR.  Every effort was made to ensure accuracy; however, inadvertent computerized transcription errors may be present.        Vishal Pagan MD 5/14/2020 12:01 PM

## 2020-05-15 NOTE — PROGRESS NOTES
Nurse called by , staff walking by door noticed that patient was sitting in chair with blood on his gown and on the floor and tray table in front of him. Patient had removed the IV from his right forearm, stated he \"just pulled on the tape and it fell out. \"  Cleaned patient, changed gown and dressed IV site. Cleaned tray table, serving tray with lunch dishes on it, the cleaned and sanitized floor. Sodium Phosphate infusion on hold until new IV can be placed.

## 2020-05-15 NOTE — PROGRESS NOTES
Palliative Care:     Dr Magdiel Ramirez and Krissy Hernandez CNP met with wife Karo. Cardiac status reviewed. Wife made aware of risks of CABG should heart be viable. Wife went into see patient, he was sleeping. She said she will come back at a later date.

## 2020-05-15 NOTE — PROGRESS NOTES
B-12  1,000 mcg Oral Daily     Continuous Infusions:   heparin (porcine) 10.53 Units/kg/hr (05/14/20 1952)     PRN Meds:heparin (porcine), heparin (porcine), sodium chloride flush, acetaminophen, oxyCODONE-acetaminophen **OR** oxyCODONE-acetaminophen, magnesium hydroxide, ondansetron, [DISCONTINUED] acetaminophen **OR** acetaminophen, polyethylene glycol     Patient Active Problem List    Diagnosis Date Noted    Defibrillator discharge     [facility]     Hyponatremia     Ventricular tachycardia ([facility] 05/12/2020    Hyperglycemia 01/27/2020    Ventricular tachycardia, nonsustained ([facility] 01/14/2020    Bile leak, postoperative 09/11/2019    Bile leak 09/11/2019    Congestive heart failure with left ventricular diastolic dysfunction, NYHA class 3 ([address]     Transaminitis     Acute cholecystitis 08/10/2019    SAMANTHA (obstructive sleep apnea)     Erectile dysfunction of non-organic origin     Cardiomyopathy ([facility]     Chronic systolic heart failure ([facility]     ICD (implantable cardioverter-defibrillator), biventricular, in situ     Biventricular automatic implantable cardioverter defibrillator in situ 12/16/2016    Chronic midline low back pain without sciatica     Major depression single episode, in partial remission ([address]     Benign essential HTN     Non morbid obesity due to excess calories     Hypercholesterolemia     Coronary artery disease involving native coronary artery of native heart without angina pectoris 01/14/2015    Ischemic cardiomyopathy 01/13/2015    TIA (transient ischemic attack)     Hemorrhagic stroke ([address]     MS [facility] sclerosis) [facility]     Rotator cuff tear     Lumbar disc disease with radiculopathy       Active Hospital Problems    Diagnosis Date Noted    Defibrillator discharge [Z45.02]     PAF (paroxysmal atrial fibrillation) ([facility] [I48.0]     Hyponatremia [E87.1]     Ventricular tachycardia ([address] [I47.2] 05/12/2020    Chronic systolic intake     - Cardiac resynchronization therapy-defibrillator(CRT-D)             The CIED was interrogated and programmed and I supervised and reviewed all the data. All findings and changes are in device interrogation sheat and reflect my personal interpretation and changes and is scanned to Epic.                  We changed the pacing to LV first by 40 msec              Also ATP sequence changed from 8 to 10 beats     - HTN              BP is well controlled. Continue current meds.     - SAMANTHA              CPAP       I independently reviewed  cardiac cath      NOTE: This report was transcribed using voice recognition software. Every effort was made to ensure accuracy, however, inadvertent computerized transcription errors may be present.

## 2020-05-15 NOTE — PROGRESS NOTES
Ambulated with patient in hallway again. This time he walked the entire [place] twice before returning to his room. Tolerated well. Returned to bed in the position he sleeps in at home, which is Supine. Applied warm blanket, dimmed lights and asked patient to try to take a nap as he did not sleep any last night. He agreed. Bed alarm on and curtain closed most of the way, but door left ajar to ensure hearing bed alarm if patient again attempted to get out of bed.

## 2020-05-15 NOTE — PROGRESS NOTES
Helped pt ambulating for about 900 feet in hallway with standby assistance. Tolerates ambulation well w/o PARRY. Pt is alert and oriented, on room air, afebrile and denies any pain. Assessment is done and medication given as scheduled. After using bathroom, pt is back to bed. All fall risk prevention in place with bed alarm on. Encourage pt to have a good sleep tonight. Pt becomes confused overtime, forgets using call light and dangles at bedside. After reorientation, pt gets back to bed for sleep. Will continue to monitor.

## 2020-05-15 NOTE — PROGRESS NOTES
Patient seems to be agitated and confused. Not following safety instructions such as using call light and waiting for nurse to assist with IV pole and with lines to ensure that monitoring equipment remains in place. Encouraged patient to ambulate in hallway to get out of room and use some energy. Applied gait belt and second gown as robe and ambulated one lap in hallway then returned to room and back to bed. Shift lead is looking for a recliner so patient can be out of bed and move around some to see if this helps any with his confusion and agitation.

## 2020-05-15 NOTE — PROGRESS NOTES
5-325 MG per tablet 2 tablet, Q4H PRN  magnesium hydroxide (MILK OF MAGNESIA) 400 MG/5ML suspension 30 mL, Daily PRN  ondansetron (ZOFRAN) injection 4 mg, Q6H PRN  aspirin EC tablet 81 mg, Daily  atorvastatin (LIPITOR) tablet 20 mg, Daily  Vitamin D (CHOLECALCIFEROL) tablet 2,000 Units, Daily  furosemide (LASIX) tablet 20 mg, Daily  potassium chloride (KLOR-CON M) extended release tablet 20 mEq, Daily with breakfast  sacubitril-valsartan (ENTRESTO) 49-51 MG per tablet 1 tablet, BID  vitamin B-12 (CYANOCOBALAMIN) tablet 1,000 mcg, Daily  acetaminophen (TYLENOL) suppository 650 mg, Q6H PRN  polyethylene glycol (GLYCOLAX) packet 17 g, Daily PRN        Objective:  BP (!) 106/56   Pulse 66   Temp 98 °F (36.7 °C) (Temporal)   Resp 16   Ht 5' 6\" (1.676 m)   Wt 208 lb 12.4 oz (94.7 kg)   SpO2 99%   BMI 33.70 kg/m²     Intake/Output Summary (Last 24 hours) at 5/15/2020 1020  Last data filed at 5/15/2020 0955  Gross per 24 hour   Intake 876 ml   Output 1230 ml   Net -354 ml      Wt Readings from Last 3 Encounters:   05/15/20 208 lb 12.4 oz (94.7 kg)   02/25/20 213 lb (96.6 kg)   01/27/20 209 lb (94.8 kg)       General appearance:  Appears comfortable  Eyes: Sclera clear. Pupils equal.  ENT: Moist oral mucosa. Trachea midline, no adenopathy. Cardiovascular: Regular rhythm, normal S1, S2. No murmur. No edema in lower extremities  Respiratory: Not using accessory muscles. Good inspiratory effort. Clear to auscultation bilaterally, no wheeze or crackles. GI: Abdomen soft, no tenderness, not distended, normal bowel sounds  Musculoskeletal: No cyanosis in digits, neck supple  Neurology: CN 2-12 grossly intact. No speech or motor deficits  Psych: Normal affect.  Alert and oriented in time, place and person  Skin: Warm, dry, normal turgor    Labs and Tests:  CBC:   Recent Labs     05/12/20  2110 05/13/20  0045 05/15/20  0500   WBC 13.5* 12.5* 6.1   HGB 14.3 14.3 14.0   HCT 43.1 42.5 41.5   MCV 92.8 91.2 91.0    208

## 2020-05-15 NOTE — PROGRESS NOTES
procedures. This includes but not limited to vital sign monitoring, telemetry monitoring, continuous pulse oximety, IV medication, clinical response to the IV medications, documentation time , consultation time, interpretation of lab data, review of nursing notes and old record review.          Navid Saxena MD 5/15/2020 3:13 PM

## 2020-05-16 NOTE — PROGRESS NOTES
[I48.0]     Hyponatremia [E87.1]     Ventricular tachycardia ([facility] [address] [I47.2] 05/12/2020    Chronic systolic heart failure (HCC) [I50.22]     Benign essential HTN [I10]     Coronary artery disease involving native coronary artery of native heart without angina pectoris [I25.10] 01/14/2015    Ischemic cardiomyopathy [I25.5] 01/13/2015     Cath  Cardiac Cath LVG:  Anatomy:   LM-distal 50%   LAD-Proximal 60%, mid 80% LAD. Dual LAD system. Cx-Dominant, 100%   Ramus-  large vessel, patent stent  RCA-Small non dominant  LPDA- Collaterals to LPDA  LVEF- 25  LVG- Inferior akinesis, global hypokinesis  LVEDP- 16     Assessment:       - VT storm:    Patient has had 26 AICD shock multiple ATP for ventricular tachycardia. Continue with p.o. amiodarone. No ICD shocks since admission. Remains hemodynamically stable. Found to have multivessel coronary artery disease and CTS has been consulted for potential bypass.        - Atrial fibrillation    High [id number] score and high risk for stroke and thromboembolism. Anticoagulation is recommended. On IV heparin. Increase Coreg to 12.5 bid. Consider ABELINO MAZE and Clip if going for surgery. - Cardiomyopathy and HFrEF              On GDMT   Increased Coreg 12.5 bid   On Entresto       - HTN needs better control. Increase Coreg to 12.5 bid.      - SAMANTHA              CPAP     Multiple medical condition at risk of decompensation. Needs close monitoring and follow-up. NOTE: This report was transcribed using voice recognition software. Every effort was made to ensure accuracy, however, inadvertent computerized transcription errors may be present.       Marie Blackwell MD, MPH  [facility]   Office: [phone]

## 2020-05-17 NOTE — PROGRESS NOTES
(paroxysmal atrial fibrillation) ([facility] [address] [I48.0]     Hyponatremia [E87.1]     [facility] [I47.2] 05/12/2020    Chronic systolic heart failure (HCC) [I50.22]     Benign essential HTN [I10]     Coronary artery disease involving native coronary artery of native heart without angina pectoris [I25.10] 01/14/2015    Ischemic cardiomyopathy [I25.5] 01/13/2015     Cath  Cardiac Cath LVG:  Anatomy:   LM-distal 50%   LAD-Proximal 60%, mid 80% LAD. Dual LAD system. Cx-Dominant, 100%   Ramus-  large vessel, patent stent  RCA-Small non dominant  LPDA- Collaterals to LPDA  LVEF- 25  LVG- Inferior akinesis, global hypokinesis  LVEDP- 16     Assessment:     - VT storm:    No VT/VF since admission. He has been admitted with VT storm. Had 26 AICD shocks multiple ATPs for ventricular tachycardia. Remained stable on oral amiodarone. Continue with p.o. amiodarone. Found to have multivessel coronary artery disease and CTS has been consulted for potential bypass. Plan for revascularization after viability studies.    - Atrial fibrillation    High [id number] score and high risk for stroke and thromboembolism. Anticoagulation is recommended. On IV heparin. Increased Coreg to 12.5 bid. Consider ABELINO MAZE and Clip if going for surgery. - Cardiomyopathy and HFrEF              On GDMT   Increased Coreg 12.5 bid   On Entresto       - HTN better controlled today. Increase Coreg to 12.5 bid.      - SAMANTHA              CPAP     Remains in coronary care unit. Multiple medical condition at risk of decompensation. Needs close monitoring and follow-up. NOTE: This report was transcribed using voice recognition software. Every effort was made to ensure accuracy, however, inadvertent computerized transcription errors may be present.       Christine Donnelly MD, MPH  [place] [address]   Office: [phone]

## 2020-05-17 NOTE — PROGRESS NOTES
Q4H PRN  oxyCODONE-acetaminophen (PERCOCET) 5-325 MG per tablet 1 tablet, Q4H PRN    Or  oxyCODONE-acetaminophen (PERCOCET) 5-325 MG per tablet 2 tablet, Q4H PRN  magnesium hydroxide (MILK OF MAGNESIA) 400 MG/5ML suspension 30 mL, Daily PRN  ondansetron (ZOFRAN) injection 4 mg, Q6H PRN  aspirin EC tablet 81 mg, Daily  atorvastatin (LIPITOR) tablet 20 mg, Daily  Vitamin D (CHOLECALCIFEROL) tablet 2,000 Units, Daily  furosemide (LASIX) tablet 20 mg, Daily  potassium chloride (KLOR-CON M) extended release tablet 20 mEq, Daily with breakfast  sacubitril-valsartan (ENTRESTO) 49-51 MG per tablet 1 tablet, BID  vitamin B-12 (CYANOCOBALAMIN) tablet 1,000 mcg, Daily  acetaminophen (TYLENOL) suppository 650 mg, Q6H PRN  polyethylene glycol (GLYCOLAX) packet 17 g, Daily PRN        Objective:  BP (!) 125/50   Pulse 58   Temp 97.9 °F (36.6 °C) (Temporal)   Resp 16   Ht 5' 6\" (1.676 m)   Wt 208 lb 1.8 oz (94.4 kg)   SpO2 98%   BMI 33.59 kg/m²     Intake/Output Summary (Last 24 hours) at 5/17/2020 0855  Last data filed at 5/17/2020 3665  Gross per 24 hour   Intake 772 ml   Output 1130 ml   Net -358 ml      Wt Readings from Last 3 Encounters:   05/17/20 208 lb 1.8 oz (94.4 kg)   02/25/20 213 lb (96.6 kg)   01/27/20 209 lb (94.8 kg)       General appearance:  Appears comfortable  Eyes: Sclera clear. Pupils equal.  ENT: Moist oral mucosa. Trachea midline, no adenopathy. Cardiovascular: Regular rhythm, normal S1, S2. No murmur. No edema in lower extremities  Respiratory: Not using accessory muscles. Good inspiratory effort. Clear to auscultation bilaterally, no wheeze or crackles. GI: Abdomen soft, no tenderness, not distended, normal bowel sounds  Musculoskeletal: No cyanosis in digits, neck supple  Neurology: CN 2-12 grossly intact. No speech or motor deficits  Psych: Normal affect.  Alert and oriented in time, place and person  Skin: Warm, dry, normal turgor    Labs and Tests:  CBC:   Recent Labs     05/15/20  0500

## 2020-05-17 NOTE — PROGRESS NOTES
CVTS Surgery       CC: CAD     Syncopal episode/defib shock     TIA/Hemorragic CVA, A-Fib, MS    Heparin gtt infusing      Remains CP free     Paced 60  CTAB RRR     As per CC:     Plan: Thalium Viability study underway per Cardiology, will follow and review once viability completed.   If he is deemed surgical candidate for CABG, EP recommends consideration for ABELINO concomitant Maze

## 2020-05-18 NOTE — PROGRESS NOTES
Palliative Care:     Patient is mildly confused. Patient not a candidate for CABG. Spoke with wife Vicente Patiño, reviewed clinical status. She would like to speak with Dr Elvis Clark. She states patient has been more confused over the weekend. She is concerned about his neurological status. States she \"just wants what is best for Neo. \"

## 2020-05-18 NOTE — PROGRESS NOTES
The CIED was interrogated and programmed and I supervised and reviewed all the data. All findings and changes are in device interrogation sheat and reflect my personal interpretation and changes and is scanned to Epic.                  We changed the pacing to LV first by 40 msec              Also ATP sequence changed from 8 to 10 beats     - HTN              BP is well controlled. Continue current meds.     - SAMANTHA              CPAP       I independently reviewed  cardiac cath      NOTE: This report was transcribed using voice recognition software. Every effort was made to ensure accuracy, however, inadvertent computerized transcription errors may be present.

## 2020-05-18 NOTE — PROGRESS NOTES
CC: CAD    Syncopal episode/defib shock     TIA/Hemorragic CVA, A-Fib, MS     Heparin gtt infusing, strange affect    CV: Paced, HR 59, /50  Pulm: Lungs CTA, 02 sat 94% on RA, afebrile  Renal: Cr 1.2, K 4.6  Heme: WBC 6.7, H/H 14.1/41.8,     Viability Study: 5/15  Large, primarily fixed, inferolateral defect consistent with infarction. Very minimal reversible inferoseptal perfusion c/w a tiny area of border zone viability.     As per CC:     Plan: Will review viability studies. If viable, will consult neurology for further surgical assessment.

## 2020-05-18 NOTE — PROGRESS NOTES
05/18/20  0430   WBC  --  6.7   HGB  --  14.1   HCT  --  41.8   MCV  --  91.4    209     BMP:    Recent Labs     05/18/20  0430   *   K 4.6      CO2 24   BUN 17   CREATININE 1.2   GLUCOSE 117*         Problem List  Active Problems:    Ischemic cardiomyopathy    Benign essential HTN    Coronary artery disease involving native coronary artery of native heart without angina pectoris    Chronic systolic heart failure (HCC)    Ventricular tachycardia (HCC)    Defibrillator discharge    PAF (paroxysmal atrial fibrillation) ([facility] [address]    Hyponatremia  Resolved Problems:    * No resolved hospital problems. *       Assessment & Plan:   1. Continue amiodarone drip, s/p left heart cath with the three-vessel disease CABG recommended for concern inferior scar, CT surgery consulted. Viability study finished, evaluation pending. 2. Elevated troponin, NSTEMI, continue heparin drip. 3. Systolic heart failure with EF 30%, continue Coreg 3.125 mg, furosemide 20 mg daily, continue Entresto  4. Phosphorus normalized today.   5. Multiple problems going on,  patient slightly confused this morning per nursing staff, I am not sure he is able to make decision and discuss pros and cons of possible CABG surgery,  6. Palliative care following the patient. 7.  Effexor for depression    Diet: DIET CARDIAC; Low Sodium (2 GM);  Daily Fluid Restriction: 2000 ml  Diet NPO, After Midnight  Code:Full Code  DVT PPX lovenox       Ramiro Berg MD   5/18/2020 6:34 PM

## 2020-05-19 NOTE — PROGRESS NOTES
Hospitalist Progress Note      PCP: Mary Stauffer DO    Date of Admission: 5/12/2020    Chief Complaint: AICD discharge    Hospital Course: This is a pleasant 70 y. o. male with history of chronic systolic heart failure (most recent echocardiogram from September 2019 with ejection fraction 30 to 35%), history of CAD, essential hypertension, hyperlipidemia, history of CVA, obesity with BMI of 34.8 kg/m², status post AICD placement, who presents to the emergency room with complaints of palpitations and defibrillator discharge.  EP cardiology consulted, recommended to continue IV amiodarone and also p.o. amiodarone started. [facility] cardiology consulted due to elevated troponin left heart catheterization recommended for definitive evaluation of coronary arteries.  The patient underwent left heart catheterization on 5/13/2020.  Severe three-vessel disease found in aggressive medical treatment recommended, also CABG recommended and CT surgery consulted.  Myocardial viability test scheduled and going on. Subjective: Patient denies any complaints of this point. He has been updated on plan of care. He understands the sedation and does not have any additional questions.       Medications:  Reviewed    Infusion Medications    heparin (porcine) 10 Units/kg/hr (05/19/20 3813)     Scheduled Medications    carvedilol  12.5 mg Oral BID    venlafaxine  75 mg Oral Daily with breakfast    amiodarone  400 mg Oral BID    sodium chloride flush  10 mL Intravenous 2 times per day    aspirin  81 mg Oral Daily    atorvastatin  20 mg Oral Daily    Vitamin D  2,000 Units Oral Daily    furosemide  20 mg Oral Daily    potassium chloride  20 mEq Oral Daily with breakfast    sacubitril-valsartan  1 tablet Oral BID    vitamin B-12  1,000 mcg Oral Daily     PRN Meds: heparin (porcine), heparin (porcine), sodium chloride flush, acetaminophen, oxyCODONE-acetaminophen **OR** oxyCODONE-acetaminophen, magnesium hydroxide, ondansetron, [DISCONTINUED] acetaminophen **OR** acetaminophen, polyethylene glycol      Intake/Output Summary (Last 24 hours) at 5/19/2020 1222  Last data filed at 5/19/2020 0926  Gross per 24 hour   Intake 481 ml   Output 350 ml   Net 131 ml       Physical Exam Performed:    BP (!) 119/46   Pulse 53   Temp 96.4 °F (35.8 °C) (Temporal)   Resp 18   Ht 5' 6\" (1.676 m)   Wt 203 lb 11.3 oz (92.4 kg)   SpO2 99%   BMI 32.88 kg/m²     General appearance: No apparent distress, appears stated age and cooperative. HEENT: Pupils equal, round, and reactive to light. Conjunctivae/corneas clear. Neck: Supple, with full range of motion. No jugular venous distention. Trachea midline. Respiratory:  Normal respiratory effort. Clear to auscultation, bilaterally without Rales/Wheezes/Rhonchi. Cardiovascular: Regular rate and rhythm with normal S1/S2 without murmurs, rubs or gallops. Abdomen: Soft, non-tender, non-distended with normal bowel sounds. Musculoskeletal: No clubbing, cyanosis or edema bilaterally. Full range of motion without deformity. Skin: Skin color, texture, turgor normal.  No rashes or lesions. Neurologic:  Neurovascularly intact without any focal sensory/motor deficits. Cranial nerves: II-XII intact, grossly non-focal.  Psychiatric: Alert and oriented, thought content appropriate, normal insight  Capillary Refill: Brisk,< 3 seconds   Peripheral Pulses: +2 palpable, equal bilaterally       Labs:   Recent Labs     05/17/20  0355 05/18/20  0430 05/19/20  0445   WBC  --  6.7  --    HGB  --  14.1  --    HCT  --  41.8  --     209 212     Recent Labs     05/18/20  0430   *   K 4.6      CO2 24   BUN 17   CREATININE 1.2   CALCIUM 9.6   PHOS 3.5     No results for input(s): AST, ALT, BILIDIR, BILITOT, ALKPHOS in the last 72 hours. No results for input(s): INR in the last 72 hours. No results for input(s): [employer] in the last 72 hours.     Urinalysis:      Lab Results

## 2020-05-19 NOTE — PROGRESS NOTES
Diet intake: 26-50%  · Oral Nutrition Supplement (ONS) Orders: None  · Anthropometric Measures:  · Ht: 5' 6\" (167.6 cm)   · Current Body Wt: 203 lb (92.1 kg)  · Ideal Body Wt: 142 lb (64.4 kg),   · BMI Classification: BMI 30.0 - 34.9 Obese Class I    Nutrition Interventions:   Continue current diet, Start ONS  Continued Inpatient Monitoring, Education Not Indicated    Nutrition Evaluation:   · Evaluation: Goals set   · Goals: Pt will consume at least 50% of meals and supplements     · Monitoring: Meal Intake, Supplement Intake, Diet Tolerance      Electronically signed by Jitendra Tarango RD, LD on 5/19/20 at 12:22 PM EDT    Contact Number: [phone]

## 2020-05-19 NOTE — PROGRESS NOTES
3327  Last data filed at 5/19/2020 0828  Gross per 24 hour   Intake 544 ml   Output 500 ml   Net 44 ml       · Telemetry: Sinus rhythm paced  · Constitutional: Oriented. No distress. · Head: Normocephalic and atraumatic. · Mouth/Throat: Oropharynx is clear and moist.   · Eyes: Conjunctivae normal. EOM are normal.   · Neck: Neck supple. No rigidity. No JVD present. · Cardiovascular: Normal rate, regular rhythm, S1&S2. · Pulmonary/Chest: Bilateral respiratory sounds. No wheezes, No rhonchi. · Abdominal: Soft. Bowel sounds present. No distension, No tenderness. · Musculoskeletal: No tenderness. No edema    · Lymphadenopathy: Has no cervical adenopathy. · Neurological: Alert and oriented. Cranial nerve appears intact, No Gross deficit   · Skin: Skin is warm and dry. No rash noted. · Psychiatric: Has a normal behavior     Labs, diagnostic and imaging results reviewed. Reviewed. Recent Labs     05/18/20  0430   *   K 4.6      CO2 24   PHOS 3.5   BUN 17   CREATININE 1.2     Recent Labs     05/17/20  0355 05/18/20  0430 05/19/20  0445   WBC  --  6.7  --    HGB  --  14.1  --    HCT  --  41.8  --    MCV  --  91.4  --     209 212     Lab Results   Component Value Date    CKTOTAL 51 01/04/2017    TROPONINI 0.93 05/13/2020     Estimated Creatinine Clearance: 64 mL/min (based on SCr of 1.2 mg/dL).    No results found for: BNP  Lab Results   Component Value Date    PROTIME 12.3 05/12/2020    PROTIME 12.3 12/08/2016    PROTIME 12.1 07/01/2010    INR 1.06 05/12/2020    INR 1.08 12/08/2016    INR 1.14 07/01/2010     Lab Results   Component Value Date    CHOL 101 01/27/2020    HDL 29 01/27/2020    HDL 28 07/02/2010    TRIG 99 01/27/2020       Scheduled Meds:   carvedilol  12.5 mg Oral BID    venlafaxine  75 mg Oral Daily with breakfast    amiodarone  400 mg Oral BID    sodium chloride flush  10 mL Intravenous 2 times per day    aspirin  81 mg Oral Daily    atorvastatin  20 mg Oral Daily  Vitamin D  2,000 Units Oral Daily    furosemide  20 mg Oral Daily    potassium chloride  20 mEq Oral Daily with breakfast    sacubitril-valsartan  1 tablet Oral BID    vitamin B-12  1,000 mcg Oral Daily     Continuous Infusions:   heparin (porcine) 10 Units/kg/hr (05/19/20 0828)     PRN Meds:heparin (porcine), heparin (porcine), sodium chloride flush, acetaminophen, oxyCODONE-acetaminophen **OR** oxyCODONE-acetaminophen, magnesium hydroxide, ondansetron, [DISCONTINUED] acetaminophen **OR** acetaminophen, polyethylene glycol     Patient Active Problem List    Diagnosis Date Noted    Defibrillator discharge     PA[facility] fibrillation) ([facility] [address]     Hyponatremia     [facility]    Hyperglycemia 01/27/2020    [facility]ust[facility] 01/14/2020    Bile leak, postoperative 09/11/2019    Bile leak 09/11/2019    Congestive heart failure with left ventricular diastolic dysfunction, NYHA class 3 (HCC)     Transaminitis     Acute cholecystitis 08/10/2019    SAMANTHA (obstructive sleep apnea)     Erectile dysfunction of non-organic origin     Cardiomyopathy ([facility] [facility]     Chronic systolic heart failure ([facility]     ICD (implantable cardioverter-defibrillator), biventricular, in situ     Biventricular automatic implantable cardioverter defibrillator in situ 12/16/2016    Chronic midline low back pain without sciatica     Major depression single episode, in partial remission ([address]     Benign essential HTN     Non morbid obesity due to excess calories     Hypercholesterolemia     Coronary artery disease involving native coronary artery of native heart without angina pectoris 01/14/2015    Ischemic cardiomyopathy 01/13/2015    TIA (transient ischemic attack)     Hemorrhagic stroke ([facility]     MS (multiple sclerosis) ([facility] [address]     Rotator cuff tear     Lumbar disc disease with radiculopathy       Active Hospital Problems    Diagnosis Date Noted    weeks and then 200 mg qd afterwards              Consideration to VT ablation in future if recurrence after revascularization      - cardiomyopathy and HFrEF              On GDMT                 Compensated at baseline     - Hyponatremia              Adequate intake     - Cardiac resynchronization therapy-defibrillator(CRT-D)             The CIED was interrogated and programmed and I supervised and reviewed all the data. All findings and changes are in device interrogation sheat and reflect my personal interpretation and changes and is scanned to Epic.                  We changed the pacing to LV first by 40 msec              Also ATP sequence changed from 8 to 10 beats     - HTN              BP is well controlled. Continue current meds.     - SAMANTHA              CPAP       I independently reviewed  cardiac cath      NOTE: This report was transcribed using voice recognition software. Every effort was made to ensure accuracy, however, inadvertent computerized transcription errors may be present.

## 2020-05-19 NOTE — PROGRESS NOTES
195 209 212     BMP:   Recent Labs     05/18/20  0430   *   K 4.6      CO2 24   PHOS 3.5   BUN 17   CREATININE 1.2     LIVER PROFILE:   No results for input(s): AST, ALT, LIPASE, BILIDIR, BILITOT, ALKPHOS in the last 72 hours. Invalid input(s): AMYLASE,  ALB  PT/INR:   No results for input(s): PROTIME, INR in the last 72 hours. APTT:   Recent Labs     05/17/20  2245 05/18/20  0430 05/19/20  0445   APTT 52.3* 48.3* 46.7*     BNP:  No results for input(s): BNP in the last 72 hours. IMAGING:    Summary    Large, primarily fixed, inferolateral defect consistent with infarction.    Very minimal reversible inferoseptal perfusion c/w a tiny area of border    zone viability.             Assessment/Plan:  Active Problems:    Chronic systolic heart failure (HCC)  Plan: NYHA class 2 symptoms. Progressive decline in functional capacity over the past year. Pt is sedentary due to dyspnea and fatigue. Cont lasix. Ventricular tachycardia ([address]  Plan: Likely ischemic in etiology. Patient over exerted himself mowing lawn. Possible VT ablation in the future. Cont amio    Defibrillator discharge  Plan: for VT. Mgmt per EP. Possible VT ablation in the future. Cont amio    PAF [facility]oxysmal [facility] fibrillation) ([facility])  Plan: per EP    CAD: ischemic cardiomyopathy, severe multi vessel CAD. Recommend CABG. H/o Inferior infarct s/p PCI but LCX stent is now occluded, a . Severe LAD disease. LVEF dropped from 35 to 25%. H/o fixed defect on nuclear stress test and no viability in the inferolateral wall. Unclear if cabg with complete revascularization including bypass to inferior wall will benefit him. He also has high risk of CABG per CT surgery. Not a surgical candidate, will likely perform PCI. PCI is high risk due to his anatomy. Cont aspirin, statin, coreg, entresto. Discussed with wife the option of PCI vs. Med therapy. Prognosis is poor without PCI. Wife was concerned about headache he had prior to coming to ED.
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
## 2020-05-20 NOTE — PROGRESS NOTES
Hospitalist Progress Note      PCP: Early Part, DO    Date of Admission: 5/12/2020    Chief Complaint: AICD discharge    Hospital Course: This is a pleasant 70 y. o. male with history of chronic systolic heart failure (most recent echocardiogram from September 2019 with ejection fraction 30 to 35%), history of CAD, essential hypertension, hyperlipidemia, history of CVA, obesity with BMI of 34.8 kg/m², status post AICD placement, who presents to the emergency room with complaints of palpitations and defibrillator discharge.  EP cardiology consulted, recommended to continue IV amiodarone and also p.o. amiodarone started. [facility] cardiology consulted due to elevated troponin left heart catheterization recommended for definitive evaluation of coronary arteries.  The patient underwent left heart catheterization on 5/13/2020.  Severe three-vessel disease found in aggressive medical treatment recommended, also CABG recommended and CT surgery consulted.  Myocardial viability test scheduled and going on. Subjective: Patient denies any complaints of this point. He has been updated on plan of care. He understands the sedation and does not have any additional questions.       Medications:  Reviewed    Infusion Medications    heparin (porcine) 10.53 Units/kg/hr (05/20/20 6087)     Scheduled Medications    carvedilol  12.5 mg Oral BID    venlafaxine  75 mg Oral Daily with breakfast    amiodarone  400 mg Oral BID    sodium chloride flush  10 mL Intravenous 2 times per day    aspirin  81 mg Oral Daily    atorvastatin  20 mg Oral Daily    Vitamin D  2,000 Units Oral Daily    furosemide  20 mg Oral Daily    potassium chloride  20 mEq Oral Daily with breakfast    sacubitril-valsartan  1 tablet Oral BID    vitamin B-12  1,000 mcg Oral Daily     PRN Meds: potassium chloride **OR** potassium alternative oral replacement **OR** potassium chloride, heparin (porcine), heparin (porcine), sodium chloride fib    CAD  Multivessel disease  Nonreversible defect on viability scan  Not a good candidate for CABG, CTS signed off  PCI to LAD plan for tomorrow    PAF  Currently rate controlled  On heparin drip    CHF  EF 25%  On GDM T    HTN  Controlled    SAMANTHA  CPAP at bedtime    DVT Prophylaxis: Heparin  Diet: DIET CARDIAC; Low Sodium (2 GM);  Daily Fluid Restriction: 2000 ml  Dietary Nutrition Supplements: Other Oral Supplement (see comment)  Dietary Nutrition Supplements: Frozen Oral Supplement  Code Status: Full Code    Electronically signed by Chloé Roberson MD on 5/20/2020 at 3:30 PM

## 2020-05-20 NOTE — PROGRESS NOTES
0740  Last data filed at 5/20/2020 0600  Gross per 24 hour   Intake 738.25 ml   Output 375 ml   Net 363.25 ml       · Telemetry: Sinus rhythm paced  · Constitutional: Oriented. No distress. · Head: Normocephalic and atraumatic. · Mouth/Throat: Oropharynx is clear and moist.   · Eyes: Conjunctivae normal. EOM are normal.   · Neck: Neck supple. No rigidity. No JVD present. · Cardiovascular: Normal rate, regular rhythm, S1&S2. · Pulmonary/Chest: Bilateral respiratory sounds. No wheezes, No rhonchi. · Abdominal: Soft. Bowel sounds present. No distension, No tenderness. · Musculoskeletal: No tenderness. No edema    · Lymphadenopathy: Has no cervical adenopathy. · Neurological: Alert and oriented. Cranial nerve appears intact, No Gross deficit   · Skin: Skin is warm and dry. No rash noted. · Psychiatric: Has a normal behavior     Labs, diagnostic and imaging results reviewed. Reviewed. Recent Labs     05/18/20  0430   *   K 4.6      CO2 24   PHOS 3.5   BUN 17   CREATININE 1.2     Recent Labs     05/18/20  0430 05/19/20  0445   WBC 6.7  --    HGB 14.1  --    HCT 41.8  --    MCV 91.4  --     212     Lab Results   Component Value Date    CKTOTAL 51 01/04/2017    TROPONINI 0.93 05/13/2020     Estimated Creatinine Clearance: 64 mL/min (based on SCr of 1.2 mg/dL).    No results found for: BNP  Lab Results   Component Value Date    PROTIME 12.3 05/12/2020    PROTIME 12.3 12/08/2016    PROTIME 12.1 07/01/2010    INR 1.06 05/12/2020    INR 1.08 12/08/2016    INR 1.14 07/01/2010     Lab Results   Component Value Date    CHOL 101 01/27/2020    HDL 29 01/27/2020    HDL 28 07/02/2010    TRIG 99 01/27/2020       Scheduled Meds:   carvedilol  12.5 mg Oral BID    venlafaxine  75 mg Oral Daily with breakfast    amiodarone  400 mg Oral BID    sodium chloride flush  10 mL Intravenous 2 times per day    aspirin  81 mg Oral Daily    atorvastatin  20 mg Oral Daily    Vitamin D  2,000 Units Oral Active Hospital Problems    Diagnosis Date Noted    Defibrillator discharge [Z45.02]     PAF (paroxysmal atrial fibrillation) ([facility] [I48.0]     Hyponatremia [E87.1]     Ventricular tachycardia ([facility] [I47.2] 05/12/2020    Chronic systolic heart failure (HCC) [I50.22]     Benign essential HTN [I10]     Coronary artery disease involving native coronary artery of native heart without angina pectoris [I25.10] 01/14/2015    Ischemic cardiomyopathy [I25.5] 01/13/2015     Cath  Cardiac Cath LVG:  Anatomy:   LM-distal 50%   LAD-Proximal 60%, mid 80% LAD. Dual LAD system. Cx-Dominant, 100%   Ramus-  large vessel, patent stent  RCA-Small non dominant  LPDA- Collaterals to LPDA  LVEF- 25  LVG- Inferior akinesis, global hypokinesis  LVEDP- 16     Assessment:       Plan:     - ICD shock                            He received 26 shocks and several ATP              The first shocks were for Atrial fibrillation and rapid ventricular response but he ended op having several VT and VF episodes after the first few shocks for Atrial fibrillation and rapid ventricular response                 - PO amiodarone 400 mg bid     - Atrial fibrillation                  rapid ventricular response              Will need to be on anticoagulation . Continue IV heparin for now. Will switch to DOAC once no procedure is planned as his [id number] score is at least 6                          I think he will benefit from ablation of Atrial fibrillation. D/W Dr. Juan Mcdermott            - CAD              cath done  PCI to LAD today       - VT storm /VF              Although stress test was negative for ischemia, given several VF episodes in the past,and presence of CAD, I believe he will benefit from revascularization. Not a candidate for CABG.  Recommend to proceed to PCI if possible to reduce recurrence of VT/VF                Continue amiodarone as it covers both Atrial fibrillation and VT for now         Will continue amiodarone 400 mg bid for 1 more week, then 400 mg qd for 2 weeks and then 200 mg qd afterwards              Consideration to VT ablation in future if recurrence after revascularization      - cardiomyopathy and HFrEF              On GDMT                 Compensated at baseline     - Hyponatremia              Adequate intake     - Cardiac resynchronization therapy-defibrillator(CRT-D)             The CIED was interrogated and programmed and I supervised and reviewed all the data. All findings and changes are in device interrogation sheat and reflect my personal interpretation and changes and is scanned to Epic.                  We changed the pacing to LV first by 40 msec              Also ATP sequence changed from 8 to 10 beats     - HTN              BP is well controlled. Continue current meds.     - SAMANTHA              CPAP     - Small infarcts consistent with emboli. Recommend lifelong [facility]. I independently reviewed  cardiac cath      NOTE: This report was transcribed using voice recognition software. Every effort was made to ensure accuracy, however, inadvertent computerized transcription errors may be present.

## 2020-05-20 NOTE — PROGRESS NOTES
TR band completely removed. Site soft, no bleeding, no hematoma. Clear dressing applied. Will monitor.

## 2020-05-20 NOTE — PROGRESS NOTES
[address] Daily Progress Note      Admit Date:  5/12/2020    Chief Complaint   Patient presents with    Irregular Heart Beat     Pt brought in by [facility] from home. Patient was cutting grass when defibrillator fired. Per squad patient received total of 450 amiodorone, and shocked 3 times. Subjective:  Mr. Ga Martini denies exertional chest pain, SOB/PARRY, PND, palpitations, light-headedness, or edema.  .     Objective:   BP (!) 115/52   Pulse 52   Temp 97.8 °F (36.6 °C) (Temporal)   Resp 20   Ht 5' 6\" (1.676 m)   Wt 201 lb 4.5 oz (91.3 kg)   SpO2 97%   BMI 32.49 kg/m²       Intake/Output Summary (Last 24 hours) at 5/20/2020 5955  Last data filed at 5/20/2020 0600  Gross per 24 hour   Intake 617.25 ml   Output 375 ml   Net 242.25 ml       TELEMETRY: Sinus     Physical Exam:  General:  Awake, alert, oriented x 3, NAD  Skin:  Warm and dry  Neck:  JVD flat    Chest:  normal air entry  Cardiovascular:  RRR S1S2, no S3, no mrmr  Abdomen:  Soft, ND, NT, No HSM  Extremities:  No edema    Medications:    carvedilol  12.5 mg Oral BID    venlafaxine  75 mg Oral Daily with breakfast    amiodarone  400 mg Oral BID    sodium chloride flush  10 mL Intravenous 2 times per day    aspirin  81 mg Oral Daily    atorvastatin  20 mg Oral Daily    Vitamin D  2,000 Units Oral Daily    furosemide  20 mg Oral Daily    potassium chloride  20 mEq Oral Daily with breakfast    sacubitril-valsartan  1 tablet Oral BID    vitamin B-12  1,000 mcg Oral Daily      heparin (porcine) 10.53 Units/kg/hr (05/20/20 7656)     potassium chloride **OR** potassium alternative oral replacement **OR** potassium chloride, heparin (porcine), heparin (porcine), sodium chloride flush, acetaminophen, oxyCODONE-acetaminophen **OR** oxyCODONE-acetaminophen, magnesium hydroxide, ondansetron, [DISCONTINUED] acetaminophen **OR** acetaminophen, polyethylene glycol    Lab Data:  CBC:   Recent Labs     05/18/20  0430 05/19/20  0445   WBC

## 2020-05-21 NOTE — PROGRESS NOTES
[facility]agic stroke ([facility]    MS (multiple sclerosis) ([address]    Rotator cuff tear    Lumbar disc disease with radiculopathy    Ischemic cardiomyopathy    Benign essential HTN    Non morbid obesity due to excess calories    Hypercholesterolemia    Chronic midline low back pain without sciatica    Coronary artery disease involving native coronary artery of native heart without angina pectoris    Major depression single episode, in partial remission ([address]    Biventricular automatic implantable cardioverter defibrillator in situ    Cardiomyopathy ([facility]    Chronic systolic heart failure ([facility]    ICD (implantable cardioverter-defibrillator), biventricular, in situ    Erectile dysfunction of non-organic origin    SAMANTHA (obstructive sleep apnea)    Acute cholecystitis    Transaminitis    Congestive heart failure with left ventricular diastolic dysfunction, NYHA class 3 (HCC)    Bile leak, postoperative    Bile leak    Ventricular tachycardia, nonsustained (HCC)    Hyperglycemia    Ventricular tachycardia (HCC)    Defibrillator discharge    PAF (paroxysmal atrial fibrillation) (HCC)    Hyponatremia       RECOMMENDATIONS :  Discussed with patient  Discussed with patient's nurse  Patient has been started on Eliquis  Since he is neurologically stable, I will sign off at this time. Patient will follow with his regular neurologist Dr. Marta Burger after discharge. Please note a portion of this chart was generated using dragon dictation software. Although every effort was made to ensure the accuracy of this automated transcription, some errors in transcription may have occurred.          Sandi Gauthier M.D.

## 2020-05-21 NOTE — PLAN OF CARE
Educated patient and/or family on the importance of attending Cardiac Rehab post procedure. Educational flyer provided.
Post procedure site check completed. Surgical site is within normal limits and appears to be free of complications. All concerns were reviewed and questions answered. Patient verbalized understanding.
Post procedure site check completed. Surgical site is within normal limits and appears to be free of complications. All concerns were reviewed and questions answered. Patient verbalized understanding.
Problem: Falls - Risk of:  Goal: Will remain free from falls  Description: Will remain free from falls  5/16/2020 0125 by Melvin Knott RN  Outcome: Ongoing  Note: Pt verbalized he would use call light before getting up. Pt triggered chair alarm while attempting to get up without assistance, then sat back down on own and waited for RN. Pt assisted back to bed with standby assist and call light placed in reach. Will continue to monitor.
Problem: Falls - Risk of:  Goal: Will remain free from falls  Description: Will remain free from falls  Outcome: Met This Shift  Note: Pt remains free from falls. Safety precautions in place. Bed in lowest position, bed wheels locked, call light within reach, bed alarm on, fall risk wrist band on, SAFE outside of doorway. Responds to pt's call promptly. Ambulates pt with SBA and four-wheel chair. Will continue to monitor. Problem: Infection:  Goal: Will remain free from infection  Description: Will remain free from infection  Outcome: Met This Shift     Problem: Respiratory:  Goal: Respiratory status will improve  Description: Respiratory status will improve  Outcome: Met This Shift  Note: Pt is on room air with O2 Sat>93%. Lung sounds clear upper lobes and diminished lower lobes. Tolerates ambulation well without respiratory distress.
Problem: Falls - Risk of:  Goal: Will remain free from falls  Description: Will remain free from falls  Outcome: Met This Shift  Note: Pt remains free from falls. Safety precautions in place. Bed in lowest position, bed wheels locked, call light within reach, bed alarm on, yellow blanket in place, fall risk wrist band on, SAFE outside of doorway. Will continue to monitor. Problem: Infection:  Goal: Will remain free from infection  Description: Will remain free from infection  Outcome: Met This Shift     Problem: Cardiac:  Goal: Ability to maintain an adequate cardiac output will improve  Description: Ability to maintain an adequate cardiac output will improve  Outcome: Ongoing     Problem: Cardiac:  Goal: Hemodynamic stability will improve  Description: Hemodynamic stability will improve  Outcome: Met This Shift     Problem: Fluid Volume:  Goal: Ability to achieve and maintain adequate urine output will improve  Description: Ability to achieve and maintain adequate urine output will improve  Outcome: Met This Shift  Note: Urine output 400cc this shift. No sign/symptom of fluid imbalance. Problem: Respiratory:  Goal: Respiratory status will improve  Description: Respiratory status will improve  Outcome: Met This Shift  Note: On room air with O2 Sat>95%. Lung sounds clear upper lobes and diminished lower lobes. No respiratory distress with ambulation.
Pt remains free of falls. Fall risk protocol in place. Bed locked in lowest position. Call light in reach. Pt instructed to call for assistance, verbalizes understanding. Will continue to monitor. Problem: Cardiac:  Goal: Ability to maintain an adequate cardiac output will improve  Description: Ability to maintain an adequate cardiac output will improve  Outcome: Ongoing : pt went for viability study . Results pending . Will re evaluate on Monday as to how to proceed with plan of care. Problem: Fluid Volume:  Goal: Ability to achieve and maintain adequate urine output will improve  Description: Ability to achieve and maintain adequate urine output will improve  Outcome: Ongoing daily weights, I & O.continue to monitor.
of decreased cardiac output when ambulating on unit. Patient was able to carry on conversation while walking and no shortness of breath with activity.   Goal: Hemodynamic stability will improve  Description: Hemodynamic stability will improve  Outcome: Ongoing     Problem: Fluid Volume:  Goal: Ability to achieve and maintain adequate urine output will improve  Description: Ability to achieve and maintain adequate urine output will improve  Outcome: Ongoing     Problem: Respiratory:  Goal: Respiratory status will improve  Description: Respiratory status will improve  Outcome: Met This Shift

## 2020-05-21 NOTE — PROGRESS NOTES
Ike 79   Electrophysiology Progress Note     Admit Date: 2020     Reason for follow up: Atrial fibrillation, Ventricular tachycardia     HPI and Interval History:   Patient seen and examined. Clinical notes reviewed. Telemetry reviewed. No new complaint today. No major events overnight. Denies having chest pain, shortness of breath, dyspnea on exertion, Orthopnea, PND at the time of this visit. I had a discussion with him and he seems to understand what is going on. I also discussed with his wife about Atrial fibrillation and VT management      Review of System:  All other systems reviewed and are negative except for that noted above. Pertinent negatives are:     · General: negative for fever, chills   · Ophthalmic ROS: negative for - eye pain or loss of vision  · ENT ROS: negative for - headaches, sore throat   · Respiratory: negative for - cough, sputum  · Cardiovascular: Reviewed in HPI  · Gastrointestinal: negative for - abdominal pain, diarrhea, N/V  · Hematology: negative for - bleeding, blood clots, bruising or jaundice  · Genito-Urinary:  negative for - Dysuria or incontinence  · Musculoskeletal: negative for - Joint swelling, muscle pain  · Neurological: negative for - confusion, dizziness, headaches   · Psychiatric: No anxiety, no depression.   · Dermatological: negative for - rash      Physical Examination:  Vitals:    20 0755   BP: (!) 133/53   Pulse: 65   Resp: 16   Temp: 97.8 °F (36.6 °C)   SpO2: 97%      In: 915 [P.O.:480; I.V.:435]  Out: 890    Wt Readings from Last 3 Encounters:   20 198 lb 10.2 oz (90.1 kg)   20 213 lb (96.6 kg)   20 209 lb (94.8 kg)     Temp  Av.4 °F (36.3 °C)  Min: 96.9 °F (36.1 °C)  Max: 97.8 °F (36.6 °C)  Pulse  Av.7  Min: 50  Max: 65  BP  Min: 91/43  Max: 141/48  SpO2  Av %  Min: 94 %  Max: 100 %    Intake/Output Summary (Last 24 hours) at 2020 6224  Last data filed at 2020 0692  Gross per 24 Oral Daily with breakfast    sacubitril-valsartan  1 tablet Oral BID    vitamin B-12  1,000 mcg Oral Daily     Continuous Infusions:    PRN Meds:sodium chloride flush, acetaminophen, oxyCODONE-acetaminophen **OR** oxyCODONE-acetaminophen, magnesium hydroxide, ondansetron, potassium chloride **OR** potassium alternative oral replacement **OR** potassium chloride, [DISCONTINUED] acetaminophen **OR** acetaminophen, polyethylene glycol     Patient Active Problem List    Diagnosis Date Noted    Defibrillator discharge     [facility] fibrillation) [facility]     Hyponatremia     [facility] 05/12/2020    Hyperglycemia 01/27/2020    Ventricular tachycardia, nonsustained ([facility] 01/14/2020    Bile leak, postoperative 09/11/2019    Bile leak 09/11/2019    Congestive heart failure with left ventricular diastolic dysfunction, NYHA class 3 ([facility]     Transaminitis     Acute cholecystitis 08/10/2019    SAMANTHA (obstructive sleep apnea)     Erectile dysfunction of non-organic origin     Cardiomyopathy ([facility]     Chronic systolic heart failure ([facility]     ICD (implantable cardioverter-defibrillator), biventricular, in situ     Biventricular automatic implantable cardioverter defibrillator in situ 12/16/2016    Chronic midline low back pain without sciatica     Major depression single episode, in partial remission ([facility]     Benign essential HTN     Non morbid obesity due to excess calories     Hypercholesterolemia     Coronary artery disease involving native coronary artery of native heart without angina pectoris 01/14/2015    Ischemic cardiomyopathy 01/13/2015    TIA (transient ischemic attack)     Hemorrhagic stroke ([facility]     MS (multiple sclerosis) ([facility]     Rotator cuff tear     Lumbar disc disease with radiculopathy       Active Hospital Problems    Diagnosis Date Noted    Defibrillator discharge [Z45.02]     PAF (paroxysmal atrial fibrillation) ([facility] [I48.0]     Hyponatremia [E87.1]     Ventricular tachycardia ([facility] [I47.2] 05/12/2020    Chronic systolic heart failure (HCC) [I50.22]     Benign essential HTN [I10]     Coronary artery disease involving native coronary artery of native heart without angina pectoris [I25.10] 01/14/2015    Ischemic cardiomyopathy [I25.5] 01/13/2015     Cath  Cardiac Cath LVG:  Anatomy:   LM-distal 50%   LAD-Proximal 60%, mid 80% LAD. Dual LAD system. Cx-Dominant, 100%   Ramus-  large vessel, patent stent  RCA-Small non dominant  LPDA- Collaterals to LPDA  LVEF- 25  LVG- Inferior akinesis, global hypokinesis  LVEDP- 16     Assessment:       Plan:     - ICD shock                            He received 26 shocks and several ATP              The first shocks were for Atrial fibrillation and rapid ventricular response but he ended op having several VT and VF episodes after the first few shocks for Atrial fibrillation and rapid ventricular response                 - PO amiodarone 200 mg qd     - Atrial fibrillation                  rapid ventricular response              Will need to be on anticoagulation . Continue IV heparin for now. Will switch to DOAC once no procedure is planned as his [id number] score is at least 6   On eliquis                          I think he will benefit from ablation of Atrial fibrillation. D/W Dr. Efrain Chaidez       Will have him scheduled for ablation of Atrial fibrillation          - CAD              cath done  S/p PCI to LAD         - VT storm /VF              Although stress test was negative for ischemia, given several VF episodes in the past,and presence of CAD, I believe he will benefit from revascularization. Not a candidate for CABG.  Recommend to proceed to PCI if possible to reduce recurrence of VT/VF                Continue amiodarone as it covers both Atrial fibrillation and VT for now         Will continue amiodarone 400 mg bid for 1 more week, then 400 mg qd for 2 weeks and then 200 mg qd afterwards Consideration to VT ablation in future if recurrence after revascularization      - cardiomyopathy and HFrEF              On GDMT                 Compensated at baseline     - Hyponatremia              Adequate intake     - Cardiac resynchronization therapy-defibrillator(CRT-D)             The CIED was interrogated and programmed and I supervised and reviewed all the data. All findings and changes are in device interrogation sheat and reflect my personal interpretation and changes and is scanned to Epic.                  We changed the pacing to LV first by 40 msec              Also ATP sequence changed from 8 to 10 beats     - HTN              BP is well controlled. Continue current meds.     - SAMANTHA              CPAP     - Small infarcts consistent with emboli. Recommend lifelong [facility]. I independently reviewed  cardiac cath        He can be discharged from EP standpoint with plan for ablation of Atrial fibrillation     NOTE: This report was transcribed using voice recognition software. Every effort was made to ensure accuracy, however, inadvertent computerized transcription errors may be present.

## 2020-05-22 NOTE — PROGRESS NOTES
Physical Therapy    Facility/Department: [facility]  Initial Assessment    NAME: Tg Rodriguez  : 1955  MRN: [id number]    Date of Service: 2020    Discharge Recommendations:Trino Rockwell scored a 19/24 on the AM-PAC short mobility form. Current research shows that an AM-PAC score of 18 or greater is typically associated with a discharge to the patient's home setting. Based on the patient's AM-PAC score and their current functional mobility deficits, it is recommended that the patient have 2-3 sessions per week of Physical Therapy at d/c to increase the patient's independence. At this time, this patient demonstrates the endurance and safety to discharge home with home services and a follow up treatment frequency of 2-3x/wk. Please see assessment section for further patient specific details. If patient discharges prior to next session this note will serve as a discharge summary. Please see below for the latest assessment towards goals. HOME HEALTH CARE: LEVEL 3 SAFETY     - Initial home health evaluation to occur within 24-48 hours, in patient home   - Therapy evaluations in home within 24-48 hours of discharge; including DME and home safety   - Frontload therapy 5 days, then 3x a week   - Therapy to evaluate if patient has [address] needs for personal care   -  evaluation within 24-48 hours, includes evaluation of resources and insurance to determine AL, [place], LTC, and Medicaid options       PT Equipment Recommendations  Equipment Needed: Yes  Mobility Devices: Celestia Noel: Rolling    Assessment   Body structures, Functions, Activity limitations: Decreased functional mobility ; Decreased ADL status; Decreased strength;Decreased endurance;Decreased balance  Assessment: Pt present as below his baseline function. Pt would benefit from skilled PT services to promtoe safe return to PLOF.   Prognosis: Good  Decision Making: Low Complexity  PT Education: Goals;PT Role;Plan of Care  Patient Group Co-treatment   Time In [facility] 10         Time Out 0921         Minutes 38         Timed Code Treatment Minutes: [address], PT   Bay Helton, DPT, [id number]

## 2020-05-22 NOTE — DISCHARGE INSTR - COC
Continuity of Care Form    Patient Name: Joana Tyler   :  1955  MRN:  [id number]    Admit date:  2020  Discharge date:  ***    Code Status Order: Full Code   Advance Directives:   Advance Care Flowsheet Documentation     Date/Time Healthcare Directive Type of Healthcare Directive Copy in [address] Agent's Name Healthcare Agent's Phone Number    20 0012  No, patient does not have an advance directive for healthcare treatment -- -- -- -- --          Admitting Physician:  Devin Webb MD  PCP: Tyra Love DO    Discharging Nurse: [facility] Unit/Room#: TPD-1907/9532-04  Discharging Unit Phone Number: ***    Emergency Contact:   Extended Emergency Contact Information  Primary Emergency Contact: Nata Blanco  Address: [address] [place] [address] Jacob Whitehead [address] Phone: [phone]  Mobile Phone: [phone]  Relation: Spouse  Secondary Emergency Contact: Violetta Haile, [address] [place] [address] Phone: [phone]  Mobile Phone: [phone]  Relation: Child    Past Surgical History:  Past Surgical History:   Procedure Laterality Date    CARDIAC DEFIBRILLATOR PLACEMENT  2016    CHOLECYSTECTOMY, LAPAROSCOPIC N/A 9/3/2019    LAPAROSCOPIC CHOLECYSTECTOMY performed by Bernabe Blue MD at [address] [place] [address] Rd S  1-14-15    Drug Eluting Stents x 3    CORONARY ANGIOPLASTY WITH STENT PLACEMENT  2020    2 stents / Dr. King Starks ERCP N/A 2019    ERCP STENT INSERTION performed by Curtis Schrader MD at [facility]  ERCP N/A 2019    ERCP SPHINCTER/PAPILLOTOMY performed by Curtis Schrader MD at [facility]  ERCP N/A 10/17/2019    ERCP STENT REMOVAL performed by Curtis Schrader MD at [facility] ERCP N/A 10/17/2019    ERCP BALLOON SWEEP performed by Curtis Schrader MD at [address] [place] [address]    dr Dajuan Rick Ventricular tachycardia, nonsustained (HCC) I47.2    Hyperglycemia R73.9    Ventricular tachycardia (HCC) I47.2    Defibrillator discharge Z45.02    PAF (paroxysmal atrial fibrillation) (HCC) I48.0    Hyponatremia E87.1       Isolation/Infection:   Isolation          No Isolation        Patient Infection Status     None to display          Nurse Assessment:  Last Vital Signs: /78   Pulse 60   Temp 97 °F (36.1 °C) (Temporal)   Resp 18   Ht 5' 6\" (1.676 m)   Wt 199 lb 1.2 oz (90.3 kg)   SpO2 93%   BMI 32.13 kg/m²     Last documented pain score (0-10 scale): Pain Level: 0  Last Weight:   Wt Readings from Last 1 Encounters:   20 199 lb 1.2 oz (90.3 kg)     Mental Status:  {IP PT MENTAL STATUS:}    IV Access:  { QUOC IV ACCESS:058456672}    Nursing Mobility/ADLs:  Walking   {[facility] DME WYYL:337827077}  Transfer  {[facility] DME CYRY:597235416}  Bathing  {P DME KLRW:921440468}  Dressing  {P DME GCQJ:047714686}  Toileting  {P DME MDRR:544076498}  Feeding  {[facility] DME UCYN:832879956}  Med Admin  {P DME UTJQ:239392828}  Med Delivery   { QUOC MED Delivery:619224441}    Wound Care Documentation and Therapy:        Elimination:  Continence:   · Bowel: {YES / J}  · Bladder: {YES / EC:88856}  Urinary Catheter: {Urinary Catheter:786467789}   Colostomy/Ileostomy/Ileal Conduit: {YES / SV:01499}       Date of Last BM: ***    Intake/Output Summary (Last 24 hours) at 2020 1016  Last data filed at 2020 0449  Gross per 24 hour   Intake 320 ml   Output 200 ml   Net 120 ml     I/O last 3 completed shifts:   In: 12 [P.O.:540; I.V.:20]  Out: 200 [Urine:200]    Safety Concerns:     [address] Safety Concerns:996856441}    Impairments/Disabilities:      [address] Impairments/Disabilities:136832010}    Nutrition Therapy:  Current Nutrition Therapy:   [address] QUOC Diet List:271742320}    Routes of Feeding: {CHP DME Other Feedings:713948671}  Liquids: {Slp liquid thickness:06828}  Daily Fluid Restriction: {P DME

## 2020-05-22 NOTE — PROGRESS NOTES
UB ADL mod I  Short term goal 2: LB ADL mod I  Short term goal 3: Fxl transfers mod I  Short term goal 4: Fxl mob mod I  Short term goal 5:  Toileting mod I  Long term goals  Time Frame for Long term goals : LTG=STG       Therapy Time   Individual Concurrent Group Co-treatment   Time In 0843         Time Out 0921         Minutes 38            Timed Code Treatment Minutes:   23 minutes    Total Treatment Minutes:  38 minutes    Chio Richard, [facility] OTR/L [id number]    Chio Richard, OT

## 2020-05-22 NOTE — PROGRESS NOTES
[facility] home care referral. Spoke with p pt's wife and re: home care plan of care/services. Agreeable. Demographic's verified. Will follow for home care.

## 2020-05-23 NOTE — DISCHARGE SUMMARY
Hospital Medicine Discharge Summary    Patient ID: Anabel Alaniz      Patient's PCP: Early Part, DO    Admit Date: 5/12/2020     Discharge Date: 5/22/2020      Admitting Physician: Chip Hutchison MD     Discharge Physician: Yin Harris MD     Discharge Diagnoses: Active Hospital Problems    Diagnosis    Defibrillator discharge [Z45.02]    PAF (paroxysmal atrial fibrillation) ([facility] [I48.0]    Hyponatremia [E87.1]    Ventricular tachycardia (HCC) [I47.2]    Chronic systolic heart failure (HCC) [I50.22]    Benign essential HTN [I10]    Coronary artery disease involving native coronary artery of native heart without angina pectoris [I25.10]    Ischemic cardiomyopathy [I25.5]       The patient was seen and examined on day of discharge and this discharge summary is in conjunction with any daily progress note from day of discharge. Hospital Course: This is a pleasant 70 y. o. male with history of chronic systolic heart failure (most recent echocardiogram from September 2019 with ejection fraction 30 to 35%), history of CAD, essential hypertension, hyperlipidemia, history of CVA, obesity with BMI of 34.8 kg/m², status post AICD placement, who presents to the emergency room with complaints of palpitations and defibrillator discharge.  EP cardiology consulted, recommended to continue IV amiodarone and also p.o. amiodarone started. [facility] cardiology consulted due to elevated troponin left heart catheterization recommended for definitive evaluation of coronary arteries.  The patient underwent left heart catheterization on 5/13/2020.  Severe three-vessel disease found in aggressive medical treatment recommended, also CABG recommended and CT surgery consulted.  Myocardial viability test scheduled and shows nonreversible defect. Defibrillator discharge  In response to multiple episodes of VT as well as atrial fibrillation  Electrophysiology on board  Continue with p.o.

## 2020-05-28 NOTE — TELEPHONE ENCOUNTER
Spoke with wife she stated [facility]  and was wondering if doctor david could call to let them know she is coming with pt since  she cannot go in with him to e.r and explain what is wrong with him? I told her we typically did not do that but would put in message.

## 2020-05-28 NOTE — ED PROVIDER NOTES
[facility]        Pt Name: Kavon Whiteside  MRN: [id number]  [place] 1955  Date of evaluation: 5/28/2020  Provider: MARU Romero  PCP: Briana Rossi, DO     I have seen and evaluated this patient with my supervising physician Judy 4       Chief Complaint   Patient presents with    Altered Mental Status     Pt had stents put in last week, internal defib went off 27 times per wife. Reporting pt hsa been in a \"fog an dhaving blurry vision\" since he got discharged. wife reporting BP low at home, 70's/50's. 112/64 in tirage. HISTORY OF PRESENT ILLNESS   (Location, Timing/Onset, Context/Setting, Quality, Duration, Modifying Factors, Severity, Associated Signs and Symptoms)  Note limiting factors. Kavon Whiteside is a 75 y.o. male with past medical history of MS currently followed up with [facility] neurology, hyperlipidemia, previous stroke, hypertension, congestive heart failure, CAD, obesity and chronic back pain who presents to the ED with complaint of altered mental status. Wife is a primary historian at this time. Wife  was recently admitted. States admitted for fatigue and weakness and had cardiac catheterization by cardiologist, Dr. Michael Kruger, and had 2 stents placed in his heart. Wife states they were talking out doing open heart surgery but unfortunately his heart was \"too damaged\". States is been discharged for about a week. Patient states since he has been discharged she has had increasing fatigue, weakness and altered mental status. Wife states patient has been clean complaining of \"being in a fog\". States he is been complaining of blurry vision to both eyes as well. Patient states he \"needs glasses\".   Denies headache, speech disturbances, numbness/tingling, lightheadedness/dizziness, chest pain, shortness of breath, abdominal pain, nausea/vomiting, decreased oral intake, Disp-60 tablet, R-0Normal      venlafaxine (EFFEXOR XR) 75 MG extended release capsule Take 1 capsule by mouth daily (with breakfast), Disp-30 capsule, R-3Normal      atorvastatin (LIPITOR) 20 MG tablet Take 1 tablet by mouth daily, Disp-30 tablet, R-3Normal      carvedilol (COREG) 12.5 MG tablet Take 1 tablet by mouth 2 times daily, Disp-60 tablet, R-3Normal      sacubitril-valsartan (ENTRESTO) 49-51 MG per tablet Take 1 tablet by mouth 2 times daily, Disp-60 tablet, R-0Normal      furosemide (LASIX) 20 MG tablet Take 1 tablet by mouth daily, Disp-60 tablet, R-3Normal      clopidogrel (PLAVIX) 75 MG tablet Take 1 tablet by mouth daily, Disp-30 tablet, R-3Normal      HYDROcodone-acetaminophen (NORCO) 5-325 MG per tablet Take 1 tablet by mouth every 6 hours as needed for Pain. Historical Med      potassium chloride (KLOR-CON M) 20 MEQ extended release tablet Take 0.5 tablets by mouth daily, Disp-90 tablet, R-3Normal      vitamin B-12 (CYANOCOBALAMIN) 1000 MCG tablet Take 1,000 mcg by mouth dailyHistorical Med      Cholecalciferol (VITAMIN D) 2000 UNITS CAPS capsule Take 1 capsule by mouth daily. aspirin 81 MG EC tablet Take 81 mg by mouth daily. ALLERGIES     Patient has no known allergies.     FAMILYHISTORY       Family History   Problem Relation Age of Onset    High Blood Pressure Father     Stroke Father     Diabetes Other           SOCIAL HISTORY       Social History     Tobacco Use    Smoking status: Former Smoker     Last attempt to quit: 1994     Years since quittin.8    Smokeless tobacco: Former User    Tobacco comment: smoked on and off as a teen   Substance Use Topics    Alcohol use: Yes     Comment: 1 can of beer every 3 months    Drug use: No       SCREENINGS    Leti Coma Scale  Eye Opening: Spontaneous  Best Verbal Response: Confused  Best Motor Response: Obeys commands  Leti Coma Scale Score: 14        PHYSICAL EXAM    (up to 7 for level 4, 8 or more for level 5)     ED Triage Vitals [05/28/20 1747]   BP Temp Temp Source Pulse Resp SpO2 Height Weight   112/64 97.9 °F (36.6 °C) Oral 58 16 99 % 5' 6\" (1.676 m) 196 lb 5 oz (89 kg)       Physical Exam  Constitutional:       General: He is not in acute distress. Appearance: He is well-developed. He is not ill-appearing, toxic-appearing or diaphoretic. HENT:      Head: Normocephalic and atraumatic. Right Ear: External ear normal.      Left Ear: External ear normal.   Eyes:      General:         Right eye: No discharge. Left eye: No discharge. Extraocular Movements: Extraocular movements intact. Conjunctiva/sclera: Conjunctivae normal.      Pupils: Pupils are equal, round, and reactive to light. Neck:      Musculoskeletal: Normal range of motion and neck supple. No neck rigidity or muscular tenderness. Cardiovascular:      Rate and Rhythm: Normal rate and regular rhythm. Pulses: Normal pulses. Heart sounds: Normal heart sounds. No murmur. No friction rub. No gallop. Comments: 2+ radial pulses bilaterally. No pedal edema. No calf tenderness. No JVD. Pulmonary:      Effort: Pulmonary effort is normal. No respiratory distress. Breath sounds: Normal breath sounds. No stridor. No wheezing, rhonchi or rales. Chest:      Chest wall: No tenderness. Abdominal:      General: Abdomen is flat. Bowel sounds are normal. There is no distension. Palpations: Abdomen is soft. There is no mass. Tenderness: There is no abdominal tenderness. There is no right CVA tenderness, left CVA tenderness, guarding or rebound. Hernia: No hernia is present. Musculoskeletal: Normal range of motion. Lymphadenopathy:      Cervical: No cervical adenopathy. Skin:     General: Skin is warm and dry. Coloration: Skin is not pale. Findings: No erythema or rash. Neurological:      General: No focal deficit present.       Mental Status: He is alert and oriented to person, place, and time. GCS: GCS eye subscore is 4. GCS verbal subscore is 5. GCS motor subscore is 6. Cranial Nerves: No cranial nerve deficit. Sensory: No sensory deficit. Motor: Motor function is intact. Coordination: Coordination is intact. Comments: Gait deferred at this time. Cranial nerves II through XII intact. ANO x3. GCS 15. Full range of motion strength to the upper and lower extremities throughout. Distal neurovascular intact. Sensation intact in all 4 extremities.    Psychiatric:         Behavior: Behavior normal.         DIAGNOSTIC RESULTS   LABS:    Labs Reviewed   CBC WITH AUTO DIFFERENTIAL - Abnormal; Notable for the following components:       Result Value    Lymphocytes Absolute 0.5 (*)     All other components within normal limits    Narrative:     Performed at:  [facility] [address] [employer]   Phone [phone]   COMPREHENSIVE METABOLIC PANEL W/ REFLEX TO MG FOR LOW K - Abnormal; Notable for the following components:    Sodium 135 (*)     Glucose 119 (*)     BUN 31 (*)     CREATININE 1.4 (*)     GFR Non- 51 (*)     All other components within normal limits    Narrative:     Performed at:  [facility] [address]   Phone [phone]   TROPONIN - Abnormal; Notable for the following components:    Troponin 0.02 (*)     All other components within normal limits    Narrative:     Performed at:  [facility] [address] [employer]   Phone [phone]   PROTIME-INR - Abnormal; Notable for the following components:    Protime 22.0 (*)     INR 1.88 (*)     All other components within normal limits    Narrative:     Performed at:  [facility] [address] [employer]   Phone [phone]   BRAIN NATRIURETIC PEPTIDE - Abnormal; Notable for the following components:    Pro- (*)     All other components within normal limits    Narrative:     Performed at:  [facility]  [address] [facility], [address]   Phone [phone]   URINE RT REFLEX TO CULTURE - Abnormal; Notable for the following components:    Clarity, UA CLOUDY (*)     Blood, Urine MODERATE (*)     All other components within normal limits    Narrative:     Performed at:  [facility]  [address] [facility] [address]   Phone [phone]   MICROSCOPIC URINALYSIS - Abnormal; Notable for the following components:    RBC, UA 14 (*)     All other components within normal limits    Narrative:     Performed at:  [facility]  [address] [facility], [address] [employer]   Phone [phone]       All other labs were within normal range or not returned as of this dictation. EKG: All EKG's are interpreted by the Emergency Department Physician in the absence of a cardiologist.  Please see their note for interpretation of EKG. RADIOLOGY:   Non-plain film images such as CT, Ultrasound and MRI are read by the radiologist. Plain radiographic images are visualized and preliminarily interpreted by the ED Provider with the below findings:        Interpretation per the Radiologist below, if available at the time of this note:    CT Head WO Contrast   Preliminary Result   No acute intracranial abnormality. Atrophy and small-vessel disease ischemic   changes as previously seen as well. Old left occipital infarct. XR CHEST PORTABLE   Final Result   No evidence of acute cardiopulmonary disease. No results found.         PROCEDURES   Unless otherwise noted below, none     Procedures    CRITICAL CARE TIME   N/A    CONSULTS:  IP CONSULT TO HOSPITALIST      EMERGENCY DEPARTMENT COURSE and DIFFERENTIAL DIAGNOSIS/MDM:   Vitals:    Vitals:    05/28/20 2000 05/28/20 2030 05/28/20 2100 05/28/20 2130 BP: (!) 120/52 (!) 125/59 (!) 121/58 (!) 127/47   Pulse: 50 56 50 50   Resp:       Temp:       TempSrc:       SpO2: 99% 99% 98% 99%   Weight:       Height:           Patient was given the following medications:  Medications - No data to display    Patient is a 15-year-old male who presents to the ED with complaint of questionable altered mental status. Wife states apparently blood pressure at home is been running about 50 diastolic. Sent to the ED by PCP for further evaluation and treatment. Recently admitted with extensive work-up including PCI with 2 stents. Patient upon exam appears to be alert and oriented x3. Neurologically intact with no focal deficits. Had MRI which showed potential subacute strokes. Already on extensive therapy at home. Cardiology saw and performed PCI but CT surgery felt not candidate for surgical intervention. Urinalysis obtained and showed 14 red blood cells but no signs of infection. CBC showed normal white count, hemoglobin and platelets. CMP showed creatinine of 1.4 with BUN 31 relatively unremarkable from patient's previous. Troponin 0 0.02 and actually improved from previous and may potentially be downtrending secondary to PCI. Denies chest pain at this time. INR 1.88. . Chest x-ray Showed no acute abnormality. EKG interpreted by attending. CT of the head showed no acute abnormality. Given history and physical examination suffering from status post cardiac catheterization with complaints of potential abnormal blood pressure that is been completely unremarkable here in the emergency department. Reassuring work-up and did discuss case with hospitalist service who felt patient had extensive work-up recently and  on appropriate outpatient medication did not feel admission indicated at this time. Do not feel admission would provide much further management or treatment at this time.   Did discuss this with patient and wife who are agreeable for outpatient

## 2020-05-28 NOTE — TELEPHONE ENCOUNTER
[facility] just called was there for 1st visit since pt leaving hospital few days ago  and he seems out of it, blood pressure is and the 90 49, 100 50 and pts wife said he has been like this since leaving hospital. Please call nurse back at [phone] her name is  Hoa Aleman would like call back

## 2020-05-28 NOTE — TELEPHONE ENCOUNTER
Writer contacted Ligia LEES),  ED provider to inform of 30 day readmission risk. ED provider informed writer of readmission.

## 2020-05-29 NOTE — CARE COORDINATION
Yourself    Pt will be further monitored by COVID Loop Team based on severity of symptoms and risk factors.

## 2020-06-07 NOTE — TELEPHONE ENCOUNTER
caller stating pt  and needing permission to release body to  home. Reviewed medical hx. Permission granted and notified Zamzam Beckford.

## 2020-06-09 ENCOUNTER — TELEPHONE (OUTPATIENT)
Dept: CARDIAC CATH/INVASIVE PROCEDURES | Age: 65
End: 2020-06-09

## 2022-07-11 NOTE — PROGRESS NOTES
Addended by: KATRINA GARCIA on: 7/11/2022 11:16 AM     Modules accepted: Orders     Speech Language/Pathology   SPEECH LANGUAGE AND CLINICAL BEDSIDE SWALLOWING EVALUATION   Speech Therapy Department     Patient Name:  Rebeca Means  :  1955  Pain level: Pt did not report pain  Medical Diagnosis:  Ventricular tachycardia ([address] [I47.2]  Ventricular tachycardia ([address] [I47.2]     HPI: Per chart: This is a pleasant 75 y.o. male with history of chronic systolic heart failure (most recent echocardiogram from 2019 with ejection fraction 30 to 35%), history of CAD, essential hypertension, hyperlipidemia, history of CVA, obesity with BMI of 34.8 kg/m², status post AICD placement, who presents to the emergency room with complaints of palpitations and defibrillator discharge. Patient was mowing grass at home when he first experienced some palpitations which was followed by defibrillator discharge. He did pass out as he does not recollect most of what happened after EMS were called. En route, patient was started on amiodarone. He also required 3 short deliveries following repeat episodes of what appears to be ventricular tachycardia. Patient did not experience syncope.     On presentation to the emergency room, he has multiple lab abnormalities, including hypokalemia, anion gap metabolic acidosis. Glucose was notably elevated at 212. MRI of Brain:  1. Acute, punctate infarcts along the right superior cerebellar vermis. 2. Multifocal white matter lesions noted supra and infratentorially,   compatible with the patient's clinical history of demyelinating disease. There are approximately 4 enhancing lesions noted along the right frontal,   parietal, and occipital lobes, compatible with areas of active demyelination   versus enhancement in subacute infarcts. 3. Cerebral and cerebellar parenchymal volume loss with severe chronic   microvascular white matter ischemic disease.         Chest x-ray from 20:  Vascular congestion.     History: Pt has been seen by speech in the past, most

## 2022-07-21 NOTE — PROGRESS NOTES
Problem: At Risk for Falls  Goal: # Patient does not fall  Outcome: Outcome Met, Continue evaluating goal progress toward completion  Goal: # Takes action to control fall-related risks  Outcome: Outcome Met, Continue evaluating goal progress toward completion  Goal: # Verbalizes understanding of fall risk/precautions  Description: Document education using the patient education activity  Outcome: Outcome Met, Continue evaluating goal progress toward completion     Problem: At Risk for Injury Due to Fall  Goal: # Patient does not fall  Outcome: Outcome Met, Continue evaluating goal progress toward completion  Goal: # Takes action to control condition specific risks  Outcome: Outcome Met, Continue evaluating goal progress toward completion  Goal: # Verbalizes understanding of fall-related injury personal risks  Description: Document education using the patient education activity  Outcome: Outcome Met, Continue evaluating goal progress toward completion     Problem: Pressure Injury, Risk for  Goal: # Skin remains intact  Outcome: Outcome Met, Continue evaluating goal progress toward completion     Problem: Non-Pressure Injury Wound  Goal: # No deterioration in wound  Outcome: Outcome Met, Continue evaluating goal progress toward completion  Goal: # Verbalizes understanding of wound and wound care  Description: If abnormality is a skin tear, avoid using tape on skin including transparent dressings. Document education using the patient education activity.  Outcome: Outcome Met, Continue evaluating goal progress toward completion  Goal: Participates in wound care activities  Outcome: Outcome Met, Continue evaluating goal progress toward completion      Pt resting in bed quietly. Pt given jello and broth per request. Fluids infusing per order. Call light and needs in reach. Will monitor.  Electronically signed by Nasrin Callaway RN on 9/12/2019 at 10:29 PM

## (undated) DEVICE — CHLORAPREP 26ML ORANGE

## (undated) DEVICE — DEVICE LCK HLDR WIRE MICROVASIVE RAP EXCHG OLY FUJINON RX

## (undated) DEVICE — GOWN SIRUS NONREIN LG W/TWL: Brand: MEDLINE INDUSTRIES, INC.

## (undated) DEVICE — BITE BLK 60FR GRN ENDOSCP AD W STRP SLD DISPOSABLE

## (undated) DEVICE — RETRIEVAL BALLOON CATHETER: Brand: EXTRACTOR™ PRO RX

## (undated) DEVICE — INDICATED FOR USE DURING OPEN AND LAPAROSCOPIC CHOLECYSTECTOMY PROCEDURES TO INJECT RADIOPAQUE MEDIA THROUGH THE CYSTIC DUCT INTO THE BILIARY TREE.: Brand: AEROSTAT®

## (undated) DEVICE — HIGH PERFORMANCE GUIDEWIRE: Brand: JAGWIRE

## (undated) DEVICE — INSUFFLATION NEEDLE TO ESTABLISH PNEUMOPERITONEUM.: Brand: INSUFFLATION NEEDLE

## (undated) DEVICE — ORGANIZER MED DEV W76XL86CM PCH W25XL28CM FOR ENDOSCP TBL

## (undated) DEVICE — SUTURE VCRL SZ 4-0 L18IN ABSRB UD L19MM PS-2 3/8 CIR PRIM J496H

## (undated) DEVICE — SYRINGE MED 30ML STD CLR PLAS LUERLOCK TIP N CTRL DISP

## (undated) DEVICE — CORD ES L10FT MPLR LAP

## (undated) DEVICE — SNARE ENDOSCP 11 MM BRAIDED WIRE CAPTFLX DISP

## (undated) DEVICE — SPHINCTEROTOME: Brand: JAGTOME RX 39

## (undated) DEVICE — KIT OR ROOM TURNOVER W/STRAP

## (undated) DEVICE — TROCAR: Brand: KII SLEEVE

## (undated) DEVICE — SUTURE SZ 0 27IN 5/8 CIR UR-6  TAPER PT VIOLET ABSRB VICRYL J603H

## (undated) DEVICE — GARMENT COMPR STD FOR 17IN CALF UNIF THER FLOTRN

## (undated) DEVICE — APPLIER CLP M L L11.4IN DIA10MM ENDOSCP ROT MULT FOR LIG

## (undated) DEVICE — CANISTER, RIGID, 1200CC: Brand: MEDLINE INDUSTRIES, INC.

## (undated) DEVICE — Device

## (undated) DEVICE — ADTEC SINGLE USE HOOK SCISSORS, SHAFT ONLY, MONOPOLAR, STRAIGHT, WORKING LENGTH: 12 1/4", (310 MM), DIAM. 5 MM, BLUNT/BLUNT, INSULATED, SINGLE ACTION, STERILE, DISPOSABLE, PACKAGE OF 10 PIECES: Brand: AESCULAP

## (undated) DEVICE — DRAPE,LAP,CHOLE,W/TROUGHS,STERILE: Brand: MEDLINE

## (undated) DEVICE — SOLUTION IV IRRIG POUR BRL 0.9% SODIUM CHL 2F7124

## (undated) DEVICE — ELECTRODE PT RET AD L9FT HI MOIST COND ADH HYDRGEL CORDED

## (undated) DEVICE — SKIN AFFIX SURG ADHESIVE 72/CS 0.55ML: Brand: MEDLINE

## (undated) DEVICE — DRAIN SURG 19FR 100% SIL RADPQ RND CHN FULL FLUT

## (undated) DEVICE — COVER LT HNDL BLU PLAS

## (undated) DEVICE — SYRINGE 20ML LL S/C 50

## (undated) DEVICE — NEEDLE HYPO 25GA L1.5IN BVL ORIENTED ECLIPSE

## (undated) DEVICE — LAPAROSCOPY PK

## (undated) DEVICE — DRAPE C ARM UNIV W41XL74IN CLR PLAS XR VELC CLSR POLY STRP

## (undated) DEVICE — ENDOSCOPY KIT: Brand: MEDLINE INDUSTRIES, INC.

## (undated) DEVICE — TROCAR: Brand: KII SHIELDED BLADED ACCESS SYSTEM

## (undated) DEVICE — GOWN SIRUS NONREIN XL W/TWL: Brand: MEDLINE INDUSTRIES, INC.

## (undated) DEVICE — GLOVE ORANGE PI 7 1/2   MSG9075

## (undated) DEVICE — [HIGH FLOW INSUFFLATOR,  DO NOT USE IF PACKAGE IS DAMAGED,  KEEP DRY,  KEEP AWAY FROM SUNLIGHT,  PROTECT FROM HEAT AND RADIOACTIVE SOURCES.]: Brand: PNEUMOSURE

## (undated) DEVICE — GLOVE ORANGE PI 7   MSG9070

## (undated) DEVICE — RESERVOIR,SUCTION,100CC,SILICONE: Brand: MEDLINE

## (undated) DEVICE — PMI DISPOSABLE PUNCTURE CLOSURE DEVICE / SUTURE GRASPER: Brand: PMI

## (undated) DEVICE — TUBING, SUCTION, 1/4" X 12', STRAIGHT: Brand: MEDLINE